# Patient Record
Sex: FEMALE | Race: WHITE | Employment: OTHER | URBAN - METROPOLITAN AREA
[De-identification: names, ages, dates, MRNs, and addresses within clinical notes are randomized per-mention and may not be internally consistent; named-entity substitution may affect disease eponyms.]

---

## 2017-05-27 ENCOUNTER — APPOINTMENT (OUTPATIENT)
Dept: CT IMAGING | Facility: CLINIC | Age: 82
End: 2017-05-27
Attending: EMERGENCY MEDICINE
Payer: MEDICARE

## 2017-05-27 ENCOUNTER — HOSPITAL ENCOUNTER (EMERGENCY)
Facility: CLINIC | Age: 82
Discharge: HOME | End: 2017-05-27
Attending: EMERGENCY MEDICINE | Admitting: EMERGENCY MEDICINE
Payer: MEDICARE

## 2017-05-27 VITALS
TEMPERATURE: 98.4 F | HEIGHT: 63 IN | WEIGHT: 163 LBS | DIASTOLIC BLOOD PRESSURE: 62 MMHG | RESPIRATION RATE: 18 BRPM | SYSTOLIC BLOOD PRESSURE: 142 MMHG | BODY MASS INDEX: 28.88 KG/M2 | OXYGEN SATURATION: 92 %

## 2017-05-27 DIAGNOSIS — R11.11 NON-INTRACTABLE VOMITING WITHOUT NAUSEA, UNSPECIFIED VOMITING TYPE: ICD-10-CM

## 2017-05-27 LAB
ALBUMIN SERPL-MCNC: 3.6 G/DL (ref 3.4–5)
ALBUMIN UR-MCNC: 30 MG/DL
ALP SERPL-CCNC: 100 U/L (ref 40–150)
ALT SERPL W P-5'-P-CCNC: 19 U/L (ref 0–50)
ANION GAP SERPL CALCULATED.3IONS-SCNC: 12 MMOL/L (ref 3–14)
APPEARANCE UR: CLEAR
AST SERPL W P-5'-P-CCNC: 16 U/L (ref 0–45)
BACTERIA #/AREA URNS HPF: ABNORMAL /HPF
BASOPHILS # BLD AUTO: 0 10E9/L (ref 0–0.2)
BASOPHILS NFR BLD AUTO: 0.2 %
BILIRUB SERPL-MCNC: 0.6 MG/DL (ref 0.2–1.3)
BILIRUB UR QL STRIP: NEGATIVE
BUN SERPL-MCNC: 12 MG/DL (ref 7–30)
CALCIUM SERPL-MCNC: 9.2 MG/DL (ref 8.5–10.1)
CHLORIDE SERPL-SCNC: 95 MMOL/L (ref 94–109)
CO2 SERPL-SCNC: 25 MMOL/L (ref 20–32)
COLOR UR AUTO: YELLOW
CREAT SERPL-MCNC: 0.81 MG/DL (ref 0.52–1.04)
DIFFERENTIAL METHOD BLD: NORMAL
EOSINOPHIL # BLD AUTO: 0 10E9/L (ref 0–0.7)
EOSINOPHIL NFR BLD AUTO: 0.2 %
ERYTHROCYTE [DISTWIDTH] IN BLOOD BY AUTOMATED COUNT: 14 % (ref 10–15)
GFR SERPL CREATININE-BSD FRML MDRD: 68 ML/MIN/1.7M2
GLUCOSE SERPL-MCNC: 199 MG/DL (ref 70–99)
GLUCOSE UR STRIP-MCNC: >1000 MG/DL
HCT VFR BLD AUTO: 40.3 % (ref 35–47)
HGB BLD-MCNC: 13.7 G/DL (ref 11.7–15.7)
HGB UR QL STRIP: NEGATIVE
HYALINE CASTS #/AREA URNS LPF: 3 /LPF (ref 0–2)
IMM GRANULOCYTES # BLD: 0 10E9/L (ref 0–0.4)
IMM GRANULOCYTES NFR BLD: 0.5 %
KETONES UR STRIP-MCNC: 10 MG/DL
LEUKOCYTE ESTERASE UR QL STRIP: NEGATIVE
LYMPHOCYTES # BLD AUTO: 0.8 10E9/L (ref 0.8–5.3)
LYMPHOCYTES NFR BLD AUTO: 13.2 %
MCH RBC QN AUTO: 28.7 PG (ref 26.5–33)
MCHC RBC AUTO-ENTMCNC: 34 G/DL (ref 31.5–36.5)
MCV RBC AUTO: 85 FL (ref 78–100)
MONOCYTES # BLD AUTO: 1.1 10E9/L (ref 0–1.3)
MONOCYTES NFR BLD AUTO: 19.3 %
MUCOUS THREADS #/AREA URNS LPF: PRESENT /LPF
NEUTROPHILS # BLD AUTO: 3.8 10E9/L (ref 1.6–8.3)
NEUTROPHILS NFR BLD AUTO: 66.6 %
NITRATE UR QL: NEGATIVE
NRBC # BLD AUTO: 0 10*3/UL
NRBC BLD AUTO-RTO: 0 /100
PH UR STRIP: 7 PH (ref 5–7)
PLATELET # BLD AUTO: 285 10E9/L (ref 150–450)
POTASSIUM SERPL-SCNC: 3 MMOL/L (ref 3.4–5.3)
PROT SERPL-MCNC: 7.2 G/DL (ref 6.8–8.8)
RBC # BLD AUTO: 4.77 10E12/L (ref 3.8–5.2)
RBC #/AREA URNS AUTO: 1 /HPF (ref 0–2)
SODIUM SERPL-SCNC: 132 MMOL/L (ref 133–144)
SP GR UR STRIP: 1 (ref 1–1.03)
SQUAMOUS #/AREA URNS AUTO: 1 /HPF (ref 0–1)
URN SPEC COLLECT METH UR: ABNORMAL
UROBILINOGEN UR STRIP-MCNC: NORMAL MG/DL (ref 0–2)
WBC # BLD AUTO: 5.7 10E9/L (ref 4–11)
WBC #/AREA URNS AUTO: 1 /HPF (ref 0–2)

## 2017-05-27 PROCEDURE — 85025 COMPLETE CBC W/AUTO DIFF WBC: CPT | Performed by: EMERGENCY MEDICINE

## 2017-05-27 PROCEDURE — 25000128 H RX IP 250 OP 636: Performed by: EMERGENCY MEDICINE

## 2017-05-27 PROCEDURE — 96361 HYDRATE IV INFUSION ADD-ON: CPT

## 2017-05-27 PROCEDURE — 87088 URINE BACTERIA CULTURE: CPT | Performed by: EMERGENCY MEDICINE

## 2017-05-27 PROCEDURE — 81001 URINALYSIS AUTO W/SCOPE: CPT | Performed by: EMERGENCY MEDICINE

## 2017-05-27 PROCEDURE — 96365 THER/PROPH/DIAG IV INF INIT: CPT

## 2017-05-27 PROCEDURE — 25000125 ZZHC RX 250: Performed by: EMERGENCY MEDICINE

## 2017-05-27 PROCEDURE — 99285 EMERGENCY DEPT VISIT HI MDM: CPT | Mod: 25

## 2017-05-27 PROCEDURE — 74176 CT ABD & PELVIS W/O CONTRAST: CPT

## 2017-05-27 PROCEDURE — 80053 COMPREHEN METABOLIC PANEL: CPT | Performed by: EMERGENCY MEDICINE

## 2017-05-27 PROCEDURE — 87086 URINE CULTURE/COLONY COUNT: CPT | Performed by: EMERGENCY MEDICINE

## 2017-05-27 RX ORDER — POTASSIUM CL/LIDO/0.9 % NACL 10MEQ/0.1L
10 INTRAVENOUS SOLUTION, PIGGYBACK (ML) INTRAVENOUS
Status: DISCONTINUED | OUTPATIENT
Start: 2017-05-27 | End: 2017-05-27 | Stop reason: HOSPADM

## 2017-05-27 RX ORDER — SODIUM CHLORIDE 9 MG/ML
1000 INJECTION, SOLUTION INTRAVENOUS CONTINUOUS
Status: DISCONTINUED | OUTPATIENT
Start: 2017-05-27 | End: 2017-05-27 | Stop reason: HOSPADM

## 2017-05-27 RX ORDER — ONDANSETRON 4 MG/1
4 TABLET, ORALLY DISINTEGRATING ORAL EVERY 8 HOURS PRN
Qty: 10 TABLET | Refills: 0 | Status: SHIPPED | OUTPATIENT
Start: 2017-05-27 | End: 2017-05-30

## 2017-05-27 RX ADMIN — SODIUM CHLORIDE 1000 ML: 9 INJECTION, SOLUTION INTRAVENOUS at 10:34

## 2017-05-27 RX ADMIN — POTASSIUM CHLORIDE 10 MEQ: 14.9 INJECTION, SOLUTION, CONCENTRATE PARENTERAL at 11:51

## 2017-05-27 RX ADMIN — SODIUM CHLORIDE 1000 ML: 9 INJECTION, SOLUTION INTRAVENOUS at 09:42

## 2017-05-27 ASSESSMENT — ENCOUNTER SYMPTOMS: VOMITING: 1

## 2017-05-27 NOTE — ED AVS SNAPSHOT
Emergency Department    [address] [facility] [address]    Phone:  [phone]    Fax:  [phone]                                       Aisha Fitzgerald   MRN: [id number]    Department:   Emergency Department   Date of Visit:  5/27/2017           After Visit Summary Signature Page     I have received my discharge instructions, and my questions have been answered. I have discussed any challenges I see with this plan with the nurse or doctor.    ..........................................................................................................................................  Patient/Patient Representative Signature      ..........................................................................................................................................  Patient Representative Print Name and Relationship to Patient    ..................................................               ................................................  Date                                            Time    ..........................................................................................................................................  Reviewed by Signature/Title    ...................................................              ..............................................  Date                                                            Time

## 2017-05-27 NOTE — ED PROVIDER NOTES
History     Chief Complaint:  Vomiting    HPI   Aisha Fitzgerald is a 81 year old female with history of ulcer and bowel obstruction who presents with vomiting. The patient had an onset of vomiting yesterday and notes vomiting about 20 times with dark brown emesis. The patient reports difficulty tolerating fluids but notes that she was able to keep down water this morning. The patient desires rehydration. The patient reports having her last bowel movement yesterday morning that was normal.    Allergies:  Hydrochlorothiazide  Contrast dye     Medications:    azithromycin (ZITHROMAX Z-JOVI) 250 MG tablet  Atorvastatin Calcium (LIPITOR PO)  calcium carb 1250 mg, 500 mg [place],/vitamin D 200 units (OSCAL WITH D) 500-200 MG-UNIT per tablet  insulin glargine (LANTUS) 100 UNIT/ML PEN  METOPROLOL SUCCINATE ER PO  METFORMIN HCL PO  fluticasone-salmeterol (ADVAIR) 250-50 MCG/DOSE diskus inhaler  tiotropium (SPIRIVA) 18 MCG inhalation capsule  OMEPRAZOLE PO  Capsicum, Cayenne, (CAYENNE PO)  Cholecalciferol (VITAMIN D3 PO)  CLONIDINE HCL PO  LOSARTAN POTASSIUM PO  CITALOPRAM HYDROBROMIDE PO  Amlodipine Besylate (NORVASC PO)  Levothyroxine Sodium (LEVOTHROID PO)  albuterol (PROVENTIL HFA: VENTOLIN HFA) 108 (90 BASE) MCG/ACT inhaler  COENZYME Q-10 PO  ASPIRIN PO     Past Medical History:    Gastroenteritis  ATN  Respiratory failure  Pneumonia-strep pneumonia  Septic shock  COPD  Bronchiolitis  Diabetes  Hypertension  Ulcer    Past Surgical History:    History reviewed. No pertinent past surgical history.    Family History:    History reviewed. No pertinent family history.    Social History:  Marital Status: Single  Presents to the ED alone  Tobacco Use: 0.50 packs daily  Alcohol Use: No  PCP: Janey Anderson     Review of Systems   Gastrointestinal: Positive for vomiting.   10 point review of systems performed and is negative except as above and in HPI.    Physical Exam   First Vitals:  BP: 147/69  Heart Rate: 103  Temp: 98.4  F  "(36.9  C)  Resp: 18  Height: 160 cm (5' 3\")  Weight: 73.9 kg (163 lb)  SpO2: 94 %    Physical Exam  General: Resting on the gurney, appears comfortable  Head:  The scalp, face, and head appear normal  Mouth/Throat: Mucus membranes are slightly dry  CV:  Regular rate    Normal S1 and S2  No pathological murmur   Resp:  Breath sounds clear and equal bilaterally    Non-labored, no retractions or accessory muscle use    No coarseness    No wheezing   GI:  Abdomen is soft, no guarding, no rebound, no rigidity    No tenderness to palpation  MS:  Normal motor assessment of all extremities.    Good capillary refill noted.  Neuro:   Speech is normal and fluent. No apparent deficit.  Psych: Awake. Alert.  Normal affect.      Appropriate interactions.    Emergency Department Course   Imaging:  Radiographic findings were communicated with the patient who voiced understanding of the findings.    CT Abdomen Pelvis w/o Contrast   Final Result   Impression:    1. Cholelithiasis without signs of cholecystitis or biliary   dilatation.   2. Sequela of prior granulomatous disease in the liver and spleen.   3. Extensive atherosclerotic calcifications in the abdominal aorta and   its branch vessels without aneurysmal dilatation.      MANOLO CURRY        All Readings Per Radiology Unless Otherwise Noted.    Laboratory:  CBC: WBC 5.7, HGB 13.7, , WNL  CMP:  (L), K 3.0 (L), Glc 199 (H), Rest WNL (Creatinine 0.81)  UA: Clear yellow urine, Glc >1,000, Ketones 10, Albumin 30, Bacteria Few, Mucous Present, Hyaline Casts 3 (H), otherwise WNL  Urine Culture: pending    Interventions:  (0942) Normal Saline, 1 liter, IV bolus  (1151) K Cl 10 mEq with 10 mg lidocaine, IV infusion    ED Course:  Nursing notes and past medical history reviewed.   I performed a physical examination of the patient as documented above.  I explained the plan with the patient who consents to this.   The patient was sent for a CT abdomen pelvis while in the " emergency department, findings above.  Blood was drawn from the patient. This was sent for laboratory testing, findings above.  Urine sample was obtained and sent for laboratory analysis, findings above.  1359: Rechecked the patient.  Findings and plan explained to the patient. Patient discharged home with instructions regarding supportive care, medications, and reasons to return. The importance of close follow-up was reviewed.    Impression & Plan    Medical Decision Making:  Aisha Fitzgerald is a 81 year old female who presents to the emergency department for the evaluation of vomiting.  Lab evaluation shows without significant abnormality except for hyperglycemia and glucose in her urine .  The patient was given fluids and zofran with improvement in symptoms.  The abdominal exam is benign and hepatobiliary disease, obstruction, ischemia, of surgical etiology is highly unlikely.      Lab evaluation shoes no evidence of profound dehydration.  Potassium replaced.    Considerable symptomatic improvement and the patient is comfortable with close out patient follow up and strict return precautions.  Prescription for zofran given.        Diagnosis:    ICD-10-CM    1. Non-intractable vomiting without nausea, unspecified vomiting type R11.11        Disposition:   Discharge to home.     Discharge Medications:   Discharge Medication List as of 5/27/2017  1:56 PM      START taking these medications    Details   ondansetron (ZOFRAN ODT) 4 MG ODT tab Take 1 tablet (4 mg) by mouth every 8 hours as needed for nausea, Disp-10 tablet, R-0, Local Print               Marcio COBURN, am serving as a scribe on 5/27/2017 at 12:20 PM to personally document services performed by Teena Hatfield MD, based on my observations and the provider's statements to me.       Teena Hatfield MD  05/28/17 2007

## 2017-05-27 NOTE — ED AVS SNAPSHOT
Emergency Department    [address] [facility] [address]    Phone:  [phone]    Fax:  [phone]                                       Aisha Fitzgerald   MRN: [id number]    Department:   Emergency Department   Date of Visit:  5/27/2017           Patient Information     Date Of Birth          1935        Your diagnoses for this visit were:     Non-intractable vomiting without nausea, unspecified vomiting type        You were seen by Teena Hatfield MD.      Follow-up Information     Follow up with Janey Anderson Schedule an appointment as soon as possible for a visit in 2 days.    Specialty:  Physician Assistant    Contact information:    [facility]  [address]  [facility] [address] [phone]          Follow up with  Emergency Department.    Specialty:  EMERGENCY MEDICINE    Why:  As needed, If symptoms worsen    Contact information:    [address] [facility] [address] [phone]        Discharge Instructions         Discharge Instructions  Vomiting    You have been seen today for vomiting. This is usually caused by a virus, but some bacteria, parasites, medicines or other medical conditions can cause similar symptoms. At this time your doctor does not find that your vomiting is a sign of anything dangerous or life-threatening. However, sometimes the signs of serious illness do not show up right away. If you have new or worse symptoms, you may need to be seen again in the emergency department or by your primary doctor. Remember that serious problems like appendicitis can start as vomiting.     Return to the Emergency Department if:    You keep throwing up and you are not able to keep liquids down.     You feel you are getting dehydrated, such as being very thirsty, not urinating at least every 8-12 hours, or feeling faint or lightheaded.     You develop a new fever, or your fever continues for more than 2 days.     You have  belly pain that seems worse than cramps, is in one spot, or is getting worse over time.     You have blood in your vomit or stools.     You feel very weak.    You are not starting to improve within 24 hours of your visit here.     What can I do to help myself?    The most important thing to do is to drink clear liquids. If you have been vomiting a lot, it is best to have only small, frequent sips of liquids. Drinking too much at once may cause more vomiting. If you are vomiting often, you must replace minerals, sodium and potassium lost with your illness. Pedialyte  and sports drinks can help you replace these minerals. You can also drink clear liquids such as water, weak tea, apple juice, and 7-Up . Avoid acid liquids (orange), caffeine (coffee) or alcohol. Do not drink milk until you no longer have diarrhea.     After liquids are staying down, you may start eating mild foods. Soda crackers, toast, plain noodles, gelatin, applesauce and bananas are good first choices. Avoid foods that have acid, are spicy, fatty or have a lot of fiber (such as meats, coarse grains, vegetables). You may start eating these foods again in about 3 days when you are better.     Sometimes treatment includes prescription medicine to prevent nausea and vomiting. If your doctor prescribes these for you, take them as directed.     Don t take ibuprofen, or other nonsteroidal anti-inflammatory medicines without checking with your healthcare provider.   If you were given a prescription for medicine here today, be sure to read all of the information (including the package insert) that comes with your prescription.  This will include important information about the medicine, its side effects, and any warnings that you need to know about.  The pharmacist who fills the prescription can provide more information and answer questions you may have about the medicine.  If you have questions or concerns that the pharmacist cannot address, please call or  return to the Emergency Department.       Opioid Medication Information    Pain medications are among the most commonly prescribed medicines, so we are including this information for all our patients. If you did not receive pain medication or get a prescription for pain medicine, you can ignore it.     You may have been given a prescription for an opioid (narcotic) pain medicine and/or have received a pain medicine while here in the Emergency Department. These medicines can make you drowsy or impaired. You must not drive, operate dangerous equipment, or engage in any other dangerous activities while taking these medications. If you drive while taking these medications, you could be arrested for DUI, or driving under the influence. Do not drink any alcohol while you are taking these medications.     Opioid pain medications can cause addiction. If you have a history of chemical dependency of any type, you are at a higher risk of becoming addicted to pain medications.  Only take these prescribed medications to treat your pain when all other options have been tried. Take it for as short a time and as few doses as possible. Store your pain pills in a secure place, as they are frequently stolen and provide a dangerous opportunity for children or visitors in your house to start abusing these powerful medications. We will not replace any lost or stolen medicine.  As soon as your pain is better, you should flush all your remaining medication.     Many prescription pain medications contain Tylenol  (acetaminophen), including Vicodin , Tylenol #3 , Norco , Lortab , and Percocet .  You should not take any extra pills of Tylenol  if you are using these prescription medications or you can get very sick.  Do not ever take more than 3000 mg of acetaminophen in any 24 hour period.    All opioids tend to cause constipation. Drink plenty of water and eat foods that have a lot of fiber, such as fruits, vegetables, prune juice, apple  juice and high fiber cereal.  Take a laxative if you don t move your bowels at least every other day. Miralax , Milk of Magnesia, Colace , or Senna  can be used to keep you regular.      Remember that you can always come back to the Emergency Department if you are not able to see your regular doctor in the amount of time listed above, if you get any new symptoms, or if there is anything that worries you.          24 Hour Appointment Hotline       To make an appointment at any [facility], call [phone] ([phone]). If you don't have a family doctor or clinic, we will help you find one. [place] clinics are conveniently located to serve the needs of you and your family.             Review of your medicines      START taking        Dose / Directions Last dose taken    ondansetron 4 MG ODT tab   Commonly known as:  ZOFRAN ODT   Dose:  4 mg   Quantity:  10 tablet        Take 1 tablet (4 mg) by mouth every 8 hours as needed for nausea   Refills:  0          Our records show that you are taking the medicines listed below. If these are incorrect, please call your family doctor or clinic.        Dose / Directions Last dose taken    albuterol 108 (90 BASE) MCG/ACT Inhaler   Commonly known as:  PROAIR HFA/PROVENTIL HFA/VENTOLIN HFA   Dose:  2-4 puff        Inhale 2-4 puffs into the lungs every 4 hours as needed   Refills:  0        ASPIRIN PO   Dose:  81 mg        Take 81 mg by mouth daily.   Refills:  0        azithromycin 250 MG tablet   Commonly known as:  ZITHROMAX Z-JOVI   Quantity:  6 tablet        Take as directed   Refills:  0        calcium carb 1250 mg (500 mg [place])/vitamin D 200 units 500-200 MG-UNIT per tablet   Commonly known as:  OSCAL with D   Dose:  0.5 tablet        Take 0.5 tablets by mouth daily   Refills:  0        CAYENNE PO   Dose:  1 tablet        Take 1 tablet by mouth daily Unknown dose   Refills:  0        CITALOPRAM HYDROBROMIDE PO   Dose:  20 mg        Take 20 mg by mouth daily.    Refills:  0        CLONIDINE HCL PO   Dose:  0.1 mg        Take 0.1 mg by mouth 2 times daily.   Refills:  0        COENZYME Q-10 PO   Dose:  100 mg        Take 100 mg by mouth daily.   Refills:  0        fluticasone-salmeterol 250-50 MCG/DOSE diskus inhaler   Commonly known as:  ADVAIR   Dose:  1 puff        Inhale 1 puff into the lungs 2 times daily   Refills:  0        insulin glargine 100 UNIT/ML injection   Commonly known as:  LANTUS   Dose:  8 Units        Inject 8 Units Subcutaneous At Bedtime   Refills:  0        LEVOTHROID PO   Dose:  112 mcg        Take 112 mcg by mouth daily   Refills:  0        LIPITOR PO   Dose:  20 mg        Take 20 mg by mouth every evening   Refills:  0        LOSARTAN POTASSIUM PO   Dose:  25 mg        Take 25 mg by mouth 2 times daily   Refills:  0        METFORMIN HCL PO   Dose:  500 mg        Take 500 mg by mouth daily (with breakfast)   Refills:  0        METOPROLOL SUCCINATE ER PO   Dose:  25 mg        Take 25 mg by mouth every evening   Refills:  0        NORVASC PO   Dose:  5 mg        Take 5 mg by mouth daily   Refills:  0        OMEPRAZOLE PO   Dose:  40 mg        Take 40 mg by mouth every morning   Refills:  0        tiotropium 18 MCG capsule   Commonly known as:  SPIRIVA   Dose:  18 mcg        Inhale 18 mcg into the lungs daily   Refills:  0        VITAMIN D3 PO   Dose:  1000 Units        Take 1,000 Units by mouth daily.   Refills:  0                Prescriptions were sent or printed at these locations (1 Prescription)                   Other Prescriptions                Printed at Department/Unit printer (1 of 1)         ondansetron (ZOFRAN ODT) 4 MG ODT tab                Procedures and tests performed during your visit     CBC with platelets differential    CT Abdomen Pelvis w/o Contrast    Comprehensive metabolic panel    UA with Microscopic    Urine Culture      Orders Needing Specimen Collection     None      Pending Results     Date and Time Order Name Status  Description    5/27/2017 0941 Urine Culture In process             Pending Culture Results     Date and Time Order Name Status Description    5/27/2017 0941 Urine Culture In process             Pending Results Instructions     If you had any lab results that were not finalized at the time of your Discharge, you can call the ED Lab Result RN at [phone]. You will be contacted by this team for any positive Lab results or changes in treatment. The nurses are available 7 days a week from 10A to 6:30P.  You can leave a message 24 hours per day and they will return your call.        Test Results From Your Hospital Stay        5/27/2017 10:00 AM      Component Results     Component Value Ref Range & Units Status    WBC 5.7 4.0 - 11.0 10e9/L Final    RBC Count 4.77 3.8 - 5.2 10e12/L Final    Hemoglobin 13.7 11.7 - 15.7 g/dL Final    Hematocrit 40.3 35.0 - 47.0 % Final    MCV 85 78 - 100 fl Final    MCH 28.7 26.5 - 33.0 pg Final    MCHC 34.0 31.5 - 36.5 g/dL Final    RDW 14.0 10.0 - 15.0 % Final    Platelet Count 285 150 - 450 10e9/L Final    Diff Method Automated Method  Final    % Neutrophils 66.6 % Final    % Lymphocytes 13.2 % Final    % Monocytes 19.3 % Final    % Eosinophils 0.2 % Final    % Basophils 0.2 % Final    % Immature Granulocytes 0.5 % Final    Nucleated RBCs 0 0 /100 Final    Absolute Neutrophil 3.8 1.6 - 8.3 10e9/L Final    Absolute Lymphocytes 0.8 0.8 - 5.3 10e9/L Final    Absolute Monocytes 1.1 0.0 - 1.3 10e9/L Final    Absolute Eosinophils 0.0 0.0 - 0.7 10e9/L Final    Absolute Basophils 0.0 0.0 - 0.2 10e9/L Final    Abs Immature Granulocytes 0.0 0 - 0.4 10e9/L Final    Absolute Nucleated RBC 0.0  Final         5/27/2017 10:20 AM      Component Results     Component Value Ref Range & Units Status    Sodium 132 (L) 133 - 144 mmol/L Final    Potassium 3.0 (L) 3.4 - 5.3 mmol/L Final    Chloride 95 94 - 109 mmol/L Final    Carbon Dioxide 25 20 - 32 mmol/L Final    Anion Gap 12 3 - 14 mmol/L Final     Glucose 199 (H) 70 - 99 mg/dL Final    Urea Nitrogen 12 7 - 30 mg/dL Final    Creatinine 0.81 0.52 - 1.04 mg/dL Final    GFR Estimate 68 >60 mL/min/1.7m2 Final    Non  GFR Calc    GFR Estimate If Black 82 >60 mL/min/1.7m2 Final    African American GFR Calc    Calcium 9.2 8.5 - 10.1 mg/dL Final    Bilirubin Total 0.6 0.2 - 1.3 mg/dL Final    Albumin 3.6 3.4 - 5.0 g/dL Final    Protein Total 7.2 6.8 - 8.8 g/dL Final    Alkaline Phosphatase 100 40 - 150 U/L Final    ALT 19 0 - 50 U/L Final    AST 16 0 - 45 U/L Final         5/27/2017 11:14 AM      Component Results     Component Value Ref Range & Units Status    Color Urine Yellow  Final    Appearance Urine Clear  Final    Glucose Urine >1000 (A) NEG mg/dL Final    Bilirubin Urine Negative NEG Final    Ketones Urine 10 (A) NEG mg/dL Final    Specific Gravity Urine 1.005 1.003 - 1.035 Final    Blood Urine Negative NEG Final    pH Urine 7.0 5.0 - 7.0 pH Final    Protein Albumin Urine 30 (A) NEG mg/dL Final    Urobilinogen mg/dL Normal 0.0 - 2.0 mg/dL Final    Nitrite Urine Negative NEG Final    Leukocyte Esterase Urine Negative NEG Final    Source Midstream Urine  Final    WBC Urine 1 0 - 2 /HPF Final    RBC Urine 1 0 - 2 /HPF Final    Bacteria Urine Few (A) NEG /HPF Final    Squamous Epithelial /HPF Urine 1 0 - 1 /HPF Final    Mucous Urine Present (A) NEG /LPF Final    Hyaline Casts 3 (H) 0 - 2 /LPF Final         5/27/2017 11:06 AM         5/27/2017 11:44 AM      Narrative     Exam: CT ABDOMEN PELVIS W/O CONTRAST  5/27/2017 11:37 AM    History: Vomiting.    Comparison: 7/1/2015    Technique: Volumetric acquisition with reconstruction in the axial,  coronal planes through the abdomen and pelvis without contrast.  Radiation dose for this scan was reduced using automated exposure  control, adjustment of the mA and/or kV according to patient size, or  iterative reconstruction technique.    Contrast: None    Findings:   Lung Bases: Scarring/atelectasis at the  right base is again seen. Lung  bases are otherwise clear. Small hiatal hernia.    Abdomen: Multiple calcified granulomas in the liver and spleen.  Unenhanced liver and spleen otherwise normal. Cholelithiasis without  signs of cholecystitis or biliary dilatation. Normal appearance of the  unenhanced adrenal glands and kidneys. Pancreas is atrophic.    No areas of bowel wall thickening or bowel dilatation. Normal  appendix. No free fluid. No abdominal or pelvic lymphadenopathy.  Atherosclerotic calcifications of the abdominal aorta and its branch  vessels without aneurysmal dilatation.    Bones: No concerning lytic or sclerotic lesions.        Impression     Impression:   1. Cholelithiasis without signs of cholecystitis or biliary  dilatation.  2. Sequela of prior granulomatous disease in the liver and spleen.  3. Extensive atherosclerotic calcifications in the abdominal aorta and  its branch vessels without aneurysmal dilatation.    MANOLO CURRY                Clinical Quality Measure: Blood Pressure Screening     Your blood pressure was checked while you were in the emergency department today. The last reading we obtained was  BP: 145/68 . Please read the guidelines below about what these numbers mean and what you should do about them.  If your systolic blood pressure (the top number) is less than 120 and your diastolic blood pressure (the bottom number) is less than 80, then your blood pressure is normal. There is nothing more that you need to do about it.  If your systolic blood pressure (the top number) is 120-139 or your diastolic blood pressure (the bottom number) is 80-89, your blood pressure may be higher than it should be. You should have your blood pressure rechecked within a year by a primary care provider.  If your systolic blood pressure (the top number) is 140 or greater or your diastolic blood pressure (the bottom number) is 90 or greater, you may have high blood pressure. High blood pressure is  "treatable, but if left untreated over time it can put you at risk for heart attack, stroke, or kidney failure. You should have your blood pressure rechecked by a primary care provider within the next 4 weeks.  If your provider in the emergency department today gave you specific instructions to follow-up with your doctor or provider even sooner than that, you should follow that instruction and not wait for up to 4 weeks for your follow-up visit.        Thank you for choosing [facility]       Thank you for choosing [facility] for your care. Our goal is always to provide you with excellent care. Hearing back from our patients is one way we can continue to improve our services. Please take a few minutes to complete the written survey that you may receive in the mail after you visit with us. Thank you!        [facility] Information     [facility] lets you send messages to your doctor, view your test results, renew your prescriptions, schedule appointments and more. To sign up, go to [email or web address] . Click on \"Log in\" on the left side of the screen, which will take you to the Welcome page. Then click on \"Sign up Now\" on the right side of the page.     You will be asked to enter the access code listed below, as well as some personal information. Please follow the directions to create your username and password.     Your access code is: [email or web address]  Expires: 2017  1:56 PM     Your access code will  in 90 days. If you need help or a new code, please call your [facility] clinic or [phone].        Care EveryWhere ID     This is your Care EveryWhere ID. This could be used by other organizations to access your [facility] medical records  [id number]        After Visit Summary       This is your record. Keep this with you and show to your community pharmacist(s) and doctor(s) at your next visit.                  "

## 2017-05-28 ENCOUNTER — TELEPHONE (OUTPATIENT)
Dept: EMERGENCY MEDICINE | Facility: CLINIC | Age: 82
End: 2017-05-28

## 2017-05-28 DIAGNOSIS — N39.0 URINARY TRACT INFECTION: ICD-10-CM

## 2017-05-28 DIAGNOSIS — N39.0 UTI (URINARY TRACT INFECTION): ICD-10-CM

## 2017-05-28 NOTE — TELEPHONE ENCOUNTER
[facility] Emergency Department Lab result notification [Adult-Female]    [facility] ED lab result protocol used  Urine Culture    Reason for call  Notify of lab results, assess symptoms,  review ED providers recommendations/discharge instructions (if necessary) and advise per ED lab result f/u protocol    Lab Result (including Rx patient on, if applicable)  Preliminary urine culture report on 5/28/17 shows the presence of bacteria(s):  >100,000 colonies/mL Beta hemolytic Streptococcus group B   [place] ED discharge antibiotic: None.  Recommendations per [place] ED Lab result protocol - Urine culture protocol.    Information table from ED Provider visit on 5/27/17   ED diagnosis Non-intractable vomiting without nausea   ED provider Teena Hatfield MD   Symptoms reported at ED visit (Chief complaint, HPI) Aisha Fitzgerald is a 81 year old female with history of ulcer and bowel obstruction who presents with vomiting. The patient had an onset of vomiting yesterday and notes vomiting about 20 times with dark brown emesis. The patient reports difficulty tolerating fluids but notes that she was able to keep down water this morning. The patient desires rehydration. The patient reports having her last bowel movement yesterday morning that was normal.      ED providers Impression and Plan (applicable information) Aisha Fitzgerald is a 81 year old female who presents to the emergency department for the evaluation of vomiting.  Lab evaluation shows without significant abnormality except for    .  The patient was given fluids and     with      improvement in symptoms.  The abdominal exam is benign and hepatobiliary disease, obstruction, ischemia, of surgical etiology is highly unlikely.             Lab evaluation shoes no evidence of profound dehydration or electrolyte abnormality.     Considerable symptomatic improvement and the patient is comfortable with close out patient follow up and strict return  "precautions.  Prescription for      given.  OR  Unfortunately, the patient's symptoms persisted despite symptomatic treatment attempts in the ED.  They are uncomfortable with discharge to home and are unable to tolerate PO.  Will admit to     observation for symptomatic control.   Significant Medical hx, if applicable Gastroenteritis, HTN, DM, COPD, PNA, resp failure   Coumadin/Warfarin [Yes /No] No   Creatinine Level (mg/dl) 0.81   Creatinine clearance (ml/min), if applicable 64.2   Pregnant (Yes/No/NA) No   Breastfeeding (Yes/No/NA) No   Allergies HCTZ, contrast dye   Weight, if applicable 163#      RN Assessment (Patient s current Symptoms), include time called.  [Insert Left message here if message left]  Aisha reports \"I feel fine\", able to sleep \"much better\", \"I slep all night\".  Denies further nausea/vomiting and denies urinary symptoms.   No new symptoms since discharge.       Please Contact your PCP clinic or return to the Emergency department if your:    Symptoms return.    Symptoms worsen or other concerning symptom's.    PCP follow-up Questions asked: NO    Elisa Cadena RN    [facility] RN  Lung Nodule and ED Lab Results F/U RN  Epic pool (ED late result f/u RN) : [id number]   # [phone]  Copy of Lab result    Order   Urine Culture [[id number]] (Order [id number])   Preliminary Result   Exam Information   Exam Date Exam Time Accession # Results    5/27/17 10:56 AM [id number]    Component Results   Component Collected Lab   Specimen Description 05/27/2017 10:56 AM [id number]   Midstream Urine   Special Requests 05/27/2017 10:56 AM 75   Specimen received in preservative   Culture Micro (Abnormal) 05/27/2017 10:56    >100,000 colonies/mL Beta hemolytic Streptococcus group B Beta Hemolytic    Streptococcus groups A and B are susceptible to ampicillin, penicillin,    vancomycin, and the cephalosporins.  Susceptibility testing is not routinely    done on these organisms isolated from " urine.      Micro Report Status 05/27/2017 10:56    Pending

## 2017-05-29 LAB
BACTERIA SPEC CULT: ABNORMAL
Lab: ABNORMAL
MICRO REPORT STATUS: ABNORMAL
SPECIMEN SOURCE: ABNORMAL

## 2017-05-29 NOTE — TELEPHONE ENCOUNTER
[facility] Emergency Department Lab result notification:    [facility] lab result protocol used  Urine Culture Protcol    Reason for call  Notify of lab results, assess symptoms,  review ED providers recommendations/discharge instructions (if necessary) and advise per ED lab result f/u protocol    Lab Result  Final urine culture on 05/29/2017 shows the presence of bacteria(s): >100,000 colonies/mL Beta hemolytic Streptococcus group B   [facility] ED discharge antibiotic: None  As per  ED lab result protocol, treat per Urine culture protocol.    RN Assessment (Patient s current Symptoms), include time called.  [Insert Left message here if message left]  At 1520 no message left got busy signal will attempt at a later time.     China Russell, RN  [facility] RN  Lung Nodule and ED Lab Result F/u RN  Epic pool (ED late result f/u RN): P [id number]   INCIDENTAL RADIOLOGY F/U NURSES: P [id number]  # [phone]      Copy of Lab result   Order   Urine Culture [[id number]] (Order [id number])   Exam Information   Exam Date Exam Time Accession # Results    5/27/17 10:56 AM [id number]    Component Results   Component Collected Lab   Specimen Description 05/27/2017 10:56 AM [id number]   Midstream Urine   Special Requests 05/27/2017 10:56 AM 75   Specimen received in preservative   Culture Micro (Abnormal) 05/27/2017 10:56    >100,000 colonies/mL Beta hemolytic Streptococcus group B Beta Hemolytic    Streptococcus groups A and B are susceptible to ampicillin, penicillin,    vancomycin, and the cephalosporins.  Susceptibility testing is not routinely    done on these organisms isolated from urine.      Micro Report Status 05/27/2017 10:56    FINAL 05/29/2017

## 2017-05-30 NOTE — TELEPHONE ENCOUNTER
Aisha notified of urine culture result.  She denies any specific uti sx's but is very fatigued.  Will treat with Augmentin 875 bid for 3 days.  She was encouraged to f/u her PCP if sx's are not resolving.    Epi Arreaga, RN  [facility] RN  Lung Nodule and ED Lab Result F/u RN  Epic pool (ED late result f/u RN): P [id number]  [facility] INCIDENTAL RADIOLOGY F/U NURSES: P [id number]  # [phone]

## 2017-11-02 ENCOUNTER — APPOINTMENT (OUTPATIENT)
Dept: GENERAL RADIOLOGY | Facility: CLINIC | Age: 82
End: 2017-11-02
Attending: EMERGENCY MEDICINE
Payer: MEDICARE

## 2017-11-02 ENCOUNTER — HOSPITAL ENCOUNTER (EMERGENCY)
Facility: CLINIC | Age: 82
Discharge: HOME | End: 2017-11-02
Attending: EMERGENCY MEDICINE | Admitting: EMERGENCY MEDICINE
Payer: MEDICARE

## 2017-11-02 VITALS
RESPIRATION RATE: 18 BRPM | HEIGHT: 63 IN | OXYGEN SATURATION: 94 % | BODY MASS INDEX: 27.11 KG/M2 | TEMPERATURE: 98 F | WEIGHT: 153 LBS | SYSTOLIC BLOOD PRESSURE: 151 MMHG | DIASTOLIC BLOOD PRESSURE: 82 MMHG

## 2017-11-02 DIAGNOSIS — R11.2 NON-INTRACTABLE VOMITING WITH NAUSEA, UNSPECIFIED VOMITING TYPE: ICD-10-CM

## 2017-11-02 DIAGNOSIS — E87.6 HYPOKALEMIA: ICD-10-CM

## 2017-11-02 LAB
ALBUMIN SERPL-MCNC: 4.2 G/DL (ref 3.4–5)
ALBUMIN UR-MCNC: 30 MG/DL
ALP SERPL-CCNC: 117 U/L (ref 40–150)
ALT SERPL W P-5'-P-CCNC: 21 U/L (ref 0–50)
ANION GAP SERPL CALCULATED.3IONS-SCNC: 13 MMOL/L (ref 3–14)
APPEARANCE UR: CLEAR
AST SERPL W P-5'-P-CCNC: 16 U/L (ref 0–45)
BASOPHILS # BLD AUTO: 0 10E9/L (ref 0–0.2)
BASOPHILS NFR BLD AUTO: 0.1 %
BILIRUB DIRECT SERPL-MCNC: 0.1 MG/DL (ref 0–0.2)
BILIRUB SERPL-MCNC: 0.7 MG/DL (ref 0.2–1.3)
BILIRUB UR QL STRIP: NEGATIVE
BUN SERPL-MCNC: 17 MG/DL (ref 7–30)
CALCIUM SERPL-MCNC: 10.4 MG/DL (ref 8.5–10.1)
CHLORIDE SERPL-SCNC: 94 MMOL/L (ref 94–109)
CO2 SERPL-SCNC: 25 MMOL/L (ref 20–32)
COLOR UR AUTO: ABNORMAL
CREAT SERPL-MCNC: 0.8 MG/DL (ref 0.52–1.04)
DIFFERENTIAL METHOD BLD: ABNORMAL
EOSINOPHIL # BLD AUTO: 0 10E9/L (ref 0–0.7)
EOSINOPHIL NFR BLD AUTO: 0 %
ERYTHROCYTE [DISTWIDTH] IN BLOOD BY AUTOMATED COUNT: 14.2 % (ref 10–15)
GFR SERPL CREATININE-BSD FRML MDRD: 69 ML/MIN/1.7M2
GLUCOSE SERPL-MCNC: 206 MG/DL (ref 70–99)
GLUCOSE UR STRIP-MCNC: 300 MG/DL
HCT VFR BLD AUTO: 41.9 % (ref 35–47)
HGB BLD-MCNC: 14.2 G/DL (ref 11.7–15.7)
HGB UR QL STRIP: ABNORMAL
HYALINE CASTS #/AREA URNS LPF: 1 /LPF (ref 0–2)
IMM GRANULOCYTES # BLD: 0.1 10E9/L (ref 0–0.4)
IMM GRANULOCYTES NFR BLD: 0.5 %
INTERPRETATION ECG - MUSE: NORMAL
KETONES UR STRIP-MCNC: 10 MG/DL
LACTATE BLD-SCNC: 2.4 MMOL/L (ref 0.7–2)
LACTATE SERPL-SCNC: 0.9 MMOL/L (ref 0.4–2)
LEUKOCYTE ESTERASE UR QL STRIP: NEGATIVE
LIPASE SERPL-CCNC: 106 U/L (ref 73–393)
LYMPHOCYTES # BLD AUTO: 0.8 10E9/L (ref 0.8–5.3)
LYMPHOCYTES NFR BLD AUTO: 5.9 %
MCH RBC QN AUTO: 26.5 PG (ref 26.5–33)
MCHC RBC AUTO-ENTMCNC: 33.9 G/DL (ref 31.5–36.5)
MCV RBC AUTO: 78 FL (ref 78–100)
MONOCYTES # BLD AUTO: 1.6 10E9/L (ref 0–1.3)
MONOCYTES NFR BLD AUTO: 12.3 %
MUCOUS THREADS #/AREA URNS LPF: PRESENT /LPF
NEUTROPHILS # BLD AUTO: 10.6 10E9/L (ref 1.6–8.3)
NEUTROPHILS NFR BLD AUTO: 81.2 %
NITRATE UR QL: NEGATIVE
NRBC # BLD AUTO: 0 10*3/UL
NRBC BLD AUTO-RTO: 0 /100
PH UR STRIP: 7 PH (ref 5–7)
PLATELET # BLD AUTO: 377 10E9/L (ref 150–450)
POTASSIUM SERPL-SCNC: 2.8 MMOL/L (ref 3.4–5.3)
POTASSIUM SERPL-SCNC: 3.3 MMOL/L (ref 3.4–5.3)
PROT SERPL-MCNC: 8.5 G/DL (ref 6.8–8.8)
RBC # BLD AUTO: 5.36 10E12/L (ref 3.8–5.2)
RBC #/AREA URNS AUTO: 2 /HPF (ref 0–2)
SODIUM SERPL-SCNC: 132 MMOL/L (ref 133–144)
SOURCE: ABNORMAL
SP GR UR STRIP: 1.01 (ref 1–1.03)
SQUAMOUS #/AREA URNS AUTO: <1 /HPF (ref 0–1)
UROBILINOGEN UR STRIP-MCNC: NORMAL MG/DL (ref 0–2)
WBC # BLD AUTO: 13 10E9/L (ref 4–11)
WBC #/AREA URNS AUTO: 2 /HPF (ref 0–2)

## 2017-11-02 PROCEDURE — 93005 ELECTROCARDIOGRAM TRACING: CPT

## 2017-11-02 PROCEDURE — 81001 URINALYSIS AUTO W/SCOPE: CPT | Performed by: EMERGENCY MEDICINE

## 2017-11-02 PROCEDURE — 83690 ASSAY OF LIPASE: CPT | Performed by: EMERGENCY MEDICINE

## 2017-11-02 PROCEDURE — 96376 TX/PRO/DX INJ SAME DRUG ADON: CPT

## 2017-11-02 PROCEDURE — 96365 THER/PROPH/DIAG IV INF INIT: CPT

## 2017-11-02 PROCEDURE — 96367 TX/PROPH/DG ADDL SEQ IV INF: CPT

## 2017-11-02 PROCEDURE — 85025 COMPLETE CBC W/AUTO DIFF WBC: CPT | Performed by: EMERGENCY MEDICINE

## 2017-11-02 PROCEDURE — A9270 NON-COVERED ITEM OR SERVICE: HCPCS | Mod: GY | Performed by: EMERGENCY MEDICINE

## 2017-11-02 PROCEDURE — 25000128 H RX IP 250 OP 636: Performed by: EMERGENCY MEDICINE

## 2017-11-02 PROCEDURE — 84132 ASSAY OF SERUM POTASSIUM: CPT | Mod: 91 | Performed by: EMERGENCY MEDICINE

## 2017-11-02 PROCEDURE — 74020 XR ABDOMEN 2 VW: CPT

## 2017-11-02 PROCEDURE — 80048 BASIC METABOLIC PNL TOTAL CA: CPT | Performed by: EMERGENCY MEDICINE

## 2017-11-02 PROCEDURE — 25000132 ZZH RX MED GY IP 250 OP 250 PS 637: Mod: GY | Performed by: EMERGENCY MEDICINE

## 2017-11-02 PROCEDURE — 36415 COLL VENOUS BLD VENIPUNCTURE: CPT | Performed by: EMERGENCY MEDICINE

## 2017-11-02 PROCEDURE — 80076 HEPATIC FUNCTION PANEL: CPT | Performed by: EMERGENCY MEDICINE

## 2017-11-02 PROCEDURE — 83605 ASSAY OF LACTIC ACID: CPT | Mod: 91 | Performed by: EMERGENCY MEDICINE

## 2017-11-02 PROCEDURE — 96375 TX/PRO/DX INJ NEW DRUG ADDON: CPT

## 2017-11-02 PROCEDURE — 99285 EMERGENCY DEPT VISIT HI MDM: CPT | Mod: 25

## 2017-11-02 PROCEDURE — 96361 HYDRATE IV INFUSION ADD-ON: CPT

## 2017-11-02 RX ORDER — POTASSIUM CL/LIDO/0.9 % NACL 10MEQ/0.1L
10 INTRAVENOUS SOLUTION, PIGGYBACK (ML) INTRAVENOUS
Status: DISCONTINUED | OUTPATIENT
Start: 2017-11-02 | End: 2017-11-02 | Stop reason: HOSPADM

## 2017-11-02 RX ORDER — POTASSIUM CHLORIDE 1500 MG/1
60 TABLET, EXTENDED RELEASE ORAL ONCE
Status: COMPLETED | OUTPATIENT
Start: 2017-11-02 | End: 2017-11-02

## 2017-11-02 RX ORDER — SUCRALFATE 1 G/1
TABLET ORAL 4 TIMES DAILY
Status: ON HOLD | COMMUNITY
End: 2019-01-26

## 2017-11-02 RX ORDER — ONDANSETRON 2 MG/ML
4 INJECTION INTRAMUSCULAR; INTRAVENOUS ONCE
Status: COMPLETED | OUTPATIENT
Start: 2017-11-02 | End: 2017-11-02

## 2017-11-02 RX ORDER — ACETAMINOPHEN 500 MG
1000 TABLET ORAL ONCE
Status: COMPLETED | OUTPATIENT
Start: 2017-11-02 | End: 2017-11-02

## 2017-11-02 RX ORDER — ONDANSETRON 4 MG/1
4 TABLET, ORALLY DISINTEGRATING ORAL EVERY 8 HOURS PRN
Qty: 10 TABLET | Refills: 0 | Status: SHIPPED | OUTPATIENT
Start: 2017-11-02 | End: 2017-11-05

## 2017-11-02 RX ADMIN — Medication 10 MEQ: at 11:42

## 2017-11-02 RX ADMIN — ACETAMINOPHEN 1000 MG: 500 TABLET, FILM COATED ORAL at 14:15

## 2017-11-02 RX ADMIN — ONDANSETRON 4 MG: 2 SOLUTION INTRAMUSCULAR; INTRAVENOUS at 13:58

## 2017-11-02 RX ADMIN — ONDANSETRON 4 MG: 2 SOLUTION INTRAMUSCULAR; INTRAVENOUS at 09:21

## 2017-11-02 RX ADMIN — SODIUM CHLORIDE 1000 ML: 9 INJECTION, SOLUTION INTRAVENOUS at 09:17

## 2017-11-02 RX ADMIN — SODIUM CHLORIDE 1000 ML: 9 INJECTION, SOLUTION INTRAVENOUS at 10:18

## 2017-11-02 RX ADMIN — Medication 10 MEQ: at 10:12

## 2017-11-02 RX ADMIN — POTASSIUM CHLORIDE 60 MEQ: 1500 TABLET, EXTENDED RELEASE ORAL at 11:40

## 2017-11-02 ASSESSMENT — ENCOUNTER SYMPTOMS
SHORTNESS OF BREATH: 0
FATIGUE: 1
ABDOMINAL PAIN: 0
VOMITING: 1
DIARRHEA: 0
SORE THROAT: 0
RHINORRHEA: 0
DIFFICULTY URINATING: 0
LIGHT-HEADEDNESS: 0
COUGH: 0
HEMATURIA: 0
FEVER: 0
HEADACHES: 1
NUMBNESS: 0
NAUSEA: 1
DIZZINESS: 0
CONSTIPATION: 0
FREQUENCY: 0
DYSURIA: 0

## 2017-11-02 NOTE — ED AVS SNAPSHOT
Emergency Department    [address] [facility] [address]    Phone:  [phone]    Fax:  [phone]                                       Aisha Fitzgerald   MRN: [id number]    Department:   Emergency Department   Date of Visit:  11/2/2017           Patient Information     Date Of Birth          1935        Your diagnoses for this visit were:     Non-intractable vomiting with nausea, unspecified vomiting type     Hypokalemia        You were seen by Leti Rankin MD.      Follow-up Information     Follow up with Janey Anderson In 3 days.    Specialty:  Physician Assistant    Contact information:    [facility]  [address]  [facility] [address] [phone]          Follow up with  Emergency Department.    Specialty:  EMERGENCY MEDICINE    Why:  If symptoms worsen    Contact information:    [address] [facility] [address] [phone]        Discharge Instructions       Use Zofran as needed for nausea and vomiting.  Tylenol as needed for headache.  Return with new or worsening symptoms particularly if you are unable to keep fluids down because you could get dehydrated again. Otherwise, see your regular doctor in the coming week.  Eat frequent, small, bland meals and drink plenty of fluids. Be sure to take in foods with potassium as yours was low today.       Discharge References/Attachments     VOMITING (6Y-ADULT) (ENGLISH)    HYPOKALEMIA (ENGLISH)      24 Hour Appointment Hotline       To make an appointment at any [place] clinic, call [phone] ([phone]). If you don't have a family doctor or clinic, we will help you find one. [place] clinics are conveniently located to serve the needs of you and your family.             Review of your medicines      START taking        Dose / Directions Last dose taken    ondansetron 4 MG ODT tab   Commonly known as:  ZOFRAN ODT   Dose:  4 mg   Quantity:  10 tablet        Take 1 tablet  (4 mg) by mouth every 8 hours as needed for nausea or vomiting   Refills:  0          Our records show that you are taking the medicines listed below. If these are incorrect, please call your family doctor or clinic.        Dose / Directions Last dose taken    albuterol 108 (90 BASE) MCG/ACT Inhaler   Commonly known as:  PROAIR HFA/PROVENTIL HFA/VENTOLIN HFA   Dose:  2-4 puff        Inhale 2-4 puffs into the lungs every 4 hours as needed   Refills:  0        ATORVASTATIN CALCIUM PO        Refills:  0        Calcium carb-Vitamin D 500 mg [place]-200 units 500-200 MG-UNIT per tablet   Commonly known as:  OSCAL with D;[place] Calcium   Dose:  0.5 tablet        Take 0.5 tablets by mouth daily   Refills:  0        CAYENNE PO   Dose:  1 tablet        Take 1 tablet by mouth daily Unknown dose   Refills:  0        CITALOPRAM HYDROBROMIDE PO   Dose:  20 mg        Take 20 mg by mouth daily.   Refills:  0        CLONIDINE HCL PO   Dose:  0.1 mg        Take 0.1 mg by mouth 2 times daily.   Refills:  0        COENZYME Q-10 PO   Dose:  100 mg        Take 100 mg by mouth daily.   Refills:  0        fluticasone-salmeterol 250-50 MCG/DOSE diskus inhaler   Commonly known as:  ADVAIR   Dose:  1 puff        Inhale 1 puff into the lungs 2 times daily   Refills:  0        GLIPIZIDE PO        Refills:  0        insulin glargine 100 UNIT/ML injection   Commonly known as:  LANTUS   Dose:  8 Units        Inject 8 Units Subcutaneous At Bedtime   Refills:  0        LEVOTHROID PO   Dose:  112 mcg        Take 112 mcg by mouth daily   Refills:  0        LOSARTAN POTASSIUM PO   Dose:  25 mg        Take 25 mg by mouth 2 times daily   Refills:  0        METOPROLOL SUCCINATE ER PO   Dose:  25 mg        Take 25 mg by mouth every evening   Refills:  0        NORVASC PO   Dose:  5 mg        Take 5 mg by mouth daily   Refills:  0        OMEPRAZOLE PO   Dose:  40 mg        Take 40 mg by mouth every morning   Refills:  0        sucralfate 1 GM tablet    Commonly known as:  CARAFATE        Take by mouth 4 times daily   Refills:  0        tiotropium 18 MCG capsule   Commonly known as:  SPIRIVA   Dose:  18 mcg        Inhale 18 mcg into the lungs daily   Refills:  0        VITAMIN D3 PO   Dose:  1000 Units        Take 1,000 Units by mouth daily.   Refills:  0                Prescriptions were sent or printed at these locations (1 Prescription)                   Other Prescriptions                Printed at Department/Unit printer (1 of 1)         ondansetron (ZOFRAN ODT) 4 MG ODT tab                Procedures and tests performed during your visit     Basic metabolic panel    CBC with platelets differential    EKG 12-lead, tracing only    Hepatic panel    Lactic acid    Lactic acid whole blood    Lipase    Peripheral IV: Standard    Potassium    UA with Microscopic    XR Abdomen 2 Views      Orders Needing Specimen Collection     None      Pending Results     No orders found from 10/31/2017 to 11/3/2017.            Pending Culture Results     No orders found from 10/31/2017 to 11/3/2017.            Pending Results Instructions     If you had any lab results that were not finalized at the time of your Discharge, you can call the ED Lab Result RN at [phone]. You will be contacted by this team for any positive Lab results or changes in treatment. The nurses are available 7 days a week from 10A to 6:30P.  You can leave a message 24 hours per day and they will return your call.        Test Results From Your Hospital Stay        11/2/2017 11:38 AM      Component Results     Component Value Ref Range & Units Status    Color Urine Light Yellow  Final    Appearance Urine Clear  Final    Glucose Urine 300 (A) NEG^Negative mg/dL Final    Bilirubin Urine Negative NEG^Negative Final    Ketones Urine 10 (A) NEG^Negative mg/dL Final    Specific Gravity Urine 1.007 1.003 - 1.035 Final    Blood Urine Trace (A) NEG^Negative Final    pH Urine 7.0 5.0 - 7.0 pH Final    Protein  Albumin Urine 30 (A) NEG^Negative mg/dL Final    Urobilinogen mg/dL Normal 0.0 - 2.0 mg/dL Final    Nitrite Urine Negative NEG^Negative Final    Leukocyte Esterase Urine Negative NEG^Negative Final    Source Midstream Urine  Final    WBC Urine 2 0 - 2 /HPF Final    RBC Urine 2 0 - 2 /HPF Final    Squamous Epithelial /HPF Urine <1 0 - 1 /HPF Final    Mucous Urine Present (A) NEG^Negative /LPF Final    Hyaline Casts 1 0 - 2 /LPF Final         11/2/2017  9:36 AM      Component Results     Component Value Ref Range & Units Status    Lactic Acid 2.4 (H) 0.7 - 2.0 mmol/L Final         11/2/2017  9:52 AM      Component Results     Component Value Ref Range & Units Status    Lipase 106 73 - 393 U/L Final         11/2/2017  9:52 AM      Component Results     Component Value Ref Range & Units Status    Sodium 132 (L) 133 - 144 mmol/L Final    Potassium 2.8 (L) 3.4 - 5.3 mmol/L Final    Chloride 94 94 - 109 mmol/L Final    Carbon Dioxide 25 20 - 32 mmol/L Final    Anion Gap 13 3 - 14 mmol/L Final    Glucose 206 (H) 70 - 99 mg/dL Final    Urea Nitrogen 17 7 - 30 mg/dL Final    Creatinine 0.80 0.52 - 1.04 mg/dL Final    GFR Estimate 69 >60 mL/min/1.7m2 Final    Non  GFR Calc    GFR Estimate If Black 84 >60 mL/min/1.7m2 Final    African American GFR Calc    Calcium 10.4 (H) 8.5 - 10.1 mg/dL Final         11/2/2017  9:54 AM      Component Results     Component Value Ref Range & Units Status    Bilirubin Direct 0.1 0.0 - 0.2 mg/dL Final    Bilirubin Total 0.7 0.2 - 1.3 mg/dL Final    Albumin 4.2 3.4 - 5.0 g/dL Final    Protein Total 8.5 6.8 - 8.8 g/dL Final    Alkaline Phosphatase 117 40 - 150 U/L Final    ALT 21 0 - 50 U/L Final    AST 16 0 - 45 U/L Final         11/2/2017  9:37 AM      Component Results     Component Value Ref Range & Units Status    WBC 13.0 (H) 4.0 - 11.0 10e9/L Final    RBC Count 5.36 (H) 3.8 - 5.2 10e12/L Final    Hemoglobin 14.2 11.7 - 15.7 g/dL Final    Hematocrit 41.9 35.0 - 47.0 % Final     MCV 78 78 - 100 fl Final    MCH 26.5 26.5 - 33.0 pg Final    MCHC 33.9 31.5 - 36.5 g/dL Final    RDW 14.2 10.0 - 15.0 % Final    Platelet Count 377 150 - 450 10e9/L Final    Diff Method Automated Method  Final    % Neutrophils 81.2 % Final    % Lymphocytes 5.9 % Final    % Monocytes 12.3 % Final    % Eosinophils 0.0 % Final    % Basophils 0.1 % Final    % Immature Granulocytes 0.5 % Final    Nucleated RBCs 0 0 /100 Final    Absolute Neutrophil 10.6 (H) 1.6 - 8.3 10e9/L Final    Absolute Lymphocytes 0.8 0.8 - 5.3 10e9/L Final    Absolute Monocytes 1.6 (H) 0.0 - 1.3 10e9/L Final    Absolute Eosinophils 0.0 0.0 - 0.7 10e9/L Final    Absolute Basophils 0.0 0.0 - 0.2 10e9/L Final    Abs Immature Granulocytes 0.1 0 - 0.4 10e9/L Final    Absolute Nucleated RBC 0.0  Final         11/2/2017 11:20 AM      Narrative     XR ABDOMEN 2 VW 11/2/2017 11:06 AM    HISTORY: Vomiting.    COMPARISON: January 20, 2013.        Impression     IMPRESSION: Moderate stool and gas in the colon. There is a mildly  distended loop of gas-filled small intestine in the midline, possibly  demonstrating bowel wall thickening. No pneumoperitoneum. No definite  radiographic evidence of obstruction.    YESENIA PIMENTEL MD         11/2/2017 12:40 PM      Component Results     Component Value Ref Range & Units Status    Lactic Acid 0.9 0.4 - 2.0 mmol/L Final         11/2/2017 12:53 PM      Component Results     Component Value Ref Range & Units Status    Potassium 3.3 (L) 3.4 - 5.3 mmol/L Final                Clinical Quality Measure: Blood Pressure Screening     Your blood pressure was checked while you were in the emergency department today. The last reading we obtained was  BP: 151/82 . Please read the guidelines below about what these numbers mean and what you should do about them.  If your systolic blood pressure (the top number) is less than 120 and your diastolic blood pressure (the bottom number) is less than 80, then your blood pressure is normal.  "There is nothing more that you need to do about it.  If your systolic blood pressure (the top number) is 120-139 or your diastolic blood pressure (the bottom number) is 80-89, your blood pressure may be higher than it should be. You should have your blood pressure rechecked within a year by a primary care provider.  If your systolic blood pressure (the top number) is 140 or greater or your diastolic blood pressure (the bottom number) is 90 or greater, you may have high blood pressure. High blood pressure is treatable, but if left untreated over time it can put you at risk for heart attack, stroke, or kidney failure. You should have your blood pressure rechecked by a primary care provider within the next 4 weeks.  If your provider in the emergency department today gave you specific instructions to follow-up with your doctor or provider even sooner than that, you should follow that instruction and not wait for up to 4 weeks for your follow-up visit.        Thank you for choosing [facility]       Thank you for choosing [facility] for your care. Our goal is always to provide you with excellent care. Hearing back from our patients is one way we can continue to improve our services. Please take a few minutes to complete the written survey that you may receive in the mail after you visit with us. Thank you!        The [facility] Information     [employer] lets you send messages to your doctor, view your test results, renew your prescriptions, schedule appointments and more. To sign up, go to [email or web address] . Click on \"Log in\" on the left side of the screen, which will take you to the Welcome page. Then click on \"Sign up Now\" on the right side of the page.     You will be asked to enter the access code listed below, as well as some personal information. Please follow the directions to create your username and password.     Your access code is: [email or web address]  Expires: 2018  2:46 PM     Your access code will  in 90 days. " If you need help or a new code, please call your [facility] clinic or [phone].        Care EveryWhere ID     This is your Care EveryWhere ID. This could be used by other organizations to access your [facility] medical records  [id number]        Equal Access to Services     MARI LUNDBERG : Aydee Curiel, wasita luqadaha, qaybta kaalmada fernanda, sudha laguerre. So [facility] [phone].    ATENCIÓN: Si habla español, tiene a no disposición servicios gratuitos de asistencia lingüística. Llame al [phone].    We comply with applicable federal civil rights laws and [place] laws. We do not discriminate on the basis of race, color, national origin, age, disability, sex, sexual orientation, or gender identity.            After Visit Summary       This is your record. Keep this with you and show to your community pharmacist(s) and doctor(s) at your next visit.

## 2017-11-02 NOTE — ED PROVIDER NOTES
History     Chief Complaint:  Nausea and Vomiting    The history is provided by the patient.      Aisha Fitzgerald is a 81 year old female with a history of IDDM and hypertension who presents with nausea and vomiting. The patient reports she developed nausea and vomiting on Tuesday night, 2 days ago. She reports she has been vomiting since and and has been unable to tolerate PO, prompting her to come to the ED for evaluation. On arrival, the patient reports she is nauseous and feels fatigued and generally weak because she hasn't been able to keep fluids down. She notes she has a headache as well that started around the time of the vomiting, but she states this is very mild. Notes it is frontal and dull in nature. She denies any abdominal pain, hematemesis, diarrhea, constipation, fever, dysuria, or other urinary symptoms. She denies any cough, sore throat, or runny nose. She denies any vision changes, dizziness, or light-headedness. She denies any sick contacts. She notes she ate a frozen pot pie on Tuesday, but otherwise hasn't eaten anything unusual. Her blood sugar was slightly elevated today, between 150-200. Of note, the patient was seen in the ED about 5 months ago with a similar presentation of vomiting without pain. Work up in the ED was negative and she was discharged to home. She states she had another episode of vomiting a few weeks ago that resolved on its own.      Allergies:  Hctz  Contrast dye     Medications:    Atorvastatin Calcium (LIPITOR PO)  calcium carb 1250 mg, 500 mg [place],/vitamin D 200 units (OSCAL WITH D) 500-200 MG-UNIT per tablet  insulin glargine (LANTUS) 100 UNIT/ML PEN  METOPROLOL SUCCINATE ER PO  METFORMIN HCL PO  fluticasone-salmeterol (ADVAIR) 250-50 MCG/DOSE diskus inhaler  tiotropium (SPIRIVA) 18 MCG inhalation capsule  OMEPRAZOLE PO  CLONIDINE HCL PO  LOSARTAN POTASSIUM PO  CITALOPRAM HYDROBROMIDE PO  AmLODIPine Besylate (NORVASC PO)  Levothyroxine Sodium (LEVOTHROID  "PO)  albuterol (PROVENTIL HFA: VENTOLIN HFA) 108 (90 BASE) MCG/ACT inhaler  ASPIRIN PO    Past Medical History:    Diabetes  Hypertension  Ulcer  Bronchiolitis  COPD    Past Surgical History:    Breast surgery  Eye surgery  Left ankle surgery    Family History:    History reviewed. No pertinent family history.     Social History:  Smoking status: Current some day smoker  Alcohol use: No  Presents to the ED with her friend   Marital Status:  Single [1]     Review of Systems   Constitutional: Positive for fatigue. Negative for fever.        Generalized weakness   HENT: Negative for congestion, rhinorrhea and sore throat.    Respiratory: Negative for cough and shortness of breath.    Gastrointestinal: Positive for nausea and vomiting. Negative for abdominal pain, constipation and diarrhea.   Genitourinary: Negative for difficulty urinating, dysuria, frequency and hematuria.   Neurological: Positive for headaches. Negative for dizziness, light-headedness and numbness.   All other systems reviewed and are negative.      Physical Exam   Patient Vitals for the past 24 hrs:   BP Temp Temp src Heart Rate Resp SpO2 Height Weight   11/02/17 1430 (!) 151/132 - - - - 94 % - -   11/02/17 1400 169/81 - - 106 16 97 % - -   11/02/17 1300 171/81 - - 108 18 97 % - -   11/02/17 1230 170/88 - - 111 18 95 % - -   11/02/17 1200 167/73 - - 110 16 90 % - -   11/02/17 1130 (!) 180/91 - - 113 18 96 % - -   11/02/17 1030 (!) 177/118 - - 120 16 94 % - -   11/02/17 0848 144/70 98  F (36.7  C) Oral 125 20 95 % 1.6 m (5' 3\") 69.4 kg (153 lb)       Physical Exam  General: Well-developed and well-nourished; well appearing  elderly woman; cooperative  Head:  Atraumatic  Eyes:  Extraocular movements intact; conjunctivae, lids, and sclerae are normal  ENT:    Normal nose; dry mucous membranes  Neck:  Supple; normal range of motion  CV:  Tachycardic rate and regular rhythm; normal heart sounds with no murmurs, rubs, or gallops " detected  Resp:  No respiratory distress; clear to auscultation bilaterally without decreased breath sounds, wheezing, rales, or rhonchi  GI:  Soft; non-distended; non-tender    MS:  Normal ROM  Skin:  Warm; non-diaphoretic; no pallor  Neuro:  Awake; A&Ox3; normal strength  Psych: Normal mood and affect; normal speech  Vitals reviewed.    Emergency Department Course   EKG  Indication: Tachycardia, rule out cardiac etiology  Time: 10:10:10  Rate 109 bpm. UT interval 176. QRS duration 110. QT/QTc 342/460.   Sinus tachycardia. Left axis deviation. Incomplete right bundle branch block. Anteroseptal infarct, age undetermined. Abnormal ECG.    No acute ST changes.  When compared to prior ECG dated 7/6/15, Incomplete right bundle branch block/QRS widening is new.  T wave inversion aVL more pronounced.     Imaging:  Radiographic findings were communicated with the patient who voiced understanding of the findings.    Abdomen XR 2 views  Moderate stool and gas in the colon. There is a mildly  distended loop of gas-filled small intestine in the midline, possibly  demonstrating bowel wall thickening. No pneumoperitoneum. No definite  radiographic evidence of obstruction.  As read by Radiology.    Laboratory:  CBC: WBC 13.0(H), o/w WNL (HGB 14.2, )   BMP (0912): (L), Potassium 2.8(L), Glucose 206(H), calcium 10.4(H), o/w WNL (Creatinine 0.80)  Hepatic panel: WNL  Lactic acid (0912): 2.4(H)  Lactic acid (1232): 0.9  Lipase: 106  UA: Glucose 300, Ketones 10, Blood trace, Protein albumin 30, Mucous present, o/w negative  Potassium (1231): 3.3(L)    Interventions:  0921: NS 1L IV Bolus  0921: Zofran 4 mg IV  1012: Potassium chloride 10 mEq IV  1018: NS 1L IV Bolus  1140: Potassium chloride 60 mEq oral  1142: Potassium chloride 10 mEq IV  1358: Zofran 4 mg IV  1415: Tylenol 1,000 mg oral     Emergency Department Course:  Past medical records, nursing notes, and vitals reviewed.  0855: I performed an exam of the patient  and obtained history, as documented above.  IV inserted and blood drawn. The patient was placed on continuous cardiac monitoring and pulse oximetry.  ECG obtained, results above.   The patient was sent for an abdomen x-ray while in the emergency department, findings above.    1144: I rechecked the patient. Explained findings to the patient. The patient reports she is feeling somewhat better. Tachycardia improving.    1316: I rechecked the patient and updated her of the findings. Patient is feeling better but still has a mild headache. Tachycardia improving.    1421: I rechecked the patient. Headache is improved and she is feeling better. Tachycardia continues to improve.    The patient passed a PO challenge prior to discharge from the ED.    I rechecked the patient.  Findings and plan explained to the patient. Patient discharged home with instructions regarding supportive care, medications, and reasons to return. The importance of close follow-up was reviewed.     Impression & Plan      CMS Diagnoses  Lactate is greater than 2 due to dehydration and vomiting, at this time there is no sign of sepsis. Lactate improved following IV fluids.     Medical Decision Making:  Aisha is an 81 year old female who presents with vomiting for 2 days. She notes no other symptoms including no diarrhea, fever, urinary symptoms. She has been somewhat constipated but thinks this is due to decreased PO intake. Her exam is unremarkable with the exception of tachycardia and dry mucous membranes. The patient was given IV fluid bolus and Zofran.     CBC reveals a mild leukocytosis to 13. LFTs and lipase are unremarkable. BMP reveals mild hyponatremia (132) that appears to be at patient's baseline. She also has hypokalemia to 2.8 which is just below her baseline (3.0). This was repleted with 20 mEq of KCl IV as well as 60 mEq of KCl by mouth. The patient was given a second liter of IV fluids during her stay after her lactic acid was found to  be elevated at 2.4. Urinalysis is not notable for infection, though does show mild ketonuria. KUB shows moderate stool and gas, as I would expect, and mildly distended loop that possibly could demonstrate bowel wall thickening. However, the patient has no abdominal pain and I do not believe this to be clinically relevant. I am reassured by the fact that she does not have evidence of obstruction. After her second liter of IV fluids (approximately 30 cc/kg), lactic acid was repeated and has normalized. I also repeated the potassium which is improved to 3.3 although she is still completing her IV potassium. The patient was reevaluated and states she is feeling better. She states she still has a headache. She was then given Tylenol and monitored.     After Tylenol, she was reevaluated and states her headache is improving and is amenable for discharge. She tolerated some crackers and lots of water while in the ER. Her tachycardia has slowly and steadily improved (125 to 106) and her mucous membranes seemed to become more moist throughout her stay. I discussed the importance of following up with her primary care provider and provided a prescription for Zofran as needed for nausea and vomiting. I also provided return precautions and answered all of her questions. She verbalized understanding. Written instructions were provided regarding frequent, small, bland meals and drinking plenty of fluids as well as taking a diet rich in potassium.     The etiology of the patient's vomiting is unclear at this time. However, the patient states she has had another episode like this for which she did not seek care recently and a third similar episode approximately 6 months ago when she was seen in the Emergency Department and had a negative work up at that time. Both episodes resolved spontaneously with supportive care. This is likely to resolve in a similar fashion. Patient to see PCP, though she may require GI referral in the future.  I believe significant intraabdominal pathology is unlikely given her exam and reassuring labs. She did seem somewhat dehydrated, but I also do not believe she warrants admission for continuous IV fluids as she is now tolerating PO.    Diagnosis:    ICD-10-CM   1. Non-intractable vomiting with nausea, unspecified vomiting type R11.2   2. Hypokalemia E87.6     Disposition: Discharged home    Discharge Medications:  New Prescriptions    ONDANSETRON (ZOFRAN ODT) 4 MG ODT TAB    Take 1 tablet (4 mg) by mouth every 8 hours as needed for nausea or vomiting     Jeri Barrera  11/2/2017    EMERGENCY DEPARTMENT    IJeri, am serving as a scribe at 8:55 AM on 11/2/2017 to document services personally performed by Leti Rankin MD based on my observations and the provider's statements to me.        Leti Rankin MD  11/02/17 1524

## 2017-11-02 NOTE — ED AVS SNAPSHOT
Emergency Department    [address] [facility] [place] [address]    Phone:  [phone]    Fax:  [phone]                                       Aisha Fitzgreald   MRN: [id number]    Department:   Emergency Department   Date of Visit:  11/2/2017           After Visit Summary Signature Page     I have received my discharge instructions, and my questions have been answered. I have discussed any challenges I see with this plan with the nurse or doctor.    ..........................................................................................................................................  Patient/Patient Representative Signature      ..........................................................................................................................................  Patient Representative Print Name and Relationship to Patient    ..................................................               ................................................  Date                                            Time    ..........................................................................................................................................  Reviewed by Signature/Title    ...................................................              ..............................................  Date                                                            Time

## 2018-08-30 ENCOUNTER — HOSPITAL ENCOUNTER (OUTPATIENT)
Dept: CARDIAC REHAB | Facility: CLINIC | Age: 83
End: 2018-08-30
Attending: INTERNAL MEDICINE
Payer: MEDICARE

## 2018-08-30 PROCEDURE — G0424 PULMONARY REHAB W EXER: HCPCS | Performed by: CLINICAL EXERCISE PHYSIOLOGIST

## 2018-08-30 PROCEDURE — 40000244 ZZH STATISTIC VISIT PULM REHAB: Performed by: CLINICAL EXERCISE PHYSIOLOGIST

## 2018-09-06 ENCOUNTER — HOSPITAL ENCOUNTER (OUTPATIENT)
Dept: CARDIAC REHAB | Facility: CLINIC | Age: 83
End: 2018-09-06
Attending: INTERNAL MEDICINE
Payer: MEDICARE

## 2018-09-06 PROCEDURE — 40000244 ZZH STATISTIC VISIT PULM REHAB: Performed by: CLINICAL EXERCISE PHYSIOLOGIST

## 2018-09-06 PROCEDURE — G0424 PULMONARY REHAB W EXER: HCPCS | Performed by: CLINICAL EXERCISE PHYSIOLOGIST

## 2018-09-11 ENCOUNTER — HOSPITAL ENCOUNTER (OUTPATIENT)
Dept: CARDIAC REHAB | Facility: CLINIC | Age: 83
End: 2018-09-11
Attending: INTERNAL MEDICINE
Payer: MEDICARE

## 2018-09-11 PROCEDURE — 40000244 ZZH STATISTIC VISIT PULM REHAB

## 2018-09-11 PROCEDURE — G0424 PULMONARY REHAB W EXER: HCPCS

## 2018-09-13 ENCOUNTER — HOSPITAL ENCOUNTER (OUTPATIENT)
Dept: CARDIAC REHAB | Facility: CLINIC | Age: 83
End: 2018-09-13
Attending: INTERNAL MEDICINE
Payer: MEDICARE

## 2018-09-13 PROCEDURE — 40000244 ZZH STATISTIC VISIT PULM REHAB: Performed by: CLINICAL EXERCISE PHYSIOLOGIST

## 2018-09-13 PROCEDURE — G0424 PULMONARY REHAB W EXER: HCPCS | Performed by: CLINICAL EXERCISE PHYSIOLOGIST

## 2018-09-20 ENCOUNTER — HOSPITAL ENCOUNTER (OUTPATIENT)
Dept: CARDIAC REHAB | Facility: CLINIC | Age: 83
End: 2018-09-20
Attending: INTERNAL MEDICINE
Payer: MEDICARE

## 2018-09-20 LAB
GLUCOSE BLDC GLUCOMTR-MCNC: 101 MG/DL (ref 70–99)
GLUCOSE BLDC GLUCOMTR-MCNC: 142 MG/DL (ref 70–99)

## 2018-09-20 PROCEDURE — G0424 PULMONARY REHAB W EXER: HCPCS

## 2018-09-20 PROCEDURE — 00000146 ZZHCL STATISTIC GLUCOSE BY METER IP

## 2018-09-20 PROCEDURE — 40000244 ZZH STATISTIC VISIT PULM REHAB

## 2018-09-21 ENCOUNTER — HOSPITAL ENCOUNTER (OUTPATIENT)
Dept: CARDIAC REHAB | Facility: CLINIC | Age: 83
End: 2018-09-21
Attending: INTERNAL MEDICINE
Payer: MEDICARE

## 2018-09-21 NOTE — PROGRESS NOTES
Aisha Fitzgerald  82 year old  COPD     [place] Face to Face: I have personally seen this patient today. This patient has been participating in pulmonary rehabilitation for 3 weeks and demonstrates a stable pulmonary respone to exercise, making appropriate progress toward goals. I encouraged her to exercise 1-2 times per week independently in addition to 2 days in rehab.  I agree with this individual treatment plan and exercise prescription. See individualized treatment plan for details of progress an plan.

## 2018-09-27 ENCOUNTER — HOSPITAL ENCOUNTER (OUTPATIENT)
Dept: CARDIAC REHAB | Facility: CLINIC | Age: 83
End: 2018-09-27
Attending: INTERNAL MEDICINE
Payer: MEDICARE

## 2018-09-27 PROCEDURE — 00000146 ZZHCL STATISTIC GLUCOSE BY METER IP

## 2018-09-27 PROCEDURE — G0424 PULMONARY REHAB W EXER: HCPCS | Performed by: CLINICAL EXERCISE PHYSIOLOGIST

## 2018-09-27 PROCEDURE — 40000244 ZZH STATISTIC VISIT PULM REHAB: Performed by: CLINICAL EXERCISE PHYSIOLOGIST

## 2018-09-28 LAB — GLUCOSE BLDC GLUCOMTR-MCNC: 196 MG/DL (ref 70–99)

## 2018-10-02 ENCOUNTER — HOSPITAL ENCOUNTER (OUTPATIENT)
Dept: CARDIAC REHAB | Facility: CLINIC | Age: 83
End: 2018-10-02
Attending: INTERNAL MEDICINE
Payer: MEDICARE

## 2018-10-02 LAB
GLUCOSE BLDC GLUCOMTR-MCNC: 165 MG/DL (ref 70–99)
GLUCOSE BLDC GLUCOMTR-MCNC: 203 MG/DL (ref 70–99)

## 2018-10-02 PROCEDURE — 40000244 ZZH STATISTIC VISIT PULM REHAB: Performed by: REHABILITATION PRACTITIONER

## 2018-10-02 PROCEDURE — 00000146 ZZHCL STATISTIC GLUCOSE BY METER IP

## 2018-10-02 PROCEDURE — G0424 PULMONARY REHAB W EXER: HCPCS | Performed by: REHABILITATION PRACTITIONER

## 2018-10-04 ENCOUNTER — HOSPITAL ENCOUNTER (OUTPATIENT)
Dept: CARDIAC REHAB | Facility: CLINIC | Age: 83
End: 2018-10-04
Attending: INTERNAL MEDICINE
Payer: MEDICARE

## 2018-10-04 PROCEDURE — G0424 PULMONARY REHAB W EXER: HCPCS | Performed by: CLINICAL EXERCISE PHYSIOLOGIST

## 2018-10-04 PROCEDURE — 40000244 ZZH STATISTIC VISIT PULM REHAB: Performed by: CLINICAL EXERCISE PHYSIOLOGIST

## 2018-10-09 ENCOUNTER — HOSPITAL ENCOUNTER (OUTPATIENT)
Dept: CARDIAC REHAB | Facility: CLINIC | Age: 83
End: 2018-10-09
Attending: INTERNAL MEDICINE
Payer: MEDICARE

## 2018-10-09 PROCEDURE — 40000244 ZZH STATISTIC VISIT PULM REHAB: Performed by: REHABILITATION PRACTITIONER

## 2018-10-09 PROCEDURE — G0424 PULMONARY REHAB W EXER: HCPCS | Performed by: REHABILITATION PRACTITIONER

## 2018-10-16 ENCOUNTER — HOSPITAL ENCOUNTER (OUTPATIENT)
Dept: CARDIAC REHAB | Facility: CLINIC | Age: 83
End: 2018-10-16
Attending: INTERNAL MEDICINE
Payer: MEDICARE

## 2018-10-16 PROCEDURE — 40000244 ZZH STATISTIC VISIT PULM REHAB: Performed by: REHABILITATION PRACTITIONER

## 2018-10-16 PROCEDURE — G0424 PULMONARY REHAB W EXER: HCPCS | Performed by: REHABILITATION PRACTITIONER

## 2018-10-18 ENCOUNTER — HOSPITAL ENCOUNTER (OUTPATIENT)
Dept: CARDIAC REHAB | Facility: CLINIC | Age: 83
End: 2018-10-18
Attending: INTERNAL MEDICINE
Payer: MEDICARE

## 2018-10-18 PROCEDURE — 40000244 ZZH STATISTIC VISIT PULM REHAB: Performed by: CLINICAL EXERCISE PHYSIOLOGIST

## 2018-10-18 PROCEDURE — G0424 PULMONARY REHAB W EXER: HCPCS | Performed by: CLINICAL EXERCISE PHYSIOLOGIST

## 2018-10-19 ENCOUNTER — HOSPITAL ENCOUNTER (OUTPATIENT)
Dept: CARDIAC REHAB | Facility: CLINIC | Age: 83
End: 2018-10-19
Attending: INTERNAL MEDICINE
Payer: MEDICARE

## 2018-10-19 PROCEDURE — G0424 PULMONARY REHAB W EXER: HCPCS

## 2018-10-19 PROCEDURE — 40000244 ZZH STATISTIC VISIT PULM REHAB

## 2018-10-19 NOTE — PROGRESS NOTES
I have personally seen this patient today.  The patient is currently participating in Pulmonary Rehabilitation, and demonstrates stable pulmonary response to exercise, making appropriate progress toward goals.  I agree with this individual treatment plan and exercise prescription  See individual treatment plan for details of progress and plan.  Patient reports that pulmonary rehab is going well. Her Hemoglobin A1C has decreased to 6.5. She hasn't noticed significant improvement with her strength and endurance, but consistently attends rehab twice a week and has starting walking one day a week outside of rehab. Patient is considering joining the [facility] program after she completes pulmonary rehab.

## 2018-10-23 ENCOUNTER — HOSPITAL ENCOUNTER (OUTPATIENT)
Dept: CARDIAC REHAB | Facility: CLINIC | Age: 83
End: 2018-10-23
Attending: INTERNAL MEDICINE
Payer: MEDICARE

## 2018-10-23 PROCEDURE — 40000244 ZZH STATISTIC VISIT PULM REHAB: Performed by: CLINICAL EXERCISE PHYSIOLOGIST

## 2018-10-23 PROCEDURE — G0424 PULMONARY REHAB W EXER: HCPCS | Performed by: CLINICAL EXERCISE PHYSIOLOGIST

## 2018-10-25 ENCOUNTER — HOSPITAL ENCOUNTER (OUTPATIENT)
Dept: CARDIAC REHAB | Facility: CLINIC | Age: 83
End: 2018-10-25
Attending: INTERNAL MEDICINE
Payer: MEDICARE

## 2018-10-25 PROCEDURE — G0424 PULMONARY REHAB W EXER: HCPCS | Performed by: CLINICAL EXERCISE PHYSIOLOGIST

## 2018-10-25 PROCEDURE — 40000244 ZZH STATISTIC VISIT PULM REHAB: Performed by: CLINICAL EXERCISE PHYSIOLOGIST

## 2018-10-30 ENCOUNTER — HOSPITAL ENCOUNTER (OUTPATIENT)
Dept: CARDIAC REHAB | Facility: CLINIC | Age: 83
End: 2018-10-30
Attending: INTERNAL MEDICINE
Payer: MEDICARE

## 2018-10-30 PROCEDURE — G0424 PULMONARY REHAB W EXER: HCPCS | Performed by: CLINICAL EXERCISE PHYSIOLOGIST

## 2018-10-30 PROCEDURE — 40000244 ZZH STATISTIC VISIT PULM REHAB: Performed by: CLINICAL EXERCISE PHYSIOLOGIST

## 2018-11-01 ENCOUNTER — HOSPITAL ENCOUNTER (OUTPATIENT)
Dept: CARDIAC REHAB | Facility: CLINIC | Age: 83
End: 2018-11-01
Attending: INTERNAL MEDICINE
Payer: MEDICARE

## 2018-11-01 PROCEDURE — G0424 PULMONARY REHAB W EXER: HCPCS | Performed by: REHABILITATION PRACTITIONER

## 2018-11-01 PROCEDURE — 40000244 ZZH STATISTIC VISIT PULM REHAB: Performed by: REHABILITATION PRACTITIONER

## 2018-11-06 ENCOUNTER — HOSPITAL ENCOUNTER (OUTPATIENT)
Dept: CARDIAC REHAB | Facility: CLINIC | Age: 83
End: 2018-11-06
Attending: INTERNAL MEDICINE
Payer: MEDICARE

## 2018-11-06 PROCEDURE — 40000244 ZZH STATISTIC VISIT PULM REHAB

## 2018-11-06 PROCEDURE — G0424 PULMONARY REHAB W EXER: HCPCS

## 2018-11-08 ENCOUNTER — HOSPITAL ENCOUNTER (OUTPATIENT)
Dept: CARDIAC REHAB | Facility: CLINIC | Age: 83
End: 2018-11-08
Attending: INTERNAL MEDICINE
Payer: MEDICARE

## 2018-11-08 PROCEDURE — G0424 PULMONARY REHAB W EXER: HCPCS | Performed by: REHABILITATION PRACTITIONER

## 2018-11-08 PROCEDURE — G0239 OTH RESP PROC, GROUP: HCPCS | Performed by: REHABILITATION PRACTITIONER

## 2018-11-08 PROCEDURE — 40000244 ZZH STATISTIC VISIT PULM REHAB: Performed by: REHABILITATION PRACTITIONER

## 2018-11-13 ENCOUNTER — HOSPITAL ENCOUNTER (OUTPATIENT)
Dept: CARDIAC REHAB | Facility: CLINIC | Age: 83
End: 2018-11-13
Attending: INTERNAL MEDICINE
Payer: MEDICARE

## 2018-11-13 PROCEDURE — G0424 PULMONARY REHAB W EXER: HCPCS | Performed by: REHABILITATION PRACTITIONER

## 2018-11-13 PROCEDURE — 40000244 ZZH STATISTIC VISIT PULM REHAB: Performed by: REHABILITATION PRACTITIONER

## 2018-11-16 ENCOUNTER — HOSPITAL ENCOUNTER (OUTPATIENT)
Dept: CARDIAC REHAB | Facility: CLINIC | Age: 83
End: 2018-11-16
Attending: INTERNAL MEDICINE
Payer: MEDICARE

## 2018-11-16 PROCEDURE — G0424 PULMONARY REHAB W EXER: HCPCS | Performed by: REHABILITATION PRACTITIONER

## 2018-11-16 PROCEDURE — 40000244 ZZH STATISTIC VISIT PULM REHAB: Performed by: REHABILITATION PRACTITIONER

## 2018-11-20 ENCOUNTER — HOSPITAL ENCOUNTER (OUTPATIENT)
Dept: CARDIAC REHAB | Facility: CLINIC | Age: 83
End: 2018-11-20
Attending: INTERNAL MEDICINE
Payer: MEDICARE

## 2018-11-20 PROCEDURE — G0424 PULMONARY REHAB W EXER: HCPCS

## 2018-11-20 PROCEDURE — 40000244 ZZH STATISTIC VISIT PULM REHAB

## 2018-11-25 ENCOUNTER — APPOINTMENT (OUTPATIENT)
Dept: GENERAL RADIOLOGY | Facility: CLINIC | Age: 83
End: 2018-11-25
Attending: EMERGENCY MEDICINE
Payer: MEDICARE

## 2018-11-25 ENCOUNTER — HOSPITAL ENCOUNTER (EMERGENCY)
Facility: CLINIC | Age: 83
Discharge: HOME | End: 2018-11-25
Attending: EMERGENCY MEDICINE | Admitting: EMERGENCY MEDICINE
Payer: MEDICARE

## 2018-11-25 VITALS
TEMPERATURE: 98.1 F | OXYGEN SATURATION: 91 % | DIASTOLIC BLOOD PRESSURE: 90 MMHG | RESPIRATION RATE: 16 BRPM | SYSTOLIC BLOOD PRESSURE: 181 MMHG | WEIGHT: 152 LBS | BODY MASS INDEX: 26.93 KG/M2

## 2018-11-25 DIAGNOSIS — I10 HYPERTENSION, UNSPECIFIED TYPE: ICD-10-CM

## 2018-11-25 DIAGNOSIS — J20.9 ACUTE BRONCHITIS, UNSPECIFIED ORGANISM: ICD-10-CM

## 2018-11-25 PROCEDURE — 99283 EMERGENCY DEPT VISIT LOW MDM: CPT

## 2018-11-25 PROCEDURE — 71046 X-RAY EXAM CHEST 2 VIEWS: CPT

## 2018-11-25 ASSESSMENT — ENCOUNTER SYMPTOMS
SHORTNESS OF BREATH: 1
NUMBNESS: 0
COUGH: 1
ABDOMINAL PAIN: 0
DIAPHORESIS: 0
FEVER: 0
CHILLS: 0

## 2018-11-25 NOTE — ED AVS SNAPSHOT
Emergency Department    [address] [facility] [address]    Phone:  [phone]    Fax:  [phone]                                       Aisha Fitzgerald   MRN: [id number]    Department:   Emergency Department   Date of Visit:  11/25/2018           Patient Information     Date Of Birth          1935        Your diagnoses for this visit were:     Acute bronchitis, unspecified organism     Hypertension, unspecified type        You were seen by Prasad Cook MD.      Follow-up Information     Please follow up.    Why:  document your blood pressure twice daily until follow up with your MD.  if you feel worse regarding respiratory symptoms return here        Discharge Instructions         Acute Bronchitis  Your healthcare provider has told you that you have acute bronchitis. Bronchitis is infection or inflammation of the bronchial tubes (airways in the lungs). Normally, air moves easily in and out of the airways. Bronchitis narrows the airways, making it harder for air to flow in and out of the lungs. This causes symptoms such as shortness of breath, coughing up yellow or green mucus, and wheezing. Bronchitis can be acute or chronic. Acute means the condition comes on quickly and goes away in a short time, usually within 3 to 10 days. Chronic means a condition lasts a long time and often comes back.    What causes acute bronchitis?  Acute bronchitis almost always starts as a viral respiratory infection, such as a cold or the flu. Certain factors make it more likely for a cold or flu to turn into bronchitis. These include being very young, being elderly, having a heart or lung problem, or having a weak immune system. Cigarette smoking also makes bronchitis more likely.  When bronchitis develops, the airways become swollen. The airways may also become infected with bacteria. This is known as a secondary infection.  Diagnosing acute bronchitis  Your healthcare provider will examine you  and ask about your symptoms and health history. You may also have a sputum culture to test the fluid in your lungs. Chest X-rays may be done to look for infection in the lungs.  Treating acute bronchitis  Bronchitis usually clears up as the cold or flu goes away. You can help feel better faster by doing the following:    Take medicine as directed. You may be told to take ibuprofen or other over-the-counter medicines. These help relieve inflammation in your bronchial tubes. Your healthcare provider may prescribe an inhaler to help open up the bronchial tubes. Most of the time, acute bronchitis is caused by a viral infection. Antibiotics are usually not prescribed for viral infections.    Drink plenty of fluids, such as water, juice, or warm soup. Fluids loosen mucus so that you can cough it up. This helps you breathe more easily. Fluids also prevent dehydration.    Make sure you get plenty of rest.    Do not smoke. Do not allow anyone else to smoke in your home.  Recovery and follow-up  Follow up with your doctor as you are told. You will likely feel better in a week or two. But a dry cough can linger beyond that time. Let your doctor know if you still have symptoms (other than a dry cough) after 2 weeks, or if you re prone to getting bronchial infections. Take steps to protect yourself from future infections. These steps include stopping smoking and avoiding tobacco smoke, washing your hands often, and getting a yearly flu shot.  When to call your healthcare provider  Call the healthcare provider if you have any of the following:    Fever of 100.4 F (38.0 C) or higher, or as advised    Symptoms that get worse, or new symptoms    Trouble breathing    Symptoms that don t start to improve within a week, or within 3 days of taking antibiotics   Date Last Reviewed: 12/1/2016 2000-2018 [employer]. [address] [place] [address], [place] [address]. All rights reserved. This information is not intended as a  substitute for professional medical care. Always follow your healthcare professional's instructions.      Uncontrolled High Blood Pressure (Established)    Your blood pressure was unusually high today. This can occur if you ve missed doses of your blood pressure medicine. Or it can happen if you are taking other medicines. These include some asthma inhalers, decongestants, diet pills, and street drugs like cocaine and amphetamine.  Other causes include:    Weight gain    More salt in your diet    Smoking    Caffeine  Your blood pressure can also rise if you are emotionally upset or in intense pain. It may go back to normal after a period of rest.  Blood pressure measurements are given as 2 numbers. Systolic blood pressure is the upper number. This is the pressure when the heart contracts. Diastolic blood pressure is the lower number. This is the pressure when the heart relaxes between beats. You will see your blood pressure readings written together. For example, a person with a systolic pressure of 118 and a diastolic pressure of 78 will have 118/78 written in the medical record. To be high blood pressure, the numbers must be higher when tested over a period of time.  Blood pressure is categorized as normal, elevated, or stage 1 or stage 2 high blood pressure:    Normal blood pressure is systolic of less than 120 and diastolic of less than 80 (120/80)    Elevated blood pressure is systolic of 120 to 129 and diastolic less than 80    Stage 1 high blood pressure is systolic is 130 to 139 or diastolic between 80 to 89    Stage 2 high blood pressure is when systolic is 140 or higher or the diastolic is 90 or higher  Uncontrolled high blood pressure can cause serious health problems. It raises your risk for heart attack, stroke, and heart failure. In general, if you have high blood pressure, keeping your blood pressure below 130/80 mmHg may help prevent these problems. Your healthcare provider may prescribe medicine to  help control blood pressure if lifestyle changes are not enough.  Home care  It s important to take steps to lower your blood pressure. If you are taking blood pressure medicine, the guidelines below may help you need less or no medicines in the future.    Start a weight-loss program if you are overweight.    Cut back on the amount of salt in your diet:  ? Avoid high-salt foods like olives, pickles, smoked meats, and salted potato chips.  ? Don t add salt to your food at the table.  ? Use only small amounts of salt when cooking.    Start an exercise program. Talk with your healthcare provider about what exercise program is best for you. It doesn t have to be difficult. Even brisk walking for 20 minutes 3 times a week is a good form of exercise.    Avoid medicines that stimulates the heart. This includes many over-the-counter cold and sinus decongestant pills and sprays, as well as diet pills. Check the warnings about high blood pressure on the label. Before purchasing any over-the-counter medicines or supplements, always ask the pharmacist about the product's potential interaction with your high blood pressure and your medicines.    Stimulants such as amphetamine or cocaine could be lethal for someone with high blood pressure. Never take these.    Limit how much caffeine you drink. Or switch to noncaffeinated beverages.    Stop smoking. If you are a long-time smoker, this can be hard. Enroll in a stop-smoking program to make it more likely that you will succeed. Talk with your provider about ways to quit.    Learn how to handle stress better. This is an important part of any program to lower blood pressure. Learn ways to relax. These include meditation, yoga, and biofeedback.    If medicines were prescribed, take them exactly as directed. Missing doses may cause your blood pressure to get out of control.    If you miss a dose or doses of your medicines, check with your healthcare provider or pharmacist about what to  do.    Consider buying an automatic blood pressure machine. Your provider may recommend a certain type. You can get one of these at most pharmacies. Measure your blood pressure twice a day, in the morning, and in the late afternoon. Keep a written record of your home blood pressure readings and take the record to your medical appointments.  Here are some additional guidelines on home blood pressure monitoring from the American Heart Association.    Don't smoke or drink coffee for 30 minutes    Go to the bathroom before the test.    Relax for 5 minutes before taking the measurement.    Sit correctly. Be sure your back is supported. Don't sit on a couch or soft chair. Uncross your feet and place them flat on the floor. Place your arm on a solid, flat surface like a table with the upper arm at heart level. Make certain the middle of the cuff is directly above the bend of the elbow. Check the monitor's instruction manual for an illustration.    Take multiple readings. When you measure, take 2 or 3 readings one minute apart and record all of the results.    Take your blood pressure at the same time every day, or as your healthcare provider recommends.    Record the date, time, and blood pressure reading.    Take the record with you to your next appointment. If your blood pressure monitor has a built-in memory, simply take the monitor with you to your next appointment.    Call your provider if you have several high readings. Don't be frightened by a single high reading, but if you get several high readings, check in with your healthcare provider.    Note: When blood pressure reaches a systolic (top number) of 180 or higher or a diastolic (bottom number) of 110 or higher, emergency medical treatment is required. Call your healthcare provider immediately.  Follow-up care  Regular visits to your own healthcare provider for blood pressure and medicine checks are an important part of your care. Make a follow-up appointment as  directed. Bring the record of your home blood pressure readings to the appointment.  When to seek medical advice  Call your healthcare provider right away if any of these occur:    Blood pressure reaches a systolic (top number) of 180 or higher or diastolic (bottom number) of 110 or higher, emergency medical treatment is required.    Chest, arm, shoulder, neck, or upper back pain    Shortness of breath    Severe headache    Throbbing or rushing sound in the ears    Nosebleed    Extreme drowsiness, confusion, or fainting    Dizziness or dizziness with spinning sensation (vertigo)    Weakness in an arm or leg or on one side of the face    Trouble speaking or seeing   Date Last Reviewed: 1/1/2017 2000-2018 [employer]. [address] [place] [address], [place] [address]. All rights reserved. This information is not intended as a substitute for professional medical care. Always follow your healthcare professional's instructions.            Your next 10 appointments already scheduled     Nov 27, 2018  9:30 AM CST   Pulmonary Treatment with  Pulmonary Rehab 2   [facility] ([facility])    [address], [address]  [facility] [address]   [phone]            Nov 29, 2018  9:30 AM CST   Pulmonary Treatment with  Pulmonary Rehab 2   [facility] ([facility])    [address], [address]  [facility] [address]   [phone]            Dec 04, 2018  9:30 AM CST   Pulmonary Treatment with  Pulmonary Rehab 2   [facility] ([facility])    [address], [address]  [facility] [address]   [phone]              24 Hour Appointment Hotline       To make an appointment at any [facility], call [phone] ([phone]). If you don't have a family doctor or clinic, we will help you find one. [facility] clinics are conveniently located to serve the needs of you and your family.              Review of your medicines      Our records show that you are taking the medicines listed below. If these are incorrect, please call your family doctor or clinic.        Dose / Directions Last dose taken    albuterol 108 (90 Base) MCG/ACT inhaler   Commonly known as:  PROAIR HFA/PROVENTIL HFA/VENTOLIN HFA   Dose:  2-4 puff        Inhale 2-4 puffs into the lungs every 4 hours as needed   Refills:  0        ATORVASTATIN CALCIUM PO        Refills:  0        calcium carbonate 500 mg-vitamin D 200 units 500-200 MG-UNIT per tablet   Commonly known as:  OSCAL with D;OYSTER SHELL CALCIUM   Dose:  0.5 tablet        Take 0.5 tablets by mouth daily   Refills:  0        CAYENNE PO   Dose:  1 tablet        Take 1 tablet by mouth daily Unknown dose   Refills:  0        CITALOPRAM HYDROBROMIDE PO   Dose:  20 mg        Take 20 mg by mouth daily.   Refills:  0        CLONIDINE HCL PO   Dose:  0.1 mg        Take 0.1 mg by mouth 2 times daily.   Refills:  0        COENZYME Q-10 PO   Dose:  100 mg        Take 100 mg by mouth daily.   Refills:  0        fluticasone-salmeterol 250-50 MCG/DOSE inhaler   Commonly known as:  ADVAIR   Dose:  1 puff        Inhale 1 puff into the lungs 2 times daily   Refills:  0        GLIPIZIDE PO        Refills:  0        insulin glargine 100 UNIT/ML pen   Commonly known as:  LANTUS PEN   Dose:  8 Units        Inject 8 Units Subcutaneous At Bedtime   Refills:  0        LEVOTHROID PO   Dose:  112 mcg        Take 112 mcg by mouth daily   Refills:  0        LOSARTAN POTASSIUM PO   Dose:  25 mg        Take 25 mg by mouth 2 times daily   Refills:  0        METOPROLOL SUCCINATE ER PO   Dose:  25 mg        Take 25 mg by mouth every evening   Refills:  0        NORVASC PO   Dose:  5 mg        Take 5 mg by mouth daily   Refills:  0        OMEPRAZOLE PO   Dose:  40 mg        Take 40 mg by mouth every morning   Refills:  0        sucralfate 1 GM tablet   Commonly known as:  CARAFATE        Take by mouth 4 times  daily   Refills:  0        tiotropium 18 MCG inhaled capsule   Commonly known as:  SPIRIVA   Dose:  18 mcg        Inhale 18 mcg into the lungs daily   Refills:  0        VITAMIN D3 PO   Dose:  1000 Units        Take 1,000 Units by mouth daily.   Refills:  0                Procedures and tests performed during your visit     Cardiac Continuous Monitoring    Chest XR,  PA & LAT      Orders Needing Specimen Collection     None      Pending Results     Date and Time Order Name Status Description    11/25/2018 1045 Chest XR,  PA & LAT Preliminary             Pending Culture Results     No orders found from 11/23/2018 to 11/26/2018.            Pending Results Instructions     If you had any lab results that were not finalized at the time of your Discharge, you can call the ED Lab Result RN at [phone]. You will be contacted by this team for any positive Lab results or changes in treatment. The nurses are available 7 days a week from 10A to 6:30P.  You can leave a message 24 hours per day and they will return your call.        Test Results From Your Hospital Stay        11/25/2018 11:06 AM      Narrative     CHEST TWO VIEWS    11/25/2018 11:01 AM     HISTORY: Productive cough.     COMPARISON: 6/5/2016.        Impression     IMPRESSION: New ill-defined linear opacity at the right medial low  chest. This could be atelectasis versus developing airspace disease.  Stable right lung base scarring versus trace effusion. Borderline  cardiomegaly appears larger since 2016.                Clinical Quality Measure: Blood Pressure Screening     Your blood pressure was checked while you were in the emergency department today. The last reading we obtained was  BP: 181/90 . Please read the guidelines below about what these numbers mean and what you should do about them.  If your systolic blood pressure (the top number) is less than 120 and your diastolic blood pressure (the bottom number) is less than 80, then your blood pressure is  normal. There is nothing more that you need to do about it.  If your systolic blood pressure (the top number) is 120-139 or your diastolic blood pressure (the bottom number) is 80-89, your blood pressure may be higher than it should be. You should have your blood pressure rechecked within a year by a primary care provider.  If your systolic blood pressure (the top number) is 140 or greater or your diastolic blood pressure (the bottom number) is 90 or greater, you may have high blood pressure. High blood pressure is treatable, but if left untreated over time it can put you at risk for heart attack, stroke, or kidney failure. You should have your blood pressure rechecked by a primary care provider within the next 4 weeks.  If your provider in the emergency department today gave you specific instructions to follow-up with your doctor or provider even sooner than that, you should follow that instruction and not wait for up to 4 weeks for your follow-up visit.        Thank you for choosing [facility]       Thank you for choosing [facility] for your care. Our goal is always to provide you with excellent care. Hearing back from our patients is one way we can continue to improve our services. Please take a few minutes to complete the written survey that you may receive in the mail after you visit with us. Thank you!        Care EveryWhere ID     This is your Care EveryWhere ID. This could be used by other organizations to access your [facility] medical records  [id number]        Equal Access to Services     MARI LUNDBERG : Aydee Curiel, waaxda luqadaha, qaybta kaalmameri michael, sudha laguerre. So [facility] [phone].    ATENCIÓN: Si habla español, tiene a no disposición servicios gratuitos de asistencia lingüística. Llame al [phone].    We comply with applicable federal civil rights laws and [place] laws. We do not discriminate on the basis of race, color, national origin, age,  disability, sex, sexual orientation, or gender identity.            After Visit Summary       This is your record. Keep this with you and show to your community pharmacist(s) and doctor(s) at your next visit.

## 2018-11-25 NOTE — ED PROVIDER NOTES
History     Chief Complaint:  Cough    HPI   Aisha Fitzgerald is a 82 year old female who presents with a cough. The patient states that ever since Thanksgiving, she has had a productive cough, leading to mild shortness of breath. Due to the persistency of the cough, the patient visited the urgent care this morning to be further evaluated. Upon examination at the urgent care, they noticed the patient's blood pressure was elevated, prompting them to advise her to visigoth the emergency department for proper care. The patient states that she usually is around 130, and at the urgent care she was over 200. The patient denies any numbness or tingling, nausea or vomiting, chest pain, body aches, chills or sweats, change in medication, or any other associated symptoms.     Allergies:  Hydrochlorothiazide  Contrast dye    Medications:    Losartan Potassium  Metoprolol Succinate  Albuterol  Norvasc  Atorvastatin  Oscal with D  Cayenne  Vitamin D3  Citalopram hydrobromide  Clonidine  Coenzyme  Advair  Glipizide  Lantus  Levothroid  Omeprazole  Carafate  Spiriva    Past Medical History:    Bronchiolitis  Diabetes  Hypertension  Ulcer    Past Surgical History:    Breast surgery  Eye surgery  Left ankle surgery    Family History:    The patient denies any relevant family medical history.    Social History:  Marital Status: Single  Smoking Status: Some days  Alcohol Use No    Review of Systems   Constitutional: Negative for chills, diaphoresis and fever.   Respiratory: Positive for cough and shortness of breath.    Cardiovascular: Negative for chest pain.   Gastrointestinal: Negative for abdominal pain.   Neurological: Negative for numbness.   All other systems reviewed and are negative.    Physical Exam     Patient Vitals for the past 24 hrs:   BP Temp Temp src Heart Rate Resp SpO2 Weight   11/25/18 1130 181/90 - - - - 91 % -   11/25/18 1115 192/88 - - - - 93 % -   11/25/18 1030 199/75 98.1  F (36.7  C) Oral 87 16 93 % 68.9 kg  (152 lb)     Physical Exam  SKIN:  Warm, dry.  HEMATOLOGIC/IMMUNOLOGIC/LYMPHATIC:  No pallor.  No edema.  HENT:  Moist oral mucosa.  No stridor.  No JVD.  EYES:  Conjunctivae normal.    CARDIOVASCULAR:  Regular rate and rhythm.  No murmur.  RESPIRATORY:  No respiratory distress, breath sounds equal and normal.  GASTROINTESTINAL:  Soft, nontender abdomen.  MUSCULOSKELETAL:  Normal body habitus.  NEUROLOGIC:  Alert, conversant.  Oriented.  No aphasia or dysarthria.   No motor or sensory deficit.  PSYCHIATRIC:  Normal mood.    Emergency Department Course     Imaging:  Radiographic findings were communicated with the patient who voiced understanding of the findings.    Chest XR, PA & LAT:  New ill-defined linear opacity at the right medial low  chest. This could be atelectasis versus developing airspace disease.  Stable right lung base scarring versus trace effusion. Borderline  cardiomegaly appears larger since 2016.  Per Radiologist.     Emergency Department Course:  The patient arrived in the emergency department via EMS.  Past medical records, nursing notes, and vitals reviewed.  1037: I performed an exam of the patient and obtained history, as documented above.    The patient was taken for chest XR, see imaging results above.     I rechecked the patient. Findings and plan explained to the Patient. Patient discharged home with instructions regarding supportive care, medications, and reasons to return. The importance of close follow-up was reviewed.     Impression & Plan      Medical Decision Making:  Aisha Fitzgerald is a 82 year old female who presents from the urgent care to the ED for evaluation of an elevated blood pressure although she is asymptomatic relative to that. Here her blood pressure dropped systolically from 213 to 181 and she continued to feel well with respect to hypertension. Considered pneumonia, see imaging. Likely suffered bronchitis. She feels well enough to be discharged to home. I advised to  keep a blood pressure diary measuring twice a day until primary follow up for re check, at that point they may change her blood pressure management if need be if still hypertensive. With respect to her respiratory illness I advised her to return if any concerns or feeling worse.     Diagnosis:    ICD-10-CM   1. Acute bronchitis, unspecified organism J20.9   2. Hypertension, unspecified type I10       Disposition:  discharged to home    Discharge Medications:  New Prescriptions    No medications on file         Renato Marc  11/25/2018    EMERGENCY DEPARTMENT    IRenato am serving as a scribe at 10:37 AM on 11/25/2018 to document services personally performed by Prasad Cook MD based on my observations and the provider's statements to me.        Prasad Cook MD  11/25/18 9127

## 2018-11-25 NOTE — DISCHARGE INSTRUCTIONS
Acute Bronchitis  Your healthcare provider has told you that you have acute bronchitis. Bronchitis is infection or inflammation of the bronchial tubes (airways in the lungs). Normally, air moves easily in and out of the airways. Bronchitis narrows the airways, making it harder for air to flow in and out of the lungs. This causes symptoms such as shortness of breath, coughing up yellow or green mucus, and wheezing. Bronchitis can be acute or chronic. Acute means the condition comes on quickly and goes away in a short time, usually within 3 to 10 days. Chronic means a condition lasts a long time and often comes back.    What causes acute bronchitis?  Acute bronchitis almost always starts as a viral respiratory infection, such as a cold or the flu. Certain factors make it more likely for a cold or flu to turn into bronchitis. These include being very young, being elderly, having a heart or lung problem, or having a weak immune system. Cigarette smoking also makes bronchitis more likely.  When bronchitis develops, the airways become swollen. The airways may also become infected with bacteria. This is known as a secondary infection.  Diagnosing acute bronchitis  Your healthcare provider will examine you and ask about your symptoms and health history. You may also have a sputum culture to test the fluid in your lungs. Chest X-rays may be done to look for infection in the lungs.  Treating acute bronchitis  Bronchitis usually clears up as the cold or flu goes away. You can help feel better faster by doing the following:    Take medicine as directed. You may be told to take ibuprofen or other over-the-counter medicines. These help relieve inflammation in your bronchial tubes. Your healthcare provider may prescribe an inhaler to help open up the bronchial tubes. Most of the time, acute bronchitis is caused by a viral infection. Antibiotics are usually not prescribed for viral infections.    Drink plenty of fluids, such as  water, juice, or warm soup. Fluids loosen mucus so that you can cough it up. This helps you breathe more easily. Fluids also prevent dehydration.    Make sure you get plenty of rest.    Do not smoke. Do not allow anyone else to smoke in your home.  Recovery and follow-up  Follow up with your doctor as you are told. You will likely feel better in a week or two. But a dry cough can linger beyond that time. Let your doctor know if you still have symptoms (other than a dry cough) after 2 weeks, or if you re prone to getting bronchial infections. Take steps to protect yourself from future infections. These steps include stopping smoking and avoiding tobacco smoke, washing your hands often, and getting a yearly flu shot.  When to call your healthcare provider  Call the healthcare provider if you have any of the following:    Fever of 100.4 F (38.0 C) or higher, or as advised    Symptoms that get worse, or new symptoms    Trouble breathing    Symptoms that don t start to improve within a week, or within 3 days of taking antibiotics   Date Last Reviewed: 12/1/2016 2000-2018 [employer]. [address] [place] [address]. All rights reserved. This information is not intended as a substitute for professional medical care. Always follow your healthcare professional's instructions.      Uncontrolled High Blood Pressure (Established)    Your blood pressure was unusually high today. This can occur if you ve missed doses of your blood pressure medicine. Or it can happen if you are taking other medicines. These include some asthma inhalers, decongestants, diet pills, and street drugs like cocaine and amphetamine.  Other causes include:    Weight gain    More salt in your diet    Smoking    Caffeine  Your blood pressure can also rise if you are emotionally upset or in intense pain. It may go back to normal after a period of rest.  Blood pressure measurements are given as 2 numbers. Systolic blood pressure is  the upper number. This is the pressure when the heart contracts. Diastolic blood pressure is the lower number. This is the pressure when the heart relaxes between beats. You will see your blood pressure readings written together. For example, a person with a systolic pressure of 118 and a diastolic pressure of 78 will have 118/78 written in the medical record. To be high blood pressure, the numbers must be higher when tested over a period of time.  Blood pressure is categorized as normal, elevated, or stage 1 or stage 2 high blood pressure:    Normal blood pressure is systolic of less than 120 and diastolic of less than 80 (120/80)    Elevated blood pressure is systolic of 120 to 129 and diastolic less than 80    Stage 1 high blood pressure is systolic is 130 to 139 or diastolic between 80 to 89    Stage 2 high blood pressure is when systolic is 140 or higher or the diastolic is 90 or higher  Uncontrolled high blood pressure can cause serious health problems. It raises your risk for heart attack, stroke, and heart failure. In general, if you have high blood pressure, keeping your blood pressure below 130/80 mmHg may help prevent these problems. Your healthcare provider may prescribe medicine to help control blood pressure if lifestyle changes are not enough.  Home care  It s important to take steps to lower your blood pressure. If you are taking blood pressure medicine, the guidelines below may help you need less or no medicines in the future.    Start a weight-loss program if you are overweight.    Cut back on the amount of salt in your diet:  ? Avoid high-salt foods like olives, pickles, smoked meats, and salted potato chips.  ? Don t add salt to your food at the table.  ? Use only small amounts of salt when cooking.    Start an exercise program. Talk with your healthcare provider about what exercise program is best for you. It doesn t have to be difficult. Even brisk walking for 20 minutes 3 times a week is a  good form of exercise.    Avoid medicines that stimulates the heart. This includes many over-the-counter cold and sinus decongestant pills and sprays, as well as diet pills. Check the warnings about high blood pressure on the label. Before purchasing any over-the-counter medicines or supplements, always ask the pharmacist about the product's potential interaction with your high blood pressure and your medicines.    Stimulants such as amphetamine or cocaine could be lethal for someone with high blood pressure. Never take these.    Limit how much caffeine you drink. Or switch to noncaffeinated beverages.    Stop smoking. If you are a long-time smoker, this can be hard. Enroll in a stop-smoking program to make it more likely that you will succeed. Talk with your provider about ways to quit.    Learn how to handle stress better. This is an important part of any program to lower blood pressure. Learn ways to relax. These include meditation, yoga, and biofeedback.    If medicines were prescribed, take them exactly as directed. Missing doses may cause your blood pressure to get out of control.    If you miss a dose or doses of your medicines, check with your healthcare provider or pharmacist about what to do.    Consider buying an automatic blood pressure machine. Your provider may recommend a certain type. You can get one of these at most pharmacies. Measure your blood pressure twice a day, in the morning, and in the late afternoon. Keep a written record of your home blood pressure readings and take the record to your medical appointments.  Here are some additional guidelines on home blood pressure monitoring from the American [employer] Association.    Don't smoke or drink coffee for 30 minutes    Go to the bathroom before the test.    Relax for 5 minutes before taking the measurement.    Sit correctly. Be sure your back is supported. Don't sit on a couch or soft chair. Uncross your feet and place them flat on the floor.  Place your arm on a solid, flat surface like a table with the upper arm at heart level. Make certain the middle of the cuff is directly above the bend of the elbow. Check the monitor's instruction manual for an illustration.    Take multiple readings. When you measure, take 2 or 3 readings one minute apart and record all of the results.    Take your blood pressure at the same time every day, or as your healthcare provider recommends.    Record the date, time, and blood pressure reading.    Take the record with you to your next appointment. If your blood pressure monitor has a built-in memory, simply take the monitor with you to your next appointment.    Call your provider if you have several high readings. Don't be frightened by a single high reading, but if you get several high readings, check in with your healthcare provider.    Note: When blood pressure reaches a systolic (top number) of 180 or higher or a diastolic (bottom number) of 110 or higher, emergency medical treatment is required. Call your healthcare provider immediately.  Follow-up care  Regular visits to your own healthcare provider for blood pressure and medicine checks are an important part of your care. Make a follow-up appointment as directed. Bring the record of your home blood pressure readings to the appointment.  When to seek medical advice  Call your healthcare provider right away if any of these occur:    Blood pressure reaches a systolic (top number) of 180 or higher or diastolic (bottom number) of 110 or higher, emergency medical treatment is required.    Chest, arm, shoulder, neck, or upper back pain    Shortness of breath    Severe headache    Throbbing or rushing sound in the ears    Nosebleed    Extreme drowsiness, confusion, or fainting    Dizziness or dizziness with spinning sensation (vertigo)    Weakness in an arm or leg or on one side of the face    Trouble speaking or seeing   Date Last Reviewed: 1/1/2017 2000-2018 The  [employer]. [address] [place] [address], [place] [address]. All rights reserved. This information is not intended as a substitute for professional medical care. Always follow your healthcare professional's instructions.

## 2018-11-25 NOTE — ED AVS SNAPSHOT
Emergency Department    [address] [facility] [address]    Phone:  [phone]    Fax:  [phone]                                       iAsha Fitzgerald   MRN: [id number]    Department:   Emergency Department   Date of Visit:  11/25/2018           After Visit Summary Signature Page     I have received my discharge instructions, and my questions have been answered. I have discussed any challenges I see with this plan with the nurse or doctor.    ..........................................................................................................................................  Patient/Patient Representative Signature      ..........................................................................................................................................  Patient Representative Print Name and Relationship to Patient    ..................................................               ................................................  Date                                   Time    ..........................................................................................................................................  Reviewed by Signature/Title    ...................................................              ..............................................  Date                                               Time          22EPIC Rev 08/18

## 2018-11-25 NOTE — ED NOTES
Bed: ED09  Expected date:   Expected time:   Means of arrival:   Comments:  Bob [id number] congestion and cough for 5 days HTN 82 female

## 2018-12-04 ENCOUNTER — HOSPITAL ENCOUNTER (OUTPATIENT)
Dept: CARDIAC REHAB | Facility: CLINIC | Age: 83
End: 2018-12-04
Attending: INTERNAL MEDICINE
Payer: MEDICARE

## 2018-12-04 PROCEDURE — 40000244 ZZH STATISTIC VISIT PULM REHAB: Performed by: REHABILITATION PRACTITIONER

## 2018-12-04 PROCEDURE — G0424 PULMONARY REHAB W EXER: HCPCS | Performed by: REHABILITATION PRACTITIONER

## 2018-12-11 ENCOUNTER — HOSPITAL ENCOUNTER (OUTPATIENT)
Dept: CARDIAC REHAB | Facility: CLINIC | Age: 83
End: 2018-12-11
Attending: INTERNAL MEDICINE
Payer: MEDICARE

## 2018-12-11 PROCEDURE — 40000244 ZZH STATISTIC VISIT PULM REHAB

## 2018-12-11 PROCEDURE — G0424 PULMONARY REHAB W EXER: HCPCS

## 2018-12-14 ENCOUNTER — HOSPITAL ENCOUNTER (OUTPATIENT)
Dept: CARDIAC REHAB | Facility: CLINIC | Age: 83
End: 2018-12-14
Attending: INTERNAL MEDICINE
Payer: MEDICARE

## 2018-12-14 PROCEDURE — G0424 PULMONARY REHAB W EXER: HCPCS

## 2018-12-14 PROCEDURE — 40000244 ZZH STATISTIC VISIT PULM REHAB

## 2018-12-20 ENCOUNTER — HOSPITAL ENCOUNTER (OUTPATIENT)
Dept: CARDIAC REHAB | Facility: CLINIC | Age: 83
End: 2018-12-20
Attending: INTERNAL MEDICINE
Payer: MEDICARE

## 2018-12-20 PROCEDURE — G0424 PULMONARY REHAB W EXER: HCPCS | Performed by: CLINICAL EXERCISE PHYSIOLOGIST

## 2018-12-20 PROCEDURE — 40000244 ZZH STATISTIC VISIT PULM REHAB: Performed by: CLINICAL EXERCISE PHYSIOLOGIST

## 2019-01-03 ENCOUNTER — HOSPITAL ENCOUNTER (OUTPATIENT)
Dept: CARDIAC REHAB | Facility: CLINIC | Age: 84
End: 2019-01-03
Attending: INTERNAL MEDICINE
Payer: MEDICARE

## 2019-01-03 PROCEDURE — G0424 PULMONARY REHAB W EXER: HCPCS | Performed by: CLINICAL EXERCISE PHYSIOLOGIST

## 2019-01-03 PROCEDURE — 40000244 ZZH STATISTIC VISIT PULM REHAB: Performed by: CLINICAL EXERCISE PHYSIOLOGIST

## 2019-01-17 ENCOUNTER — HOSPITAL ENCOUNTER (OUTPATIENT)
Dept: CARDIAC REHAB | Facility: CLINIC | Age: 84
End: 2019-01-17
Attending: INTERNAL MEDICINE
Payer: MEDICARE

## 2019-01-17 PROCEDURE — G0424 PULMONARY REHAB W EXER: HCPCS

## 2019-01-17 PROCEDURE — 40000244 ZZH STATISTIC VISIT PULM REHAB

## 2019-01-26 ENCOUNTER — APPOINTMENT (OUTPATIENT)
Dept: GENERAL RADIOLOGY | Facility: CLINIC | Age: 84
DRG: 190 | End: 2019-01-26
Payer: MEDICARE

## 2019-01-26 ENCOUNTER — HOSPITAL ENCOUNTER (INPATIENT)
Facility: CLINIC | Age: 84
LOS: 7 days | Discharge: NURSING, REHAB OR LONG-TERM CARE | DRG: 190 | End: 2019-02-03
Attending: EMERGENCY MEDICINE | Admitting: HOSPITALIST
Payer: MEDICARE

## 2019-01-26 DIAGNOSIS — J06.9 VIRAL URI WITH COUGH: ICD-10-CM

## 2019-01-26 DIAGNOSIS — E87.1 HYPONATREMIA: ICD-10-CM

## 2019-01-26 DIAGNOSIS — E11.8 TYPE 2 DIABETES MELLITUS WITH COMPLICATION, WITH LONG-TERM CURRENT USE OF INSULIN (H): ICD-10-CM

## 2019-01-26 DIAGNOSIS — I10 HYPERTENSION, UNSPECIFIED TYPE: ICD-10-CM

## 2019-01-26 DIAGNOSIS — F17.200 TOBACCO USE DISORDER: ICD-10-CM

## 2019-01-26 DIAGNOSIS — G47.00 INSOMNIA, UNSPECIFIED TYPE: Primary | ICD-10-CM

## 2019-01-26 DIAGNOSIS — J44.1 COPD EXACERBATION (H): ICD-10-CM

## 2019-01-26 DIAGNOSIS — Z79.4 TYPE 2 DIABETES MELLITUS WITH COMPLICATION, WITH LONG-TERM CURRENT USE OF INSULIN (H): ICD-10-CM

## 2019-01-26 DIAGNOSIS — R11.2 NAUSEA AND VOMITING, INTRACTABILITY OF VOMITING NOT SPECIFIED, UNSPECIFIED VOMITING TYPE: ICD-10-CM

## 2019-01-26 PROBLEM — R06.02 SOB (SHORTNESS OF BREATH): Status: ACTIVE | Noted: 2019-01-26

## 2019-01-26 LAB
ANION GAP SERPL CALCULATED.3IONS-SCNC: 12 MMOL/L (ref 3–14)
BASOPHILS # BLD AUTO: 0 10E9/L (ref 0–0.2)
BASOPHILS NFR BLD AUTO: 0.1 %
BUN SERPL-MCNC: 14 MG/DL (ref 7–30)
CALCIUM SERPL-MCNC: 9.3 MG/DL (ref 8.5–10.1)
CHLORIDE SERPL-SCNC: 91 MMOL/L (ref 94–109)
CO2 SERPL-SCNC: 25 MMOL/L (ref 20–32)
CREAT SERPL-MCNC: 0.61 MG/DL (ref 0.52–1.04)
DIFFERENTIAL METHOD BLD: ABNORMAL
EOSINOPHIL # BLD AUTO: 0 10E9/L (ref 0–0.7)
EOSINOPHIL NFR BLD AUTO: 0 %
ERYTHROCYTE [DISTWIDTH] IN BLOOD BY AUTOMATED COUNT: 14.2 % (ref 10–15)
GFR SERPL CREATININE-BSD FRML MDRD: 84 ML/MIN/{1.73_M2}
GLUCOSE BLDC GLUCOMTR-MCNC: 206 MG/DL (ref 70–99)
GLUCOSE BLDC GLUCOMTR-MCNC: 225 MG/DL (ref 70–99)
GLUCOSE SERPL-MCNC: 205 MG/DL (ref 70–99)
HBA1C MFR BLD: 7.6 % (ref 0–5.6)
HCT VFR BLD AUTO: 38.9 % (ref 35–47)
HGB BLD-MCNC: 13.5 G/DL (ref 11.7–15.7)
IMM GRANULOCYTES # BLD: 0.1 10E9/L (ref 0–0.4)
IMM GRANULOCYTES NFR BLD: 0.5 %
INTERPRETATION ECG - MUSE: NORMAL
LACTATE BLD-SCNC: 1.3 MMOL/L (ref 0.7–2)
LYMPHOCYTES # BLD AUTO: 0.6 10E9/L (ref 0.8–5.3)
LYMPHOCYTES NFR BLD AUTO: 3.9 %
MCH RBC QN AUTO: 29.1 PG (ref 26.5–33)
MCHC RBC AUTO-ENTMCNC: 34.7 G/DL (ref 31.5–36.5)
MCV RBC AUTO: 84 FL (ref 78–100)
MONOCYTES # BLD AUTO: 1 10E9/L (ref 0–1.3)
MONOCYTES NFR BLD AUTO: 6.8 %
NEUTROPHILS # BLD AUTO: 12.8 10E9/L (ref 1.6–8.3)
NEUTROPHILS NFR BLD AUTO: 88.7 %
NRBC # BLD AUTO: 0 10*3/UL
NRBC BLD AUTO-RTO: 0 /100
PLATELET # BLD AUTO: 228 10E9/L (ref 150–450)
POTASSIUM SERPL-SCNC: 3.1 MMOL/L (ref 3.4–5.3)
POTASSIUM SERPL-SCNC: 3.9 MMOL/L (ref 3.4–5.3)
PROCALCITONIN SERPL-MCNC: 0.23 NG/ML
RBC # BLD AUTO: 4.64 10E12/L (ref 3.8–5.2)
SODIUM SERPL-SCNC: 128 MMOL/L (ref 133–144)
TROPONIN I SERPL-MCNC: 0.02 UG/L (ref 0–0.04)
TROPONIN I SERPL-MCNC: 0.02 UG/L (ref 0–0.04)
WBC # BLD AUTO: 14.5 10E9/L (ref 4–11)

## 2019-01-26 PROCEDURE — 25000132 ZZH RX MED GY IP 250 OP 250 PS 637: Mod: GY | Performed by: HOSPITALIST

## 2019-01-26 PROCEDURE — 94640 AIRWAY INHALATION TREATMENT: CPT

## 2019-01-26 PROCEDURE — 93005 ELECTROCARDIOGRAM TRACING: CPT

## 2019-01-26 PROCEDURE — 84145 PROCALCITONIN (PCT): CPT | Performed by: HOSPITALIST

## 2019-01-26 PROCEDURE — 96374 THER/PROPH/DIAG INJ IV PUSH: CPT

## 2019-01-26 PROCEDURE — 71046 X-RAY EXAM CHEST 2 VIEWS: CPT

## 2019-01-26 PROCEDURE — 99219 ZZC INITIAL OBSERVATION CARE,LEVL II: CPT | Performed by: HOSPITALIST

## 2019-01-26 PROCEDURE — 96372 THER/PROPH/DIAG INJ SC/IM: CPT

## 2019-01-26 PROCEDURE — 83036 HEMOGLOBIN GLYCOSYLATED A1C: CPT | Performed by: EMERGENCY MEDICINE

## 2019-01-26 PROCEDURE — 36415 COLL VENOUS BLD VENIPUNCTURE: CPT | Performed by: HOSPITALIST

## 2019-01-26 PROCEDURE — 94640 AIRWAY INHALATION TREATMENT: CPT | Mod: 76

## 2019-01-26 PROCEDURE — 00000146 ZZHCL STATISTIC GLUCOSE BY METER IP

## 2019-01-26 PROCEDURE — 25000125 ZZHC RX 250: Performed by: EMERGENCY MEDICINE

## 2019-01-26 PROCEDURE — 96375 TX/PRO/DX INJ NEW DRUG ADDON: CPT

## 2019-01-26 PROCEDURE — 84132 ASSAY OF SERUM POTASSIUM: CPT | Performed by: HOSPITALIST

## 2019-01-26 PROCEDURE — 96361 HYDRATE IV INFUSION ADD-ON: CPT

## 2019-01-26 PROCEDURE — 25000125 ZZHC RX 250: Performed by: HOSPITALIST

## 2019-01-26 PROCEDURE — 83605 ASSAY OF LACTIC ACID: CPT | Performed by: HOSPITALIST

## 2019-01-26 PROCEDURE — 25000131 ZZH RX MED GY IP 250 OP 636 PS 637: Mod: GY | Performed by: HOSPITALIST

## 2019-01-26 PROCEDURE — 84484 ASSAY OF TROPONIN QUANT: CPT | Performed by: EMERGENCY MEDICINE

## 2019-01-26 PROCEDURE — 25000128 H RX IP 250 OP 636: Performed by: EMERGENCY MEDICINE

## 2019-01-26 PROCEDURE — A9270 NON-COVERED ITEM OR SERVICE: HCPCS | Mod: GY | Performed by: HOSPITALIST

## 2019-01-26 PROCEDURE — A9270 NON-COVERED ITEM OR SERVICE: HCPCS | Performed by: EMERGENCY MEDICINE

## 2019-01-26 PROCEDURE — 40000275 ZZH STATISTIC RCP TIME EA 10 MIN

## 2019-01-26 PROCEDURE — 99285 EMERGENCY DEPT VISIT HI MDM: CPT | Mod: 25

## 2019-01-26 PROCEDURE — 85025 COMPLETE CBC W/AUTO DIFF WBC: CPT | Performed by: EMERGENCY MEDICINE

## 2019-01-26 PROCEDURE — G0378 HOSPITAL OBSERVATION PER HR: HCPCS

## 2019-01-26 PROCEDURE — 80048 BASIC METABOLIC PNL TOTAL CA: CPT | Performed by: EMERGENCY MEDICINE

## 2019-01-26 PROCEDURE — 83036 HEMOGLOBIN GLYCOSYLATED A1C: CPT | Performed by: HOSPITALIST

## 2019-01-26 PROCEDURE — 25000128 H RX IP 250 OP 636: Performed by: HOSPITALIST

## 2019-01-26 RX ORDER — SODIUM CHLORIDE 9 MG/ML
1000 INJECTION, SOLUTION INTRAVENOUS CONTINUOUS
Status: DISCONTINUED | OUTPATIENT
Start: 2019-01-26 | End: 2019-01-27

## 2019-01-26 RX ORDER — LANOLIN ALCOHOL/MO/W.PET/CERES
3 CREAM (GRAM) TOPICAL
Status: DISCONTINUED | OUTPATIENT
Start: 2019-01-26 | End: 2019-02-03 | Stop reason: HOSPADM

## 2019-01-26 RX ORDER — ATORVASTATIN CALCIUM 40 MG/1
40 TABLET, FILM COATED ORAL DAILY
COMMUNITY
Start: 2018-09-29

## 2019-01-26 RX ORDER — LOSARTAN POTASSIUM 25 MG/1
25 TABLET ORAL
Status: DISCONTINUED | OUTPATIENT
Start: 2019-01-26 | End: 2019-01-29

## 2019-01-26 RX ORDER — DEXTROSE MONOHYDRATE 25 G/50ML
25-50 INJECTION, SOLUTION INTRAVENOUS
Status: DISCONTINUED | OUTPATIENT
Start: 2019-01-26 | End: 2019-02-03 | Stop reason: HOSPADM

## 2019-01-26 RX ORDER — SODIUM CHLORIDE 9 MG/ML
INJECTION, SOLUTION INTRAVENOUS CONTINUOUS
Status: DISCONTINUED | OUTPATIENT
Start: 2019-01-26 | End: 2019-01-27

## 2019-01-26 RX ORDER — NICOTINE POLACRILEX 4 MG
15-30 LOZENGE BUCCAL
Status: DISCONTINUED | OUTPATIENT
Start: 2019-01-26 | End: 2019-02-03 | Stop reason: HOSPADM

## 2019-01-26 RX ORDER — POTASSIUM CHLORIDE 1.5 G/1.58G
20-40 POWDER, FOR SOLUTION ORAL
Status: DISCONTINUED | OUTPATIENT
Start: 2019-01-26 | End: 2019-02-03 | Stop reason: HOSPADM

## 2019-01-26 RX ORDER — IPRATROPIUM BROMIDE AND ALBUTEROL SULFATE 2.5; .5 MG/3ML; MG/3ML
3 SOLUTION RESPIRATORY (INHALATION) EVERY 4 HOURS
Status: DISCONTINUED | OUTPATIENT
Start: 2019-01-26 | End: 2019-01-30

## 2019-01-26 RX ORDER — LEVOTHYROXINE SODIUM 100 UG/1
100 TABLET ORAL DAILY
COMMUNITY

## 2019-01-26 RX ORDER — ONDANSETRON 2 MG/ML
4 INJECTION INTRAMUSCULAR; INTRAVENOUS ONCE
Status: COMPLETED | OUTPATIENT
Start: 2019-01-26 | End: 2019-01-26

## 2019-01-26 RX ORDER — METHYLPREDNISOLONE SODIUM SUCCINATE 125 MG/2ML
125 INJECTION, POWDER, LYOPHILIZED, FOR SOLUTION INTRAMUSCULAR; INTRAVENOUS ONCE
Status: COMPLETED | OUTPATIENT
Start: 2019-01-26 | End: 2019-01-26

## 2019-01-26 RX ORDER — POTASSIUM CHLORIDE 29.8 MG/ML
20 INJECTION INTRAVENOUS
Status: DISCONTINUED | OUTPATIENT
Start: 2019-01-26 | End: 2019-02-03 | Stop reason: HOSPADM

## 2019-01-26 RX ORDER — ROFLUMILAST 500 UG/1
500 TABLET ORAL DAILY
COMMUNITY
Start: 2018-10-02

## 2019-01-26 RX ORDER — HYDRALAZINE HYDROCHLORIDE 20 MG/ML
10 INJECTION INTRAMUSCULAR; INTRAVENOUS EVERY 4 HOURS PRN
Status: DISCONTINUED | OUTPATIENT
Start: 2019-01-26 | End: 2019-01-28

## 2019-01-26 RX ORDER — CITALOPRAM HYDROBROMIDE 20 MG/1
20 TABLET ORAL DAILY
Status: DISCONTINUED | OUTPATIENT
Start: 2019-01-27 | End: 2019-02-03 | Stop reason: HOSPADM

## 2019-01-26 RX ORDER — ACETAMINOPHEN 325 MG/1
650 TABLET ORAL EVERY 4 HOURS PRN
Status: DISCONTINUED | OUTPATIENT
Start: 2019-01-26 | End: 2019-02-03 | Stop reason: HOSPADM

## 2019-01-26 RX ORDER — ONDANSETRON 4 MG/1
4 TABLET, ORALLY DISINTEGRATING ORAL EVERY 6 HOURS PRN
Status: DISCONTINUED | OUTPATIENT
Start: 2019-01-26 | End: 2019-02-03 | Stop reason: HOSPADM

## 2019-01-26 RX ORDER — POTASSIUM CHLORIDE 7.45 MG/ML
10 INJECTION INTRAVENOUS
Status: DISCONTINUED | OUTPATIENT
Start: 2019-01-26 | End: 2019-02-03 | Stop reason: HOSPADM

## 2019-01-26 RX ORDER — LANOLIN ALCOHOL/MO/W.PET/CERES
100 CREAM (GRAM) TOPICAL DAILY
COMMUNITY
Start: 2018-11-27

## 2019-01-26 RX ORDER — AMOXICILLIN 250 MG
1 CAPSULE ORAL 2 TIMES DAILY
Status: DISCONTINUED | OUTPATIENT
Start: 2019-01-26 | End: 2019-02-03 | Stop reason: HOSPADM

## 2019-01-26 RX ORDER — POTASSIUM CHLORIDE 1500 MG/1
20-40 TABLET, EXTENDED RELEASE ORAL
Status: DISCONTINUED | OUTPATIENT
Start: 2019-01-26 | End: 2019-02-03 | Stop reason: HOSPADM

## 2019-01-26 RX ORDER — GLIPIZIDE 5 MG/1
0.5 TABLET ORAL 2 TIMES DAILY
COMMUNITY
Start: 2018-09-29

## 2019-01-26 RX ORDER — IPRATROPIUM BROMIDE AND ALBUTEROL SULFATE 2.5; .5 MG/3ML; MG/3ML
3 SOLUTION RESPIRATORY (INHALATION) ONCE
Status: COMPLETED | OUTPATIENT
Start: 2019-01-26 | End: 2019-01-26

## 2019-01-26 RX ORDER — LEVOTHYROXINE SODIUM 100 UG/1
100 TABLET ORAL DAILY
Status: DISCONTINUED | OUTPATIENT
Start: 2019-01-27 | End: 2019-02-03 | Stop reason: HOSPADM

## 2019-01-26 RX ORDER — METOPROLOL SUCCINATE 50 MG/1
1 TABLET, EXTENDED RELEASE ORAL DAILY
Status: ON HOLD | COMMUNITY
Start: 2019-01-11 | End: 2019-02-03

## 2019-01-26 RX ORDER — AMOXICILLIN 250 MG
2 CAPSULE ORAL 2 TIMES DAILY
Status: DISCONTINUED | OUTPATIENT
Start: 2019-01-26 | End: 2019-02-03 | Stop reason: HOSPADM

## 2019-01-26 RX ORDER — OXYMETAZOLINE HYDROCHLORIDE 0.05 G/100ML
2 SPRAY NASAL ONCE
Status: COMPLETED | OUTPATIENT
Start: 2019-01-26 | End: 2019-01-26

## 2019-01-26 RX ORDER — CLONIDINE HYDROCHLORIDE 0.1 MG/1
0.1 TABLET ORAL AT BEDTIME
Status: DISCONTINUED | OUTPATIENT
Start: 2019-01-26 | End: 2019-02-03 | Stop reason: HOSPADM

## 2019-01-26 RX ORDER — HYDRALAZINE HYDROCHLORIDE 20 MG/ML
10 INJECTION INTRAMUSCULAR; INTRAVENOUS ONCE
Status: COMPLETED | OUTPATIENT
Start: 2019-01-26 | End: 2019-01-26

## 2019-01-26 RX ORDER — METOPROLOL SUCCINATE 50 MG/1
50 TABLET, EXTENDED RELEASE ORAL
Status: DISCONTINUED | OUTPATIENT
Start: 2019-01-26 | End: 2019-01-31

## 2019-01-26 RX ORDER — ALBUTEROL SULFATE 0.83 MG/ML
2.5 SOLUTION RESPIRATORY (INHALATION) EVERY 4 HOURS PRN
Status: DISCONTINUED | OUTPATIENT
Start: 2019-01-26 | End: 2019-02-03 | Stop reason: HOSPADM

## 2019-01-26 RX ORDER — LOSARTAN POTASSIUM 50 MG/1
50 TABLET ORAL
Status: DISCONTINUED | OUTPATIENT
Start: 2019-01-27 | End: 2019-01-29

## 2019-01-26 RX ORDER — POTASSIUM CL/LIDO/0.9 % NACL 10MEQ/0.1L
10 INTRAVENOUS SOLUTION, PIGGYBACK (ML) INTRAVENOUS
Status: DISCONTINUED | OUTPATIENT
Start: 2019-01-26 | End: 2019-02-03 | Stop reason: HOSPADM

## 2019-01-26 RX ORDER — ACETAMINOPHEN 650 MG/1
650 SUPPOSITORY RECTAL EVERY 4 HOURS PRN
Status: DISCONTINUED | OUTPATIENT
Start: 2019-01-26 | End: 2019-02-03 | Stop reason: HOSPADM

## 2019-01-26 RX ORDER — LOSARTAN POTASSIUM 50 MG/1
TABLET ORAL
Status: ON HOLD | COMMUNITY
Start: 2018-12-17 | End: 2019-02-03

## 2019-01-26 RX ORDER — ONDANSETRON 2 MG/ML
4 INJECTION INTRAMUSCULAR; INTRAVENOUS EVERY 6 HOURS PRN
Status: DISCONTINUED | OUTPATIENT
Start: 2019-01-26 | End: 2019-02-03 | Stop reason: HOSPADM

## 2019-01-26 RX ORDER — METHYLPREDNISOLONE SODIUM SUCCINATE 125 MG/2ML
60 INJECTION, POWDER, LYOPHILIZED, FOR SOLUTION INTRAMUSCULAR; INTRAVENOUS EVERY 12 HOURS
Status: DISCONTINUED | OUTPATIENT
Start: 2019-01-26 | End: 2019-01-29

## 2019-01-26 RX ORDER — PYRIDOXINE HCL (VITAMIN B6) 25 MG
0.5 TABLET ORAL DAILY
COMMUNITY
Start: 2018-10-17

## 2019-01-26 RX ORDER — NALOXONE HYDROCHLORIDE 0.4 MG/ML
.1-.4 INJECTION, SOLUTION INTRAMUSCULAR; INTRAVENOUS; SUBCUTANEOUS
Status: DISCONTINUED | OUTPATIENT
Start: 2019-01-26 | End: 2019-02-03 | Stop reason: HOSPADM

## 2019-01-26 RX ADMIN — SODIUM CHLORIDE 1000 ML: 9 INJECTION, SOLUTION INTRAVENOUS at 12:52

## 2019-01-26 RX ADMIN — METOPROLOL SUCCINATE 50 MG: 50 TABLET, EXTENDED RELEASE ORAL at 19:06

## 2019-01-26 RX ADMIN — METHYLPREDNISOLONE SODIUM SUCCINATE 62.5 MG: 125 INJECTION, POWDER, FOR SOLUTION INTRAMUSCULAR; INTRAVENOUS at 21:07

## 2019-01-26 RX ADMIN — SODIUM CHLORIDE 1000 ML: 9 INJECTION, SOLUTION INTRAVENOUS at 08:17

## 2019-01-26 RX ADMIN — CLONIDINE HYDROCHLORIDE 0.1 MG: 0.1 TABLET ORAL at 22:10

## 2019-01-26 RX ADMIN — GLIPIZIDE 2.5 MG: 5 TABLET ORAL at 18:56

## 2019-01-26 RX ADMIN — SODIUM CHLORIDE: 9 INJECTION, SOLUTION INTRAVENOUS at 17:04

## 2019-01-26 RX ADMIN — POTASSIUM CHLORIDE 20 MEQ: 1500 TABLET, EXTENDED RELEASE ORAL at 19:06

## 2019-01-26 RX ADMIN — LOSARTAN POTASSIUM 25 MG: 25 TABLET ORAL at 21:13

## 2019-01-26 RX ADMIN — MELATONIN 3 MG: 3 TAB ORAL at 22:12

## 2019-01-26 RX ADMIN — SODIUM CHLORIDE 1000 ML: 9 INJECTION, SOLUTION INTRAVENOUS at 23:52

## 2019-01-26 RX ADMIN — IPRATROPIUM BROMIDE AND ALBUTEROL SULFATE 3 ML: .5; 2.5 SOLUTION RESPIRATORY (INHALATION) at 11:18

## 2019-01-26 RX ADMIN — SODIUM CHLORIDE: 9 INJECTION, SOLUTION INTRAVENOUS at 17:20

## 2019-01-26 RX ADMIN — METHYLPREDNISOLONE SODIUM SUCCINATE 125 MG: 125 INJECTION, POWDER, FOR SOLUTION INTRAMUSCULAR; INTRAVENOUS at 12:54

## 2019-01-26 RX ADMIN — IPRATROPIUM BROMIDE AND ALBUTEROL SULFATE 3 ML: .5; 2.5 SOLUTION RESPIRATORY (INHALATION) at 10:13

## 2019-01-26 RX ADMIN — OXYMETAZOLINE HYDROCHLORIDE 2 SPRAY: 5 SPRAY NASAL at 08:21

## 2019-01-26 RX ADMIN — POTASSIUM CHLORIDE 40 MEQ: 1500 TABLET, EXTENDED RELEASE ORAL at 16:49

## 2019-01-26 RX ADMIN — ONDANSETRON 4 MG: 2 INJECTION INTRAMUSCULAR; INTRAVENOUS at 09:23

## 2019-01-26 RX ADMIN — HYDRALAZINE HYDROCHLORIDE 10 MG: 20 INJECTION INTRAMUSCULAR; INTRAVENOUS at 12:55

## 2019-01-26 RX ADMIN — HYDRALAZINE HYDROCHLORIDE 10 MG: 20 INJECTION INTRAMUSCULAR; INTRAVENOUS at 17:04

## 2019-01-26 RX ADMIN — IPRATROPIUM BROMIDE AND ALBUTEROL SULFATE 3 ML: .5; 3 SOLUTION RESPIRATORY (INHALATION) at 16:55

## 2019-01-26 RX ADMIN — IPRATROPIUM BROMIDE AND ALBUTEROL SULFATE 3 ML: .5; 3 SOLUTION RESPIRATORY (INHALATION) at 19:33

## 2019-01-26 RX ADMIN — INSULIN GLARGINE 15 UNITS: 100 INJECTION, SOLUTION SUBCUTANEOUS at 22:10

## 2019-01-26 RX ADMIN — INSULIN ASPART 1 UNITS: 100 INJECTION, SOLUTION INTRAVENOUS; SUBCUTANEOUS at 22:15

## 2019-01-26 ASSESSMENT — ENCOUNTER SYMPTOMS
FEVER: 0
SINUS PAIN: 0
SHORTNESS OF BREATH: 1
CHILLS: 0
COUGH: 1
ARTHRALGIAS: 0
SLEEP DISTURBANCE: 1
FATIGUE: 1
MYALGIAS: 0
ABDOMINAL PAIN: 0
VOMITING: 1

## 2019-01-26 ASSESSMENT — MIFFLIN-ST. JEOR
SCORE: 1090.93
SCORE: 1104.99

## 2019-01-26 NOTE — PROVIDER NOTIFICATION
MD Notification Dr. Alas    Notified Person: MD    Notified Person Name:    Notification Date/Time:1/26/19    Notification Interactiontele phone call    Purpose of Notification: elevated. Blood pressure,  Low potassium,  No insulin orders.    Orders Received: awaiting call bacl.    Comments:

## 2019-01-26 NOTE — ED PROVIDER NOTES
History     Chief Complaint:  Shortness of Breath     HPI   Aisha Fitzgerald is a 83 year old female with a history of COPD, type II diabetes, and hypertension who presents to the emergency department today for evaluation of shortness of breath. Patient reports that she has had sinus congestion for the last couple of days with associated trouble sleeping even with trazodone, mild nonproductive cough, and vomiting yesterday after eating. She is unsure what is causing her sleep disturbance. She has been using her nebs and inhalers, is on 1L of oxygen at night, and says it is hard to inhale but attributes this to her sinus congestion. She denies fever, chills, body aches, chest pain or pressure, abdominal pain, or facial pain when bending forward.     Allergies:  Hydrochlorothiazide   Contrast dye   Levofloxacin   Thiazide-type diuretics     Medications:    Albuterol   Norvasc  Atorvastatin   Citalopram   Clonidine   Glipizide   Levothyroxine   Losartan   Toprol   Spiriva     Past Medical History:    Melanoma    COPD (chronic obstructive pulmonary disease)    Type II diabetes    Hypertension   Hypothyroidism   Depression     Past Surgical History:    Bimalleolar reduction, left ankle  Cataract removal, bilateral   Strabismus surgery, left   Biopsy of breast, bilateral     Family History:    Father: Type II diabetes   Mother: Ovarian cancer  Brother: Cataracts, type II diabetes, hypertension, melanoma, gout   No family history of eye disorders.     Social History:  Smoking Status: Current Some Day Smoker   Packs/day: 0.50    Years: 50.00   Types: Cigarettes   Smokeless Tobacco: Never Used  Alcohol Use: Negative  Drug Use: Negative    Marital Status: Single      Review of Systems   Constitutional: Positive for fatigue. Negative for chills and fever.   HENT: Positive for congestion. Negative for sinus pain.    Respiratory: Positive for cough (mild, nonproductive) and shortness of breath (hard to inhale).   "  Cardiovascular: Negative for chest pain.   Gastrointestinal: Positive for vomiting. Negative for abdominal pain.   Musculoskeletal: Negative for arthralgias and myalgias.   Psychiatric/Behavioral: Positive for sleep disturbance.   All other systems reviewed and are negative.    Physical Exam     Patient Vitals for the past 24 hrs:   BP Temp Temp src Pulse Heart Rate Resp SpO2 Height Weight   01/26/19 1320 172/65 -- -- 107 -- -- 96 % -- --   01/26/19 1220 (!) 202/70 -- -- 103 -- -- 96 % -- --   01/26/19 1200 (!) 208/76 -- -- 105 -- -- 100 % -- --   01/26/19 1140 127/86 -- -- 113 -- -- -- -- --   01/26/19 1016 -- -- -- -- -- -- 96 % -- --   01/26/19 1000 100/50 -- -- 85 -- -- 94 % -- --   01/26/19 0940 148/64 -- -- 91 -- -- 94 % -- --   01/26/19 0928 -- -- -- -- -- -- 94 % -- --   01/26/19 0925 -- -- -- -- -- -- (!) 88 % -- --   01/26/19 0924 (!) 146/97 -- -- 106 -- -- (!) 89 % -- --   01/26/19 0756 145/65 98.5  F (36.9  C) Oral -- 107 24 95 % 1.6 m (5' 3\") 67.6 kg (149 lb)     Physical Exam  GENERAL: well developed, pleasant  HEAD: atraumatic  EYES: pupils reactive, extraocular muscles intact, conjunctivae normal  ENT:  mucus membranes moist, no facial pain over maxillary sinus   NECK:  trachea midline, normal range of motion, no cervical lymphadenopathy   RESPIRATORY: no tachypnea, mildly diminished lung sounds   CVS: normal S1/S2, no murmurs, intact distal pulses  ABDOMEN: soft, nontender, nondistention  MUSCULOSKELETAL: no deformities  SKIN: warm and dry, no acute rashes or ulceration  NEURO: GCS 15, cranial nerves intact, alert and oriented x3  PSYCH: Appears mildly anxious     Emergency Department Course     ECG:  ECG taken at 0843, ECG read at 0855  Normal sinus rhythm  Left axis deviation   Left ventricular hypertrophy with repolarization abnormality   Anteroseptal infarct, age undetermined   Abnormal ECG  Rate 99 bpm. CO interval 186 ms. QRS duration 102 ms. QT/QTc 362/464 ms. P-R-T axes 93 -37 " 78.    Imaging:  Radiology findings were communicated with the patient who voiced understanding of the findings.    XR Chest 2 Views  Again seen is mild atelectasis of the right middle lobe. This is best demonstrated on the lateral view and is likely unchanged. There is hyperexpansion of the lungs suggesting obstructive pulmonary disease. There is slight blunting of the costophrenic angles which may also be due to atelectasis or chronic scarring. No other abnormality is noted. I see no definite change since the previous examination.   LILI SERRA MD   Reading per radiology     Laboratory:  Laboratory findings were communicated with the patient who voiced understanding of the findings.    CBC: WBC 14.5(H), HGB 13.5,   BMP: (L), Potassium 3.1(L), Chloride 91(L), Glucose 205(H) o/w WNL (Creatinine 0.61)  Troponin (Collected 0815): 0.021   Troponin (Collected 1133): 0.021      Interventions:  0812 NS 1,000 mL IV  0821 Afrin 2 spray Nasal   0921 Zofran 4 mg IV  1013 Duoneb 3 mL Neb  1118 Duoneb 3 mL Neb  1252 NS 1,000 mL IV   1254 solu-medrol 125 mg IV   1255 Apresoline 10 mg IV    Emergency Department Course:    0800 Nursing notes and vitals reviewed.    0803 I performed an exam of the patient as documented above.     0815 IV was inserted and blood was drawn for laboratory testing, results above.    0824 The patient was sent for an XR while in the emergency department, results above.     0843 EKG taken as noted above.     1000 Rechecked and updated.      1133 Troponin collected as noted above.     1240 Rechecked and updated.      1253 I spoke with Dr. Alas of the hospitalist service from [facility]  regarding patient's presentation, findings, and plan of care.    1330 I personally reviewed the lab and imaging results with the patient and answered all related questions prior to admit.    Impression & Plan      Medical Decision Making:  Aisha Fitzgerald is a 83 year old female who presents  to the emergency department today for evaluation of sinus congestion, nausea and vomiting and shortness of breath.  Patient looks to have a viral URI with sinus and difficulty taking air in through her nose.  Patient was given DuoNeb x2 and noted that her pulse ox dropped into the upper 80s.  Patient is on oxygen just at night.  Patient has some mild pursed lips.  Chest x-ray is negative.  Patient given nausea meds but notes that she still has trouble keeping p.o. intake down.  Patient did take her blood pressure meds this morning but is noted to be hypertensive.  She has had recent medication changes within the last 2 weeks with changing her clonidine to once a day and increasing her losartan.  Sodium was also slightly low.  Patient given IV fluids and IV hydralazine given the persistent hypertension.  Given the shortness of breath and history of diabetes troponin was ordered was abnormal but repeated and still unchanged doubt this to be acute coronary syndrome.  Given her ongoing nausea inability to keep p.o.'s down shortness of breath and viral illness patient be admitted for observation.  Spoke with the hospitalist regarding admission.    Diagnosis:    ICD-10-CM    1. Viral URI with cough J06.9     B97.89    2. COPD exacerbation (H) J44.1    3. Nausea and vomiting, intractability of vomiting not specified, unspecified vomiting type R11.2    4. Hyponatremia E87.1    5. Hypertension, unspecified type I10      Disposition:   The patient is admitted into the care of Dr. Alas.    Scribe Disclosure:  Jeanie COBURN, am serving as a scribe at 8:03 AM on 1/26/2019 to document services personally performed by Jose Oliva MD based on my observations and the provider's statements to me.       EMERGENCY DEPARTMENT       Jose Oliva MD  01/26/19 [id number]

## 2019-01-26 NOTE — PLAN OF CARE
vital signs normal or at patient baseline - not met  -tolerating oral intake to maintain hydration - not met  -dyspnea improved and O2 sats greater than 88% on room air or prior home oxygen levels - not met  -returns to baseline functional status - not met  -safe disposition plan has been identified  - not met

## 2019-01-26 NOTE — ED NOTES
"[facility]  ED Nurse Handoff Report    ED Chief complaint: Shortness of Breath (starting yesterday. vomited yesterday)      ED Diagnosis:   Final diagnoses:   Viral URI with cough   COPD exacerbation (H)   Nausea and vomiting, intractability of vomiting not specified, unspecified vomiting type   Hyponatremia   Hypertension, unspecified type       Code Status: Full Code    Allergies:   Allergies   Allergen Reactions     Hctz [Hydrochlorothiazide] Unknown     Contrast Dye Rash       Activity level - Baseline/Home:  Independent    Activity Level - Current:   Stand with Assist     Needed?: No    Isolation: No  Infection: Not Applicable  Bariatric?: No    Vital Signs:   Vitals:    01/26/19 1016 01/26/19 1140 01/26/19 1200 01/26/19 1220   BP:  127/86 (!) 208/76 (!) 202/70   Pulse:  113 105 103   Resp:       Temp:       TempSrc:       SpO2: 96%  100% 96%   Weight:       Height:           Cardiac Rhythm: ,        Pain level: 0-10 Pain Scale: 0    Is this patient confused?: No   Does this patient have a guardian?  No         If yes, is there guardianship documents in the Epic \"Code/ACP\" activity?  N/A         Guardian Notified?  N/A  [place] - Suicide Severity Rating Scale Completed?  Yes  If yes, what color did the patient score?  White    Patient Report: Initial Complaint: Patient reports that she has had sinus congestion for the last couple of days with associated trouble sleeping even with trazodone, mild nonproductive cough, and vomiting yesterday after eating.  Focused Assessment: Pt continues to c/o nausea-zofran given.  Pt on 3L nc for o2 sats 88-90.  Pt is on home o2 but only at night-1L. Pt lives alone in a house and is independent. Pt denies fevers.   Tests Performed:   Results for orders placed or performed during the hospital encounter of 01/26/19   XR Chest 2 Views    Narrative    CHEST TWO VIEWS  January 26, 2019 8:38 AM    HISTORY: Shortness of breath.    COMPARISON: 11/25/2018.   "    Impression    IMPRESSION: Again seen is mild atelectasis of the right middle lobe.  This is best demonstrated on the lateral view and is likely unchanged.  There is hyperexpansion of the lungs suggesting obstructive pulmonary  disease. There is slight blunting of the costophrenic angles which may  also be due to atelectasis or chronic scarring. No other abnormality  is noted. I see no definite change since the previous examination.     LILI SERRA MD   CBC with platelets differential   Result Value Ref Range    WBC 14.5 (H) 4.0 - 11.0 10e9/L    RBC Count 4.64 3.8 - 5.2 10e12/L    Hemoglobin 13.5 11.7 - 15.7 g/dL    Hematocrit 38.9 35.0 - 47.0 %    MCV 84 78 - 100 fl    MCH 29.1 26.5 - 33.0 pg    MCHC 34.7 31.5 - 36.5 g/dL    RDW 14.2 10.0 - 15.0 %    Platelet Count 228 150 - 450 10e9/L    Diff Method Automated Method     % Neutrophils 88.7 %    % Lymphocytes 3.9 %    % Monocytes 6.8 %    % Eosinophils 0.0 %    % Basophils 0.1 %    % Immature Granulocytes 0.5 %    Nucleated RBCs 0 0 /100    Absolute Neutrophil 12.8 (H) 1.6 - 8.3 10e9/L    Absolute Lymphocytes 0.6 (L) 0.8 - 5.3 10e9/L    Absolute Monocytes 1.0 0.0 - 1.3 10e9/L    Absolute Eosinophils 0.0 0.0 - 0.7 10e9/L    Absolute Basophils 0.0 0.0 - 0.2 10e9/L    Abs Immature Granulocytes 0.1 0 - 0.4 10e9/L    Absolute Nucleated RBC 0.0    Basic metabolic panel   Result Value Ref Range    Sodium 128 (L) 133 - 144 mmol/L    Potassium 3.1 (L) 3.4 - 5.3 mmol/L    Chloride 91 (L) 94 - 109 mmol/L    Carbon Dioxide 25 20 - 32 mmol/L    Anion Gap 12 3 - 14 mmol/L    Glucose 205 (H) 70 - 99 mg/dL    Urea Nitrogen 14 7 - 30 mg/dL    Creatinine 0.61 0.52 - 1.04 mg/dL    GFR Estimate 84 >60 mL/min/[1.73_m2]    GFR Estimate If Black >90 >60 mL/min/[1.73_m2]    Calcium 9.3 8.5 - 10.1 mg/dL   Troponin I   Result Value Ref Range    Troponin I ES 0.021 0.000 - 0.045 ug/L   Troponin I   Result Value Ref Range    Troponin I ES 0.021 0.000 - 0.045 ug/L   EKG 12-lead,  tracing only   Result Value Ref Range    Interpretation ECG Click View Image link to view waveform and result      Abnormal Results:   Treatments provided: IVF, nebs, hydralazine, and solu-medrol given.     Family Comments:     OBS brochure/video discussed/provided to patient/family: yes              Name of person given brochure if not patient: Pt              Relationship to patient:     ED Medications:   Medications   0.9% sodium chloride BOLUS (0 mLs Intravenous Stopped 1/26/19 1100)     Followed by   sodium chloride 0.9% infusion (not administered)   hydrALAZINE (APRESOLINE) injection 10 mg (not administered)   methylPREDNISolone sodium succinate (solu-MEDROL) injection 125 mg (not administered)   oxymetazoline (AFRIN) 0.05 % spray 2 spray (2 sprays Nasal Given 1/26/19 0821)   ondansetron (ZOFRAN) injection 4 mg (4 mg Intravenous Given 1/26/19 0923)   ipratropium - albuterol 0.5 mg/2.5 mg/3 mL (DUONEB) neb solution 3 mL (3 mLs Nebulization Given 1/26/19 1013)   ipratropium - albuterol 0.5 mg/2.5 mg/3 mL (DUONEB) neb solution 3 mL (3 mLs Nebulization Given 1/26/19 1118)       Drips infusing?:  No    For the majority of the shift this patient was Green.   Interventions performed were   .    Severe Sepsis OR Septic Shock Diagnosis Present: No    To be done/followed up on inpatient unit:      ED NURSE PHONE NUMBER: [phone]

## 2019-01-26 NOTE — PHARMACY-ADMISSION MEDICATION HISTORY
Admission medication history interview status for the 1/26/2019  admission is complete. See EPIC admission navigator for prior to admission medications     Medication history source reliability:Good    Actions taken by pharmacist (provider contacted, etc):Spoke with patient, reviewed care everywhere and surescripts.    Additional medication history information not noted on PTA med list :None    Medication reconciliation/reorder completed by provider prior to medication history? No    Time spent in this activity: 30 min    Prior to Admission medications    Medication Sig Last Dose Taking? Auth Provider   albuterol (PROVENTIL HFA: VENTOLIN HFA) 108 (90 BASE) MCG/ACT inhaler Inhale 2-4 puffs into the lungs every 4 hours as needed   at PRN Yes Unknown, Entered By History   atorvastatin (LIPITOR) 40 MG tablet Take 40 mg by mouth daily 1/25/2019 at PM Yes Unknown, Entered By History   calcium carb 1250 mg, 500 mg [place],/vitamin D 200 units (OSCAL WITH D) 500-200 MG-UNIT per tablet Take 0.5 tablets by mouth daily 1/25/2019 at PM Yes Unknown, Entered By History   Cholecalciferol (VITAMIN D3 PO) Take 1,000 Units by mouth daily. 1/25/2019 at PM Yes Reported, Patient   CITALOPRAM HYDROBROMIDE PO Take 20 mg by mouth daily. 1/26/2019 at AM Yes Reported, Patient   CLONIDINE HCL PO Take 0.1 mg by mouth At Bedtime  1/25/2019 at HS Yes Reported, Patient   COENZYME Q-10 PO Take 100 mg by mouth daily. 1/25/2019 at PM Yes Unknown, Entered By History   DALIRESP 500 MCG TABS tablet Take 500 mcg by mouth daily  1/25/2019 at PM Yes Unknown, Entered By History   fluticasone-salmeterol (ADVAIR) 250-50 MCG/DOSE diskus inhaler Inhale 1 puff into the lungs 2 times daily 1/26/2019 at AM Yes Unknown, Entered By History   glipiZIDE (GLUCOTROL) 5 MG tablet Take 0.5 tablets by mouth 2 times daily 1/25/2019 at PM Yes Unknown, Entered By History   insulin glargine (LANTUS) 100 UNIT/ML PEN Inject 18 Units Subcutaneous At Bedtime  1/25/2019 at PM Yes  Unknown, Entered By History   levothyroxine (SYNTHROID/LEVOTHROID) 100 MCG tablet Take 100 mcg by mouth daily 1/26/2019 at AM Yes Unknown, Entered By History   losartan (COZAAR) 50 MG tablet Take 50mg in the morning and 25mg in the evening. 1/26/2019 at AM Yes Unknown, Entered By History   metoprolol succinate ER (TOPROL-XL) 50 MG 24 hr tablet Take 1 tablet by mouth daily 1/25/2019 at PM Yes Unknown, Entered By History   OMEPRAZOLE PO Take 40 mg by mouth every morning 1/26/2019 at AM Yes Unknown, Entered By History   tiotropium (SPIRIVA) 18 MCG inhalation capsule Inhale 18 mcg into the lungs daily 1/26/2019 at AM Yes Unknown, Entered By History   vitamin B1 (THIAMINE) 100 MG tablet Take 100 mg by mouth daily 1/25/2019 at Unknown time Yes Unknown, Entered By History   vitamin B6 (PYRIDOXINE) 25 MG tablet Take 0.5 tablets by mouth daily 1/25/2019 at Unknown time Yes Unknown, Entered By History     Radha Carlson, PharmD IV Student

## 2019-01-26 NOTE — PLAN OF CARE
Alert and oriented,  vss with elevated blood pressure temp and pulse, pursed lip breathing, c/o of sinus congestion, mask oxygen changed, lungs diminished with rhonchi. Oriented to obs.  Pneumatics applied,I have attempted to contact this patient by phone with the following results:

## 2019-01-27 PROBLEM — J44.1 COPD EXACERBATION (H): Status: ACTIVE | Noted: 2019-01-27

## 2019-01-27 LAB
ANION GAP SERPL CALCULATED.3IONS-SCNC: 7 MMOL/L (ref 3–14)
BUN SERPL-MCNC: 17 MG/DL (ref 7–30)
CALCIUM SERPL-MCNC: 9 MG/DL (ref 8.5–10.1)
CHLORIDE SERPL-SCNC: 101 MMOL/L (ref 94–109)
CO2 SERPL-SCNC: 25 MMOL/L (ref 20–32)
CREAT SERPL-MCNC: 0.54 MG/DL (ref 0.52–1.04)
ERYTHROCYTE [DISTWIDTH] IN BLOOD BY AUTOMATED COUNT: 14.5 % (ref 10–15)
GFR SERPL CREATININE-BSD FRML MDRD: 88 ML/MIN/{1.73_M2}
GLUCOSE BLDC GLUCOMTR-MCNC: 174 MG/DL (ref 70–99)
GLUCOSE BLDC GLUCOMTR-MCNC: 188 MG/DL (ref 70–99)
GLUCOSE BLDC GLUCOMTR-MCNC: 193 MG/DL (ref 70–99)
GLUCOSE BLDC GLUCOMTR-MCNC: 205 MG/DL (ref 70–99)
GLUCOSE BLDC GLUCOMTR-MCNC: 252 MG/DL (ref 70–99)
GLUCOSE SERPL-MCNC: 185 MG/DL (ref 70–99)
HCT VFR BLD AUTO: 37 % (ref 35–47)
HGB BLD-MCNC: 12.3 G/DL (ref 11.7–15.7)
LACTATE BLD-SCNC: 0.9 MMOL/L (ref 0.7–2)
MCH RBC QN AUTO: 28.3 PG (ref 26.5–33)
MCHC RBC AUTO-ENTMCNC: 33.2 G/DL (ref 31.5–36.5)
MCV RBC AUTO: 85 FL (ref 78–100)
PLATELET # BLD AUTO: 228 10E9/L (ref 150–450)
POTASSIUM SERPL-SCNC: 3.9 MMOL/L (ref 3.4–5.3)
RBC # BLD AUTO: 4.34 10E12/L (ref 3.8–5.2)
SODIUM SERPL-SCNC: 133 MMOL/L (ref 133–144)
WBC # BLD AUTO: 16.1 10E9/L (ref 4–11)

## 2019-01-27 PROCEDURE — 96366 THER/PROPH/DIAG IV INF ADDON: CPT

## 2019-01-27 PROCEDURE — 94640 AIRWAY INHALATION TREATMENT: CPT | Mod: 76

## 2019-01-27 PROCEDURE — 40000275 ZZH STATISTIC RCP TIME EA 10 MIN

## 2019-01-27 PROCEDURE — 40000141 ZZH STATISTIC PERIPHERAL IV START W/O US GUIDANCE

## 2019-01-27 PROCEDURE — 25000128 H RX IP 250 OP 636: Performed by: EMERGENCY MEDICINE

## 2019-01-27 PROCEDURE — 96376 TX/PRO/DX INJ SAME DRUG ADON: CPT

## 2019-01-27 PROCEDURE — 83605 ASSAY OF LACTIC ACID: CPT | Performed by: INTERNAL MEDICINE

## 2019-01-27 PROCEDURE — 25000132 ZZH RX MED GY IP 250 OP 250 PS 637: Mod: GY | Performed by: PHYSICIAN ASSISTANT

## 2019-01-27 PROCEDURE — A9270 NON-COVERED ITEM OR SERVICE: HCPCS | Mod: GY | Performed by: HOSPITALIST

## 2019-01-27 PROCEDURE — 12000000 ZZH R&B MED SURG/OB

## 2019-01-27 PROCEDURE — 94799 UNLISTED PULMONARY SVC/PX: CPT

## 2019-01-27 PROCEDURE — 25000132 ZZH RX MED GY IP 250 OP 250 PS 637: Mod: GY | Performed by: HOSPITALIST

## 2019-01-27 PROCEDURE — 36415 COLL VENOUS BLD VENIPUNCTURE: CPT | Performed by: HOSPITALIST

## 2019-01-27 PROCEDURE — 94640 AIRWAY INHALATION TREATMENT: CPT

## 2019-01-27 PROCEDURE — G0378 HOSPITAL OBSERVATION PER HR: HCPCS

## 2019-01-27 PROCEDURE — 80048 BASIC METABOLIC PNL TOTAL CA: CPT | Performed by: HOSPITALIST

## 2019-01-27 PROCEDURE — 25000131 ZZH RX MED GY IP 250 OP 636 PS 637: Mod: GY | Performed by: PHYSICIAN ASSISTANT

## 2019-01-27 PROCEDURE — 99233 SBSQ HOSP IP/OBS HIGH 50: CPT | Performed by: PHYSICIAN ASSISTANT

## 2019-01-27 PROCEDURE — 96361 HYDRATE IV INFUSION ADD-ON: CPT

## 2019-01-27 PROCEDURE — 25000125 ZZHC RX 250: Performed by: HOSPITALIST

## 2019-01-27 PROCEDURE — 25000128 H RX IP 250 OP 636: Performed by: HOSPITALIST

## 2019-01-27 PROCEDURE — 25000128 H RX IP 250 OP 636: Performed by: INTERNAL MEDICINE

## 2019-01-27 PROCEDURE — 96365 THER/PROPH/DIAG IV INF INIT: CPT

## 2019-01-27 PROCEDURE — A9270 NON-COVERED ITEM OR SERVICE: HCPCS | Mod: GY | Performed by: PHYSICIAN ASSISTANT

## 2019-01-27 PROCEDURE — 85027 COMPLETE CBC AUTOMATED: CPT | Performed by: HOSPITALIST

## 2019-01-27 PROCEDURE — 36415 COLL VENOUS BLD VENIPUNCTURE: CPT | Performed by: INTERNAL MEDICINE

## 2019-01-27 PROCEDURE — 00000146 ZZHCL STATISTIC GLUCOSE BY METER IP

## 2019-01-27 PROCEDURE — 25000128 H RX IP 250 OP 636: Performed by: PHYSICIAN ASSISTANT

## 2019-01-27 RX ORDER — LORAZEPAM 2 MG/ML
.5-1 INJECTION INTRAMUSCULAR EVERY 4 HOURS PRN
Status: DISCONTINUED | OUTPATIENT
Start: 2019-01-27 | End: 2019-02-03 | Stop reason: HOSPADM

## 2019-01-27 RX ORDER — ALBUTEROL SULFATE 90 UG/1
2-4 AEROSOL, METERED RESPIRATORY (INHALATION) EVERY 4 HOURS PRN
Status: DISCONTINUED | OUTPATIENT
Start: 2019-01-27 | End: 2019-02-03 | Stop reason: HOSPADM

## 2019-01-27 RX ADMIN — GLIPIZIDE 2.5 MG: 5 TABLET ORAL at 07:52

## 2019-01-27 RX ADMIN — IPRATROPIUM BROMIDE AND ALBUTEROL SULFATE 3 ML: .5; 3 SOLUTION RESPIRATORY (INHALATION) at 20:31

## 2019-01-27 RX ADMIN — SODIUM CHLORIDE 1000 ML: 9 INJECTION, SOLUTION INTRAVENOUS at 06:29

## 2019-01-27 RX ADMIN — METOPROLOL SUCCINATE 50 MG: 50 TABLET, EXTENDED RELEASE ORAL at 19:08

## 2019-01-27 RX ADMIN — LEVOTHYROXINE SODIUM 100 MCG: 100 TABLET ORAL at 07:51

## 2019-01-27 RX ADMIN — INSULIN GLARGINE 18 UNITS: 100 INJECTION, SOLUTION SUBCUTANEOUS at 22:06

## 2019-01-27 RX ADMIN — IPRATROPIUM BROMIDE AND ALBUTEROL SULFATE 3 ML: .5; 3 SOLUTION RESPIRATORY (INHALATION) at 00:08

## 2019-01-27 RX ADMIN — ALBUTEROL SULFATE 2.5 MG: 2.5 SOLUTION RESPIRATORY (INHALATION) at 17:44

## 2019-01-27 RX ADMIN — INSULIN ASPART 2 UNITS: 100 INJECTION, SOLUTION INTRAVENOUS; SUBCUTANEOUS at 22:06

## 2019-01-27 RX ADMIN — IPRATROPIUM BROMIDE AND ALBUTEROL SULFATE 3 ML: .5; 3 SOLUTION RESPIRATORY (INHALATION) at 11:33

## 2019-01-27 RX ADMIN — METHYLPREDNISOLONE SODIUM SUCCINATE 62.5 MG: 125 INJECTION, POWDER, FOR SOLUTION INTRAMUSCULAR; INTRAVENOUS at 07:50

## 2019-01-27 RX ADMIN — OMEPRAZOLE 40 MG: 20 CAPSULE, DELAYED RELEASE ORAL at 07:51

## 2019-01-27 RX ADMIN — LOSARTAN POTASSIUM 50 MG: 50 TABLET, FILM COATED ORAL at 07:51

## 2019-01-27 RX ADMIN — CITALOPRAM HYDROBROMIDE 20 MG: 20 TABLET ORAL at 07:52

## 2019-01-27 RX ADMIN — IPRATROPIUM BROMIDE AND ALBUTEROL SULFATE 3 ML: .5; 3 SOLUTION RESPIRATORY (INHALATION) at 07:32

## 2019-01-27 RX ADMIN — GLIPIZIDE 2.5 MG: 5 TABLET ORAL at 18:37

## 2019-01-27 RX ADMIN — HYDRALAZINE HYDROCHLORIDE 10 MG: 20 INJECTION INTRAMUSCULAR; INTRAVENOUS at 15:47

## 2019-01-27 RX ADMIN — CLONIDINE HYDROCHLORIDE 0.1 MG: 0.1 TABLET ORAL at 20:58

## 2019-01-27 RX ADMIN — SODIUM CHLORIDE 500 MG: 9 INJECTION, SOLUTION INTRAVENOUS at 08:04

## 2019-01-27 RX ADMIN — METHYLPREDNISOLONE SODIUM SUCCINATE 62.5 MG: 125 INJECTION, POWDER, FOR SOLUTION INTRAMUSCULAR; INTRAVENOUS at 19:08

## 2019-01-27 RX ADMIN — LOSARTAN POTASSIUM 25 MG: 25 TABLET ORAL at 19:08

## 2019-01-27 RX ADMIN — IPRATROPIUM BROMIDE AND ALBUTEROL SULFATE 3 ML: .5; 3 SOLUTION RESPIRATORY (INHALATION) at 14:31

## 2019-01-27 RX ADMIN — LORAZEPAM 0.5 MG: 2 INJECTION INTRAMUSCULAR; INTRAVENOUS at 19:15

## 2019-01-27 RX ADMIN — SENNOSIDES AND DOCUSATE SODIUM 1 TABLET: 8.6; 5 TABLET ORAL at 19:08

## 2019-01-27 ASSESSMENT — ACTIVITIES OF DAILY LIVING (ADL)
RETIRED_COMMUNICATION: 0-->UNDERSTANDS/COMMUNICATES WITHOUT DIFFICULTY
AMBULATION: 1-->ASSISTIVE EQUIPMENT
TRANSFERRING: 0-->INDEPENDENT
FALL_HISTORY_WITHIN_LAST_SIX_MONTHS: NO
ADLS_ACUITY_SCORE: 13
SWALLOWING: 0-->SWALLOWS FOODS/LIQUIDS WITHOUT DIFFICULTY
COGNITION: 0 - NO COGNITION ISSUES REPORTED
DRESS: 0-->INDEPENDENT
ADLS_ACUITY_SCORE: 14
BATHING: 0-->INDEPENDENT
TOILETING: 0-->INDEPENDENT
RETIRED_EATING: 0-->INDEPENDENT

## 2019-01-27 NOTE — PROGRESS NOTES
Pt is on 2 L NC with SpO2 95-98%. Breath sounds were coarse with wheezing. Patient is also using the IS and Acapella throughout the day with good effort. All nebs were given as ordered by MD and RT will continue to monitor and follow.     Brianne Del Rio, RRT  1/27/2019 3:45 PM

## 2019-01-27 NOTE — PLAN OF CARE
-diagnostic tests and consults completed and resulted - in progress  -vital signs normal or at patient baseline - not met  -tolerating oral intake to maintain hydration  - met  -dyspnea improved and O2 sats greater than 88% on room air or prior home oxygen levels  - not met  -returns to baseline functional status - not met  -safe disposition plan has been identified  - not met  Nurse to notify provider when observation goals have been met and patient is ready for discharge    Pt. A&Ox4.  VSS on 3L oxygen via NC.  Denies pain.  CMS intact.  Up w/SBA.  Voiding adequately.  Potassium recheck 3.9.  BG at 0200 = 188.  Mod carb cardiac diet.  Lung sounds diminished w/coarse RUL.  Purse lip breathing at times.  Nursing to continue to monitor.

## 2019-01-27 NOTE — PLAN OF CARE
A&Ox4. BP elevated in am, now WNL, VSS on 2L O2, pt baseline 1L O2. Mod carb/cardiac diet,  @ lunch. SBA. IV removed, catheter bent and came out of pt arm. WBC 16.1. Frequent productive cough, LS diminished. Duo neb scheduled q4h, per pt nasal congestion not improved, azithromycin x5 days ordered and started. Denies pain, n/t, nausea, dizziness, c/o SOB @ times and on exertion. Inpatient orders placed, report given to oncoming RN, all questions answered, pt inhalers sent w/ pt to [facility] .

## 2019-01-27 NOTE — H&P
[facility]    History and Physical - Hospitalist Service       Date of Admission:  1/26/2019    Assessment & Plan   Aisha Fitzgerald is a 83 year old female admitted on 1/26/2019. She presents with SOB, nasal congestion, and recent vomiting. She was found to have hypochloremic hyponatremia and mild hypokalemia in addition to an apparent COPD exacerbation likely secondary to viral URI. She was admitted to observation.    COPD exacerbation  Acute on chronic hypoxic respiratory failure  Likely viral URI  She reports feeling more short of breath in general - cough has actually improved with minimal if any sputum. Will continue treatment for mild COPD exacerbation at this point.    - PTA 1L nc O2 at night at home, requiring 2L to maintain saturations - titrate to above 88%  - Leukocytosis ~14.5k, afebrile, procalcitonin pending, no sputum available for sampling  - Tni flat at 0.021 and unchanged at repeat; EKG unremarkable  - CXR without infiltrate, mild atelectasis of R middle lobe again noted  - Received 125 solumedrol in ED, I will continue 60 bid at 8pm - with likely prednisone continue subsequently  - Continue PTA advair, hold daliresp, provide q4hr duonebs with albuterol neb available in between  - Will hold off from antibiotics pending procalcitonin results - if elevated, will initiate abx (ceftriaxone/azithro or doxy or levaquin are options) - also if she deteriorates could empirically initiate abx    Nausea/vomiting  - Reports emesis (nonbloody, no coffee ground appearance) yesterday with minimal oral intake of food or water for the last few days  - will continue NS IV maintenance today/tonight  - monitor BMP tomorrow    Hypochloremic hyponatremia  Hypokalemia  - likely secondary to gastric losses and poor po intake - sodium 128, chloride 91, potassium 3.1  - monitor and replete per protocol  - IVF as above  - BMP in morning    DM2  - continue PTA glipizide, reduce PTA glargine to 15u nightly  (18 at home)  - QID [facility] POC sugar checks    HTN, acutely elevated on admission at times  - continue PTA losartan, clonidine, metoprolol succ  - PRN hydralazine available for >180 systolic     Diet: Combination Diet [id number] Calories: Moderate Consistent CHO (4-6 CHO units/meal); Low Saturated Fat Na <2400mg Diet    DVT Prophylaxis: Pneumatic Compression Devices  Platt Catheter: not present  Code Status: Full Code - discussed with patient    Disposition Plan   Expected discharge: Possibly tomorrow pending improvement.    Entered: Froilan Alas MD 01/26/2019, 6:16 PM     The patient's care was discussed with the Bedside Nurse and Patient.    Froilan Alas MD  [facility]    ______________________________________________________________________    Chief Complaint   SOB, nasal congestion, and vomiting    History is obtained from the patient    History of Present Illness   Aisha Fitzgerald is a 83 year old female who has history of COPD diabetes and hypertension who presented to the ED today for worsening shortness of breath.  Associated with this is progressive nasal congestion last few days.  She denies any fevers or chills states that her chronic cough is actually better than usual.  Of note she does report minimal appetite and oral intake the last few days and was nauseous and vomiting yesterday.  She denies any sick contacts or recent travel.  She also denies any unusual food ingestion.  She continues to smoke several cigarettes per day which is been doing for over 50 years.  She has had 1 or 2 times last year where she is required ED evaluation for her COPD.  She denies any chest pain or tightness.  She denies any abdominal pain cramping diarrhea dysuria or increased urinary frequency.  She also denies any lower extremity swelling.  She has been taking her home medications as directed.    Review of Systems    The 10 point Review of Systems is negative other than noted in the HPI or here.      Past Medical History    I have reviewed this patient's medical history and updated it with pertinent information if needed.   Past Medical History:   Diagnosis Date     Bronchiolitis      Cancer (H)     skin cancer     COPD (chronic obstructive pulmonary disease) (H)      Diabetes (H)      Hypertension      Ulcer        Past Surgical History   I have reviewed this patient's surgical history and updated it with pertinent information if needed.  Past Surgical History:   Procedure Laterality Date     BREAST SURGERY       EYE SURGERY       ORTHOPEDIC SURGERY      left ankle        Social History   I have reviewed this patient's social history and updated it with pertinent information if needed.  Social History     Tobacco Use     Smoking status: Current Some Day Smoker     Packs/day: 0.50     Years: 50.00     Pack years: 25.00     Types: Cigarettes     Last attempt to quit: 2017     Years since quittin.7     Smokeless tobacco: Never Used   Substance Use Topics     Alcohol use: No     Drug use: No   - Ongoing 5 cig/day prior to this admission.    Family History   I have reviewed this patient's family history and updated it with pertinent information if needed.   Skin cancer reported in family.  Brother had a stroke at 55, was a smoker prior to that.     Prior to Admission Medications   Prior to Admission Medications   Prescriptions Last Dose Informant Patient Reported? Taking?   CITALOPRAM HYDROBROMIDE PO 2019 at AM Pharmacy Yes Yes   Sig: Take 20 mg by mouth daily.   CLONIDINE HCL PO 2019 at HS Pharmacy Yes Yes   Sig: Take 0.1 mg by mouth At Bedtime    COENZYME Q-10 PO 2019 at PM Self Yes Yes   Sig: Take 100 mg by mouth daily.   Cholecalciferol (VITAMIN D3 PO) 2019 at PM Self Yes Yes   Sig: Take 1,000 Units by mouth daily.   DALIRESP 500 MCG TABS tablet 2019 at PM  Yes Yes   Sig: Take 500 mcg by mouth daily    OMEPRAZOLE PO 2019 at AM Pharmacy Yes Yes   Sig: Take 40 mg by mouth  every morning   albuterol (PROVENTIL HFA: VENTOLIN HFA) 108 (90 BASE) MCG/ACT inhaler  at [facility] Yes Yes   Sig: Inhale 2-4 puffs into the lungs every 4 hours as needed    atorvastatin (LIPITOR) 40 MG tablet 1/25/2019 at PM  Yes Yes   Sig: Take 40 mg by mouth daily   calcium carb 1250 mg, 500 mg [place],/vitamin D 200 units (OSCAL WITH D) 500-200 MG-UNIT per tablet 1/25/2019 at PM Self Yes Yes   Sig: Take 0.5 tablets by mouth daily   fluticasone-salmeterol (ADVAIR) 250-50 MCG/DOSE diskus inhaler 1/26/2019 at AM Pharmacy Yes Yes   Sig: Inhale 1 puff into the lungs 2 times daily   glipiZIDE (GLUCOTROL) 5 MG tablet 1/25/2019 at PM  Yes Yes   Sig: Take 0.5 tablets by mouth 2 times daily   insulin glargine (LANTUS) 100 UNIT/ML PEN 1/25/2019 at PM Pharmacy Yes Yes   Sig: Inject 18 Units Subcutaneous At Bedtime    levothyroxine (SYNTHROID/LEVOTHROID) 100 MCG tablet 1/26/2019 at AM  Yes Yes   Sig: Take 100 mcg by mouth daily   losartan (COZAAR) 50 MG tablet 1/26/2019 at AM  Yes Yes   Sig: Take 50mg in the morning and 25mg in the evening.   metoprolol succinate ER (TOPROL-XL) 50 MG 24 hr tablet 1/25/2019 at PM  Yes Yes   Sig: Take 1 tablet by mouth daily   tiotropium (SPIRIVA) 18 MCG inhalation capsule 1/26/2019 at AM Pharmacy Yes Yes   Sig: Inhale 18 mcg into the lungs daily   vitamin B1 (THIAMINE) 100 MG tablet 1/25/2019 at Unknown time  Yes Yes   Sig: Take 100 mg by mouth daily   vitamin B6 (PYRIDOXINE) 25 MG tablet 1/25/2019 at Unknown time  Yes Yes   Sig: Take 0.5 tablets by mouth daily      Facility-Administered Medications: None     Allergies   Allergies   Allergen Reactions     Hctz [Hydrochlorothiazide] Unknown     Contrast Dye Rash       Physical Exam   Vital Signs: Temp: 98.2  F (36.8  C) Temp src: Oral BP: 166/53 Pulse: 108 Heart Rate: 107 Resp: 22 SpO2: 97 % O2 Device: Nasal cannula Oxygen Delivery: 3 LPM  Weight: 145 lbs 14.4 oz    General Appearance: NAD, fatigued, elderly  HEENT: normocephalic, EOMI,  MMM  Respiratory: coarse in general, no focal crackles, scattered mild wheeze  Cardiovascular: s1s2 heard, reg rate, no periph edema  GI: s, nt, nd, +bs  Lymph/Hematologic: no cervical adenopathy noted  Musculoskeletal: no tenderness noted in calves, pulses intact  Neurologic: aaox3, no focal deficits noted, EOMI, CN intact  Psychiatric: normal mood and affect    Data   Data reviewed today: I reviewed all medications, new labs and imaging results over the last 24 hours. I personally reviewed no images or EKG's today.    Recent Labs   Lab 01/26/19  1133 01/26/19  0815   WBC  --  14.5*   HGB  --  13.5   MCV  --  84   PLT  --  228   NA  --  128*   POTASSIUM  --  3.1*   CHLORIDE  --  91*   CO2  --  25   BUN  --  14   CR  --  0.61   ANIONGAP  --  12   YASMEEN  --  9.3   GLC  --  205*   TROPI 0.021 0.021     Most Recent 3 CBC's:  Recent Labs   Lab Test 01/26/19  0815 11/02/17  0912 05/27/17  0936   WBC 14.5* 13.0* 5.7   HGB 13.5 14.2 13.7   MCV 84 78 85    377 285     Most Recent 3 BMP's:  Recent Labs   Lab Test 01/26/19  0815 11/02/17  1232 11/02/17  0912 05/27/17  0936   *  --  132* 132*   POTASSIUM 3.1* 3.3* 2.8* 3.0*   CHLORIDE 91*  --  94 95   CO2 25  --  25 25   BUN 14  --  17 12   CR 0.61  --  0.80 0.81   ANIONGAP 12  --  13 12   YASMEEN 9.3  --  10.4* 9.2   *  --  206* 199*     Most Recent 3 Troponin's:  Recent Labs   Lab Test 01/26/19  1133 01/26/19  0815 07/06/15  1245   TROPI 0.021 0.021 <0.015  The 99th percentile for upper reference range is 0.045 ug/L.  Troponin values in   the range of 0.045 - 0.120 ug/L may be associated with risks of adverse   clinical events.       Recent Results (from the past 24 hour(s))   XR Chest 2 Views    Narrative    CHEST TWO VIEWS  January 26, 2019 8:38 AM    HISTORY: Shortness of breath.    COMPARISON: 11/25/2018.      Impression    IMPRESSION: Again seen is mild atelectasis of the right middle lobe.  This is best demonstrated on the lateral view and is likely  unchanged.  There is hyperexpansion of the lungs suggesting obstructive pulmonary  disease. There is slight blunting of the costophrenic angles which may  also be due to atelectasis or chronic scarring. No other abnormality  is noted. I see no definite change since the previous examination.     LILI SERRA MD

## 2019-01-27 NOTE — PLAN OF CARE
-diagnostic tests and consults completed and resulted - in progress  -vital signs normal or at patient baseline - not met  -tolerating oral intake to maintain hydration  - met  -dyspnea improved and O2 sats greater than 88% on room air or prior home oxygen levels  - not met  -returns to baseline functional status - not met  -safe disposition plan has been identified  - not met  Nurse to notify provider when observation goals have been met and patient is ready for discharge      Patient A&0x4. Up with SBA to bathroom. Lungs diminished through out. Pursed lip breathing noted. On 3 lts 02 via NC. No cough noted. Denies pain.

## 2019-01-27 NOTE — PROGRESS NOTES
[facility]    Medicine Progress Note - Hospitalist Service       Date of Admission:  1/26/2019  Assessment & Plan   Aisha Fitzgerald is a 83 year old female with insulin dependent T2DM, HTN, COPD utilizing supplemental oxygen via NC at 1 LPM at night who was admitted on 1/26/2019 with worsening SOB, diagnosed with acute hypoxic respiratory failure in the setting of COPD exacerbation. Utilizing supplemental oxygen via NC at 2 LPM, intermittently hypertensive throughout the night, pt currently stable.      Acute hypoxic respiratory failure in the setting of COPD exacerbation  Recent viral URI: Reports feeling more short of breath in general; had URI a few months ago, chronic cough has actually improved, intermittent sputum. Recent worsening nasal congestion and headache with associated SOB PTA utilizes 1L NC O2 at night at home, nothing during the day. Currently requiring 2L to maintain saturations. Leukocytosis ~14.5>16.1, procal 0.23, afebrile, no sputum available for sampling. Troponin flat at 0.021 and unchanged at repeat; EKG unremarkable. CXR without infiltrate, mild atelectasis of R middle lobe again noted  - Received 125 solumedrol in ED, continue 62.5 mg IV BID  - Azithromycin X5 days started 1/27   - Duonebs q4 hrs, Albuterol nebs in between  - RCAT consulted, encourage pulmonary toilet, IS and acapella ordered   - Continue PTA Advair, hold daliresp   - Ween oxygen as tolerated   - Transition to inpatient status   - CBC and BMP in the AM      Nausea/vomiting, improved: Reports emesis (nonbloody, no coffee ground appearance) the day prior to admission with minimal oral intake of food or water for the last few days  - IVF stopped 1/27 due to adequate PO intake   - Follow BMP      Hypochloremic hyponatremia, resolved  Hypokalemia, resolved: Likely secondary to gastric losses and poor po intake. Sodium 128>133, chloride 91>101, potassium 3.1>3.9.   - monitor and replete per protocol  - BMP in  morning     T2DM, insulin dependent: A1C 7.6 on 1/26/19  - Continue PTA glipizide 2.5 mg po BID  - Increase PTA glargine to back to home dose of 18 U at bedtime  - Medium sliding scale insulin   - BS per protocol   - Monitor for hypoglycemia     Recent Labs   Lab 01/27/19  1203 01/27/19  0814 01/27/19  0621 01/27/19  0219 01/26/19  2059 01/26/19  1448 01/26/19  0815   GLC  --   --  185*  --   --   --  205*   * 174*  --  188* 225* 206*  --       HTN, with intermittent elevation   - Continue PTA losartan 50 mg po every day and 25 mg po q evening, clonidine 0.1 mg po qhs, metoprolol succ 50 mg po qevening   - PRN hydralazine available for >180 systolic    Diet: Combination Diet [id number] Calories: Moderate Consistent CHO (4-6 CHO units/meal); Low Saturated Fat Na <2400mg Diet    DVT Prophylaxis: Ambulate every shift  Platt Catheter: not present  Code Status: Full Code    Disposition Plan   Expected discharge: 2 - 3 days, recommended to prior living arrangement once oxygen weened and transitioned to oral steroids and antibiotics .  Entered: Stacey Herrera PA-C 01/27/2019, 2:08 PM     The patient's care was discussed with the Attending Physician, Dr. Sanchez.    Stacey Herrera PA-C  Hospitalist Service  [facility]  ____________________________________________________________________    Interval History   Ongoing SOB utilizing supplemental oxygen at 2 LPM (above baseline). Sinus congestion reported. No N/V. Afebrile.     Data reviewed today: See below    Physical Exam   Vital Signs: Temp: 98.7  F (37.1  C) Temp src: Oral BP: 147/58 Pulse: 89 Heart Rate: 96 Resp: 20 SpO2: 98 % O2 Device: Nasal cannula Oxygen Delivery: 2 LPM  Weight: 145 lbs 14.4 oz    CONSTITUTIONAL: Pt laying in bed, dressed in hospital garb. Appears comfortable. Cooperative with interview.   HEENT: Normocephalic, atraumatic. Pupils equal, round, and reactive to light. Negative for conjunctival  redness or scleral icterus.  Oral mucosa pink and moist; negative for ulcerations, erythema, or exudates.  Dentition in good repair.   CARDIOVASCULAR: RRR, no murmurs, rubs, or extra heart sounds appreciated. Pulses +2/4 and regular in upper and lower extremities, bilaterally.   RESPIRATORY: No increased work of breathing.  Supplemental oxygenation via NC at 2 LPM. Course rhonchi throughout all fields.   GASTROINTESTINAL:  Abdomen soft, non-distended. BS auscultated in all four quadrants. Negative for tenderness to palpation.  No masses or organomegaly noted.  MUSCULOSKELETAL: No gross deformities noted. Normal muscle tone.   HEMATOLOGIC/LYMPHATIC/IMMUNOLOGIC: No anterior or posterior cervical LAD, bilaterally. Negative for lower extremity edema, bilaterally.  NEUROLOGIC: Alert and oriented to person, place, and time.  strength intact.   SKIN: Warm, dry, intact. No jaundice noted. Negative for suspicious lesions, rashes, bruising, open sores or abrasions.     Data   Recent Labs   Lab 01/27/19  0621 01/26/19  2325 01/26/19  1133 01/26/19  0815   WBC 16.1*  --   --  14.5*   HGB 12.3  --   --  13.5   MCV 85  --   --  84     --   --  228     --   --  128*   POTASSIUM 3.9 3.9  --  3.1*   CHLORIDE 101  --   --  91*   CO2 25  --   --  25   BUN 17  --   --  14   CR 0.54  --   --  0.61   ANIONGAP 7  --   --  12   YASMEEN 9.0  --   --  9.3   *  --   --  205*   TROPI  --   --  0.021 0.021     No results found for this or any previous visit (from the past 24 hour(s)).  Medications     sodium chloride 1,000 mL (01/27/19 0629)       citalopram  20 mg Oral Daily     cloNIDine  0.1 mg Oral At Bedtime     fluticasone-salmeterol  1 puff Inhalation BID     glipiZIDE  2.5 mg Oral BID     insulin aspart  1-7 Units Subcutaneous TID AC     insulin aspart  1-5 Units Subcutaneous At Bedtime     insulin glargine  18 Units Subcutaneous At Bedtime     ipratropium - albuterol 0.5 mg/2.5 mg/3 mL  3 mL Nebulization Q4H      levothyroxine  100 mcg Oral Daily     losartan  25 mg Oral Daily at 8 pm     losartan  50 mg Oral Daily with breakfast     methylPREDNISolone  62.5 mg Intravenous Q12H     metoprolol succinate ER  50 mg Oral Daily at 8 pm     omeprazole  40 mg Oral QAM     senna-docusate  1 tablet Oral BID    Or     senna-docusate  2 tablet Oral BID

## 2019-01-27 NOTE — PLAN OF CARE
-diagnostic tests and consults completed and resulted - in progress  -vital signs normal or at patient baseline - not met  -tolerating oral intake to maintain hydration  - met  -dyspnea improved and O2 sats greater than 88% on room air or prior home oxygen levels  - not met  -returns to baseline functional status - not met  -safe disposition plan has been identified  - not met  Nurse to notify provider when observation goals have been met and patient is ready for discharge

## 2019-01-27 NOTE — PLAN OF CARE
-diagnostic tests and consults completed and resulted - in progress  -vital signs normal or at patient baseline -  Partially met  -tolerating oral intake to maintain hydration  - met  -dyspnea improved and O2 sats greater than 88% on room air or prior home oxygen levels  - not met  -returns to baseline functional status - not met  -safe disposition plan has been identified  - not met  Nurse to notify provider when observation goals have been met and patient is ready for discharge

## 2019-01-27 NOTE — PLAN OF CARE
-diagnostic tests and consults completed and resulted - in progress  -vital signs normal or at patient baseline - not met, BP 200s, now 140s  -tolerating oral intake to maintain hydration  - met  -dyspnea improved and O2 sats greater than 88% on room air or prior home oxygen levels  - not met  -returns to baseline functional status - not met  -safe disposition plan has been identified  - not met  Nurse to notify provider when observation goals have been met and patient is ready for discharge

## 2019-01-27 NOTE — PROGRESS NOTES
Pt transferring to station 88 at this time. Report given to [facility] Nurse by Morning Nurse.

## 2019-01-28 LAB
ANION GAP SERPL CALCULATED.3IONS-SCNC: 5 MMOL/L (ref 3–14)
BASOPHILS # BLD AUTO: 0 10E9/L (ref 0–0.2)
BASOPHILS NFR BLD AUTO: 0 %
BUN SERPL-MCNC: 25 MG/DL (ref 7–30)
CALCIUM SERPL-MCNC: 9.2 MG/DL (ref 8.5–10.1)
CHLORIDE SERPL-SCNC: 99 MMOL/L (ref 94–109)
CO2 SERPL-SCNC: 26 MMOL/L (ref 20–32)
CREAT SERPL-MCNC: 0.62 MG/DL (ref 0.52–1.04)
DIFFERENTIAL METHOD BLD: ABNORMAL
EOSINOPHIL # BLD AUTO: 0 10E9/L (ref 0–0.7)
EOSINOPHIL NFR BLD AUTO: 0 %
ERYTHROCYTE [DISTWIDTH] IN BLOOD BY AUTOMATED COUNT: 14.4 % (ref 10–15)
GFR SERPL CREATININE-BSD FRML MDRD: 84 ML/MIN/{1.73_M2}
GLUCOSE BLDC GLUCOMTR-MCNC: 154 MG/DL (ref 70–99)
GLUCOSE BLDC GLUCOMTR-MCNC: 175 MG/DL (ref 70–99)
GLUCOSE BLDC GLUCOMTR-MCNC: 198 MG/DL (ref 70–99)
GLUCOSE BLDC GLUCOMTR-MCNC: 235 MG/DL (ref 70–99)
GLUCOSE BLDC GLUCOMTR-MCNC: 245 MG/DL (ref 70–99)
GLUCOSE SERPL-MCNC: 177 MG/DL (ref 70–99)
HCT VFR BLD AUTO: 36 % (ref 35–47)
HGB BLD-MCNC: 11.8 G/DL (ref 11.7–15.7)
IMM GRANULOCYTES # BLD: 0.1 10E9/L (ref 0–0.4)
IMM GRANULOCYTES NFR BLD: 0.9 %
LACTATE BLD-SCNC: 0.9 MMOL/L (ref 0.7–2)
LYMPHOCYTES # BLD AUTO: 1 10E9/L (ref 0.8–5.3)
LYMPHOCYTES NFR BLD AUTO: 8.4 %
MCH RBC QN AUTO: 28.3 PG (ref 26.5–33)
MCHC RBC AUTO-ENTMCNC: 32.8 G/DL (ref 31.5–36.5)
MCV RBC AUTO: 86 FL (ref 78–100)
MONOCYTES # BLD AUTO: 0.7 10E9/L (ref 0–1.3)
MONOCYTES NFR BLD AUTO: 6.1 %
NEUTROPHILS # BLD AUTO: 9.7 10E9/L (ref 1.6–8.3)
NEUTROPHILS NFR BLD AUTO: 84.6 %
NRBC # BLD AUTO: 0 10*3/UL
NRBC BLD AUTO-RTO: 0 /100
PLATELET # BLD AUTO: 228 10E9/L (ref 150–450)
POTASSIUM SERPL-SCNC: 4.2 MMOL/L (ref 3.4–5.3)
RBC # BLD AUTO: 4.17 10E12/L (ref 3.8–5.2)
SODIUM SERPL-SCNC: 130 MMOL/L (ref 133–144)
WBC # BLD AUTO: 11.9 10E9/L (ref 4–11)

## 2019-01-28 PROCEDURE — 94640 AIRWAY INHALATION TREATMENT: CPT | Mod: 76

## 2019-01-28 PROCEDURE — 40000275 ZZH STATISTIC RCP TIME EA 10 MIN

## 2019-01-28 PROCEDURE — 25000132 ZZH RX MED GY IP 250 OP 250 PS 637: Mod: GY | Performed by: PHYSICIAN ASSISTANT

## 2019-01-28 PROCEDURE — A9270 NON-COVERED ITEM OR SERVICE: HCPCS | Mod: GY | Performed by: HOSPITALIST

## 2019-01-28 PROCEDURE — 00000146 ZZHCL STATISTIC GLUCOSE BY METER IP

## 2019-01-28 PROCEDURE — A9270 NON-COVERED ITEM OR SERVICE: HCPCS | Mod: GY | Performed by: INTERNAL MEDICINE

## 2019-01-28 PROCEDURE — 85025 COMPLETE CBC W/AUTO DIFF WBC: CPT | Performed by: PHYSICIAN ASSISTANT

## 2019-01-28 PROCEDURE — 36415 COLL VENOUS BLD VENIPUNCTURE: CPT | Performed by: PHYSICIAN ASSISTANT

## 2019-01-28 PROCEDURE — 80048 BASIC METABOLIC PNL TOTAL CA: CPT | Performed by: PHYSICIAN ASSISTANT

## 2019-01-28 PROCEDURE — 25000132 ZZH RX MED GY IP 250 OP 250 PS 637: Mod: GY | Performed by: INTERNAL MEDICINE

## 2019-01-28 PROCEDURE — 25000128 H RX IP 250 OP 636: Performed by: INTERNAL MEDICINE

## 2019-01-28 PROCEDURE — 12000000 ZZH R&B MED SURG/OB

## 2019-01-28 PROCEDURE — A9270 NON-COVERED ITEM OR SERVICE: HCPCS | Mod: GY | Performed by: PHYSICIAN ASSISTANT

## 2019-01-28 PROCEDURE — 99233 SBSQ HOSP IP/OBS HIGH 50: CPT | Performed by: INTERNAL MEDICINE

## 2019-01-28 PROCEDURE — 25000128 H RX IP 250 OP 636: Performed by: HOSPITALIST

## 2019-01-28 PROCEDURE — 36415 COLL VENOUS BLD VENIPUNCTURE: CPT | Performed by: INTERNAL MEDICINE

## 2019-01-28 PROCEDURE — 25000125 ZZHC RX 250: Performed by: HOSPITALIST

## 2019-01-28 PROCEDURE — 99207 ZZC CDG-MDM COMPONENT: MEETS MODERATE - UP CODED: CPT | Performed by: INTERNAL MEDICINE

## 2019-01-28 PROCEDURE — 83605 ASSAY OF LACTIC ACID: CPT | Performed by: INTERNAL MEDICINE

## 2019-01-28 PROCEDURE — 25000132 ZZH RX MED GY IP 250 OP 250 PS 637: Mod: GY | Performed by: HOSPITALIST

## 2019-01-28 PROCEDURE — 25000131 ZZH RX MED GY IP 250 OP 636 PS 637: Mod: GY | Performed by: PHYSICIAN ASSISTANT

## 2019-01-28 RX ORDER — HYDRALAZINE HYDROCHLORIDE 20 MG/ML
10-20 INJECTION INTRAMUSCULAR; INTRAVENOUS EVERY 4 HOURS PRN
Status: DISCONTINUED | OUTPATIENT
Start: 2019-01-28 | End: 2019-01-30

## 2019-01-28 RX ORDER — GUAIFENESIN 600 MG/1
600 TABLET, EXTENDED RELEASE ORAL 2 TIMES DAILY
Status: DISCONTINUED | OUTPATIENT
Start: 2019-01-28 | End: 2019-01-30

## 2019-01-28 RX ORDER — CLONIDINE HYDROCHLORIDE 0.1 MG/1
0.1 TABLET ORAL ONCE
Status: COMPLETED | OUTPATIENT
Start: 2019-01-28 | End: 2019-01-28

## 2019-01-28 RX ORDER — SODIUM CHLORIDE 9 MG/ML
INJECTION, SOLUTION INTRAVENOUS CONTINUOUS
Status: DISCONTINUED | OUTPATIENT
Start: 2019-01-28 | End: 2019-01-29

## 2019-01-28 RX ORDER — AZITHROMYCIN 250 MG/1
250 TABLET, FILM COATED ORAL DAILY
Status: DISCONTINUED | OUTPATIENT
Start: 2019-01-28 | End: 2019-01-30

## 2019-01-28 RX ADMIN — METHYLPREDNISOLONE SODIUM SUCCINATE 62.5 MG: 125 INJECTION, POWDER, FOR SOLUTION INTRAMUSCULAR; INTRAVENOUS at 19:56

## 2019-01-28 RX ADMIN — METOPROLOL SUCCINATE 50 MG: 50 TABLET, EXTENDED RELEASE ORAL at 19:57

## 2019-01-28 RX ADMIN — IPRATROPIUM BROMIDE AND ALBUTEROL SULFATE 3 ML: .5; 3 SOLUTION RESPIRATORY (INHALATION) at 11:42

## 2019-01-28 RX ADMIN — METHYLPREDNISOLONE SODIUM SUCCINATE 62.5 MG: 125 INJECTION, POWDER, FOR SOLUTION INTRAMUSCULAR; INTRAVENOUS at 08:53

## 2019-01-28 RX ADMIN — CLONIDINE HYDROCHLORIDE 0.1 MG: 0.1 TABLET ORAL at 21:48

## 2019-01-28 RX ADMIN — IPRATROPIUM BROMIDE AND ALBUTEROL SULFATE 3 ML: .5; 3 SOLUTION RESPIRATORY (INHALATION) at 07:59

## 2019-01-28 RX ADMIN — SENNOSIDES AND DOCUSATE SODIUM 1 TABLET: 8.6; 5 TABLET ORAL at 08:53

## 2019-01-28 RX ADMIN — GUAIFENESIN 600 MG: 600 TABLET, EXTENDED RELEASE ORAL at 21:48

## 2019-01-28 RX ADMIN — HYDRALAZINE HYDROCHLORIDE 10 MG: 20 INJECTION INTRAMUSCULAR; INTRAVENOUS at 19:13

## 2019-01-28 RX ADMIN — SODIUM CHLORIDE: 9 INJECTION, SOLUTION INTRAVENOUS at 17:34

## 2019-01-28 RX ADMIN — IPRATROPIUM BROMIDE AND ALBUTEROL SULFATE 3 ML: .5; 3 SOLUTION RESPIRATORY (INHALATION) at 01:10

## 2019-01-28 RX ADMIN — AZITHROMYCIN MONOHYDRATE 250 MG: 250 TABLET ORAL at 12:48

## 2019-01-28 RX ADMIN — GLIPIZIDE 2.5 MG: 5 TABLET ORAL at 08:53

## 2019-01-28 RX ADMIN — LOSARTAN POTASSIUM 25 MG: 25 TABLET ORAL at 19:57

## 2019-01-28 RX ADMIN — NICOTINE POLACRILEX 2 MG: 2 GUM, CHEWING ORAL at 17:34

## 2019-01-28 RX ADMIN — SENNOSIDES AND DOCUSATE SODIUM 2 TABLET: 8.6; 5 TABLET ORAL at 19:57

## 2019-01-28 RX ADMIN — INSULIN GLARGINE 18 UNITS: 100 INJECTION, SOLUTION SUBCUTANEOUS at 21:49

## 2019-01-28 RX ADMIN — IPRATROPIUM BROMIDE AND ALBUTEROL SULFATE 3 ML: .5; 3 SOLUTION RESPIRATORY (INHALATION) at 18:48

## 2019-01-28 RX ADMIN — IPRATROPIUM BROMIDE AND ALBUTEROL SULFATE 3 ML: .5; 3 SOLUTION RESPIRATORY (INHALATION) at 14:51

## 2019-01-28 RX ADMIN — IPRATROPIUM BROMIDE AND ALBUTEROL SULFATE 3 ML: .5; 3 SOLUTION RESPIRATORY (INHALATION) at 04:36

## 2019-01-28 RX ADMIN — CITALOPRAM HYDROBROMIDE 20 MG: 20 TABLET ORAL at 08:52

## 2019-01-28 RX ADMIN — HYDRALAZINE HYDROCHLORIDE 10 MG: 20 INJECTION INTRAMUSCULAR; INTRAVENOUS at 21:53

## 2019-01-28 RX ADMIN — ALBUTEROL SULFATE 4 PUFF: 90 INHALANT RESPIRATORY (INHALATION) at 19:12

## 2019-01-28 RX ADMIN — GLIPIZIDE 2.5 MG: 5 TABLET ORAL at 17:34

## 2019-01-28 RX ADMIN — LEVOTHYROXINE SODIUM 100 MCG: 100 TABLET ORAL at 08:53

## 2019-01-28 RX ADMIN — LOSARTAN POTASSIUM 50 MG: 50 TABLET, FILM COATED ORAL at 08:53

## 2019-01-28 RX ADMIN — OMEPRAZOLE 40 MG: 20 CAPSULE, DELAYED RELEASE ORAL at 08:52

## 2019-01-28 RX ADMIN — LORAZEPAM 0.5 MG: 2 INJECTION INTRAMUSCULAR; INTRAVENOUS at 19:32

## 2019-01-28 ASSESSMENT — ACTIVITIES OF DAILY LIVING (ADL)
ADLS_ACUITY_SCORE: 16
ADLS_ACUITY_SCORE: 16
ADLS_ACUITY_SCORE: 18
ADLS_ACUITY_SCORE: 16

## 2019-01-28 NOTE — PROGRESS NOTES
"SPIRITUAL HEALTH SERVICES Progress Note  [facility]    Visited pt Aisha per pt's request. Pt reports that she feels better than yesterday.  Pt shared her life story with .  had a lengthy conversation with pt.  Pt said that she traveled many places with her friends. She worked in [employer] so she had many benefits from that.    Pt has three brothers and lost two brothers. Pt expressed her distress that her friends are dying; it is very difficult for her.   Pt is [employer] and states that her marco is very important. Pt said, \"I need God's strength and help so that I can breathe better.\" Pt requested prayer.      provided empathetic listening and emotional support, and prayer.     Pt requested another visit tomorrow and I will follow the pt tomorrow.       Norma Sanchez  Chaplain Resident    "

## 2019-01-28 NOTE — PROVIDER NOTIFICATION
MD Notification    Person notified:on call hosp    Person Name:Dr. Fulton    Date/Time:1/27/19  0673    Interaction:phone    Purpose of Notification:Patient very anxious, please advise.    Orders Received:PRN IV ativan dose ordered for patient    Comments:

## 2019-01-28 NOTE — PLAN OF CARE
Pt A&Ox4, anxious at times, lethargic. VSS on 1L oxygen, SpO2=90%. LS coarse/congested with exp wheezes. Denies pain. Up w/SBA. PIV SL.  Mod carb/cardiac diet, poor appetite. 1-2 units sliding scale insulin given with meals.

## 2019-01-28 NOTE — PLAN OF CARE
Patient alert and oriented, elevated BPs,scheduled meds effective, prn hydralazine available, HR tachy at times. On 2 L O2, baseline 1 L at HS, not able to wean. Complains of anxiety, PRN ativan ordered and given, effective. Fan and cool wash cloth also helped. On mod carb/cardiac diet, fair appetite. BGs corrected with sliding scale insulin. Pt up SBA. Discharge pending.

## 2019-01-28 NOTE — PROGRESS NOTES
[facility]    Medicine Progress Note - Hospitalist Service       Date of Admission:  1/26/2019  Assessment & Plan   Aisha Fitzgerald is a 83 year old female with insulin dependent T2DM, HTN, COPD utilizing supplemental oxygen via NC at 1 LPM at night who was admitted on 1/26/2019 with worsening SOB, diagnosed with acute hypoxic respiratory failure in the setting of COPD exacerbation. Utilizing supplemental oxygen via NC at 2 LPM, intermittently hypertensive throughout the night, pt currently stable.      Acute on chronic hypoxic respiratory failure   COPD exacerbation  Recent viral URI   - had URI a few months ago, chronic cough has actually improved, intermittent sputum. Recent worsening nasal congestion and headache with associated SOB   - PTA utilizes 1L NC O2 at night at home, nothing during the day. Currently requiring 2L to maintain saturations.  - still smokes 5 cigarettes/day  - on admission- leukocytosis ~14.5>16.1, procal 0.23, afebrile, troponin 0.21;  EKG unremarkable.   - CXR without infiltrate, mild atelectasis of R middle lobe again noted  - started on scheduled duonebs, IS, Solumedrol, Zithromax; Alb neb prn  - RCAT consulted, encourage pulmonary toilet, IS and acapella ordered   - Continue PTA Advair, hold PTA daliresp and Spiriva  - today- still on On 1-2 liters, states she does not feel well  - WBCs down from 14.5--16.1--11.9  - plan to continue iv steroids for today, maybe switch to po steroids tomorrow  - start Mucinex BID  - Sputum G stain/culture if able  - Ween oxygen as tolerated   - SW consult  - strongly encouraged to quit smoking     Nausea/vomiting, improved: Reported emesis (nonbloody, no coffee ground appearance) the day prior to admission with minimal oral intake of food or water for the last few days  - initially started on IVF but stopped on 1/27; still reports poor oral intake, resume mild iv hydration today  - Follow BMP      Hypochloremic hyponatremia,  "improved  Hypokalemia, resolved:  - has some degree on chronic hyponatremia; Na low Na was low  131--133 in 2015  - gastric losses due to vomiting and poor po intake likely contributing  - Na on admission 128 - improved to 133--130 today 1/28, chloride 91>101, potassium 3.1>3.9.   - reports poor oral intake; will resume mild iv hydration NS at 75cc/h  - monitor and replete per protocol     T2DM, insulin dependent: A1C 7.6 on 1/26/19  - Continue PTA glipizide 2.5 mg po BID and PTA glargine 18 U at bedtime  - Medium sliding scale insulin   - BS on higher side- but expected given iv steroids use  - Monitor for hypoglycemia     Recent Labs   Lab 01/28/19  1241 01/28/19  0913 01/28/19  0755 01/28/19  0213 01/27/19  2144 01/27/19  1812 01/27/19  1203  01/27/19  0621  01/26/19  0815   GLC  --   --  177*  --   --   --   --   --  185*  --  205*   * 154*  --  235* 252* 205* 193*   < >  --    < >  --     < > = values in this interval not displayed.      HTN  - Continue PTA Losartan 50 mg po qam and 25 mg po qm, clonidine 0.1 mg po qhs, Toprol XL 50 mg po qevening   - PRN hydralazine available for >180 systolic    Hypothyroidism  - continue PTA Synthroid    Tobacco use disorder  - reports smoking 5 cigarettes/day  - strongly encouraged to quit smoking  - Nicotine gum prn     Diet: Combination Diet 6495-4925 Calories: Moderate Consistent CHO (4-6 CHO units/meal); Low Saturated Fat Na <2400mg Diet    DVT Prophylaxis: Ambulate every shift  Platt Catheter: not present  Code Status: Full Code    Disposition Plan   Expected discharge: 1-2 days, recommended to prior living arrangement once oxygen weened and transitioned to oral steroids and antibiotics .  Entered: Margarita Mcwilliams MD 01/28/2019, 3:18 PM         Margarita Mcwilliams MD  Hospitalist Service  [facility]  ____________________________________________________________________    Interval History    Reports \"not feeling well\"; did not sleep well " "last night; reports some SOB and intermittent nonproductive cough; denies chest pain; reports poor appetite; discussed with bedside RN    Data reviewed today: See below    Physical Exam   Vital Signs: Temp: 98.5  F (36.9  C) Temp src: Oral BP: 149/66   Heart Rate: 80 Resp: 20 SpO2: 95 % O2 Device: Nasal cannula Oxygen Delivery: 2 LPM  Weight: 145 lbs 14.4 oz    CONSTITUTIONAL: Awake, alert, mildly anxious  HEENT: Normocephalic, atraumatic. PERRL  CARDIOVASCULAR: S1S2, RRR, no murmurs, rubs  RESPIRATORY: diminished air entry bilateral with intermitted coarse breath sounds; \"rattling sounds\" in her chest; nonproductive cough noted  GASTROINTESTINAL:  Abdomen soft, non-distended, nontender, BS present.  MUSCULOSKELETAL: No gross deformities noted. Normal muscle tone.   Extremities- no lower extremity edema, bilaterally.  NEUROLOGIC:AAOX3, no FNDs  SKIN: Warm, dry, intact. No jaundice noted. Negative for suspicious lesions, rashes, bruising, open sores or abrasions.     Data   Recent Labs   Lab 01/28/19  0755 01/27/19  0621 01/26/19  2325 01/26/19  1133 01/26/19  0815   WBC 11.9* 16.1*  --   --  14.5*   HGB 11.8 12.3  --   --  13.5   MCV 86 85  --   --  84    228  --   --  228   * 133  --   --  128*   POTASSIUM 4.2 3.9 3.9  --  3.1*   CHLORIDE 99 101  --   --  91*   CO2 26 25  --   --  25   BUN 25 17  --   --  14   CR 0.62 0.54  --   --  0.61   ANIONGAP 5 7  --   --  12   YASMEEN 9.2 9.0  --   --  9.3   * 185*  --   --  205*   TROPI  --   --   --  0.021 0.021     No results found for this or any previous visit (from the past 24 hour(s)).  Medications       azithromycin  250 mg Oral Daily     citalopram  20 mg Oral Daily     cloNIDine  0.1 mg Oral At Bedtime     fluticasone-salmeterol  1 puff Inhalation BID     glipiZIDE  2.5 mg Oral BID     insulin aspart  1-7 Units Subcutaneous TID AC     insulin aspart  1-5 Units Subcutaneous At Bedtime     insulin glargine  18 Units Subcutaneous At Bedtime     " ipratropium - albuterol 0.5 mg/2.5 mg/3 mL  3 mL Nebulization Q4H     levothyroxine  100 mcg Oral Daily     losartan  25 mg Oral Daily at 8 pm     losartan  50 mg Oral Daily with breakfast     methylPREDNISolone  62.5 mg Intravenous Q12H     metoprolol succinate ER  50 mg Oral Daily at 8 pm     omeprazole  40 mg Oral QAM     senna-docusate  1 tablet Oral BID    Or     senna-docusate  2 tablet Oral BID

## 2019-01-28 NOTE — PLAN OF CARE
Pt A&Ox4.  VSS on 2L oxygen via NC, can be tachy and HTN at times.  Denies pain.  CMS intact.  Up w/SBA.  Voiding adequately.  PIV SL.  BG = 235 at 0200.  Mod carb cardiac diet.  Nursing to continue to monitor.

## 2019-01-29 LAB
ANION GAP SERPL CALCULATED.3IONS-SCNC: 5 MMOL/L (ref 3–14)
BUN SERPL-MCNC: 25 MG/DL (ref 7–30)
CALCIUM SERPL-MCNC: 9 MG/DL (ref 8.5–10.1)
CHLORIDE SERPL-SCNC: 98 MMOL/L (ref 94–109)
CO2 SERPL-SCNC: 27 MMOL/L (ref 20–32)
CREAT SERPL-MCNC: 0.62 MG/DL (ref 0.52–1.04)
GFR SERPL CREATININE-BSD FRML MDRD: 84 ML/MIN/{1.73_M2}
GLUCOSE BLDC GLUCOMTR-MCNC: 125 MG/DL (ref 70–99)
GLUCOSE BLDC GLUCOMTR-MCNC: 159 MG/DL (ref 70–99)
GLUCOSE BLDC GLUCOMTR-MCNC: 163 MG/DL (ref 70–99)
GLUCOSE BLDC GLUCOMTR-MCNC: 179 MG/DL (ref 70–99)
GLUCOSE BLDC GLUCOMTR-MCNC: 230 MG/DL (ref 70–99)
GLUCOSE SERPL-MCNC: 135 MG/DL (ref 70–99)
POTASSIUM SERPL-SCNC: 4.7 MMOL/L (ref 3.4–5.3)
SODIUM SERPL-SCNC: 130 MMOL/L (ref 133–144)

## 2019-01-29 PROCEDURE — 25000132 ZZH RX MED GY IP 250 OP 250 PS 637: Mod: GY | Performed by: HOSPITALIST

## 2019-01-29 PROCEDURE — 00000146 ZZHCL STATISTIC GLUCOSE BY METER IP

## 2019-01-29 PROCEDURE — A9270 NON-COVERED ITEM OR SERVICE: HCPCS | Mod: GY | Performed by: HOSPITALIST

## 2019-01-29 PROCEDURE — 99232 SBSQ HOSP IP/OBS MODERATE 35: CPT | Performed by: INTERNAL MEDICINE

## 2019-01-29 PROCEDURE — 36415 COLL VENOUS BLD VENIPUNCTURE: CPT | Performed by: INTERNAL MEDICINE

## 2019-01-29 PROCEDURE — A9270 NON-COVERED ITEM OR SERVICE: HCPCS | Mod: GY | Performed by: PHYSICIAN ASSISTANT

## 2019-01-29 PROCEDURE — 25000125 ZZHC RX 250: Performed by: HOSPITALIST

## 2019-01-29 PROCEDURE — 94640 AIRWAY INHALATION TREATMENT: CPT | Mod: 76

## 2019-01-29 PROCEDURE — 40000275 ZZH STATISTIC RCP TIME EA 10 MIN

## 2019-01-29 PROCEDURE — 25000132 ZZH RX MED GY IP 250 OP 250 PS 637: Mod: GY | Performed by: INTERNAL MEDICINE

## 2019-01-29 PROCEDURE — 25000131 ZZH RX MED GY IP 250 OP 636 PS 637: Mod: GY | Performed by: PHYSICIAN ASSISTANT

## 2019-01-29 PROCEDURE — 12000000 ZZH R&B MED SURG/OB

## 2019-01-29 PROCEDURE — 25000132 ZZH RX MED GY IP 250 OP 250 PS 637: Mod: GY | Performed by: PHYSICIAN ASSISTANT

## 2019-01-29 PROCEDURE — 94799 UNLISTED PULMONARY SVC/PX: CPT

## 2019-01-29 PROCEDURE — A9270 NON-COVERED ITEM OR SERVICE: HCPCS | Mod: GY | Performed by: INTERNAL MEDICINE

## 2019-01-29 PROCEDURE — 80048 BASIC METABOLIC PNL TOTAL CA: CPT | Performed by: INTERNAL MEDICINE

## 2019-01-29 PROCEDURE — 25000128 H RX IP 250 OP 636: Performed by: HOSPITALIST

## 2019-01-29 RX ORDER — LOSARTAN POTASSIUM 50 MG/1
50 TABLET ORAL
Status: DISCONTINUED | OUTPATIENT
Start: 2019-01-29 | End: 2019-01-29

## 2019-01-29 RX ORDER — LOSARTAN POTASSIUM 50 MG/1
50 TABLET ORAL 2 TIMES DAILY
Status: DISCONTINUED | OUTPATIENT
Start: 2019-01-29 | End: 2019-02-03 | Stop reason: HOSPADM

## 2019-01-29 RX ORDER — PREDNISONE 20 MG/1
40 TABLET ORAL DAILY
Status: DISCONTINUED | OUTPATIENT
Start: 2019-01-30 | End: 2019-02-02

## 2019-01-29 RX ADMIN — OMEPRAZOLE 40 MG: 20 CAPSULE, DELAYED RELEASE ORAL at 08:23

## 2019-01-29 RX ADMIN — IPRATROPIUM BROMIDE AND ALBUTEROL SULFATE 3 ML: .5; 3 SOLUTION RESPIRATORY (INHALATION) at 19:21

## 2019-01-29 RX ADMIN — AZITHROMYCIN MONOHYDRATE 250 MG: 250 TABLET ORAL at 08:23

## 2019-01-29 RX ADMIN — CITALOPRAM HYDROBROMIDE 20 MG: 20 TABLET ORAL at 08:23

## 2019-01-29 RX ADMIN — LEVOTHYROXINE SODIUM 100 MCG: 100 TABLET ORAL at 08:24

## 2019-01-29 RX ADMIN — IPRATROPIUM BROMIDE AND ALBUTEROL SULFATE 3 ML: .5; 3 SOLUTION RESPIRATORY (INHALATION) at 07:07

## 2019-01-29 RX ADMIN — IPRATROPIUM BROMIDE AND ALBUTEROL SULFATE 3 ML: .5; 3 SOLUTION RESPIRATORY (INHALATION) at 15:14

## 2019-01-29 RX ADMIN — SENNOSIDES AND DOCUSATE SODIUM 2 TABLET: 8.6; 5 TABLET ORAL at 08:23

## 2019-01-29 RX ADMIN — LOSARTAN POTASSIUM 50 MG: 50 TABLET, FILM COATED ORAL at 08:23

## 2019-01-29 RX ADMIN — INSULIN GLARGINE 18 UNITS: 100 INJECTION, SOLUTION SUBCUTANEOUS at 21:46

## 2019-01-29 RX ADMIN — SENNOSIDES AND DOCUSATE SODIUM 2 TABLET: 8.6; 5 TABLET ORAL at 20:27

## 2019-01-29 RX ADMIN — METOPROLOL SUCCINATE 50 MG: 50 TABLET, EXTENDED RELEASE ORAL at 20:27

## 2019-01-29 RX ADMIN — METHYLPREDNISOLONE SODIUM SUCCINATE 62.5 MG: 125 INJECTION, POWDER, FOR SOLUTION INTRAMUSCULAR; INTRAVENOUS at 08:22

## 2019-01-29 RX ADMIN — GLIPIZIDE 2.5 MG: 5 TABLET ORAL at 08:24

## 2019-01-29 RX ADMIN — GUAIFENESIN 600 MG: 600 TABLET, EXTENDED RELEASE ORAL at 08:22

## 2019-01-29 RX ADMIN — GLIPIZIDE 2.5 MG: 5 TABLET ORAL at 17:47

## 2019-01-29 RX ADMIN — IPRATROPIUM BROMIDE AND ALBUTEROL SULFATE 3 ML: .5; 3 SOLUTION RESPIRATORY (INHALATION) at 11:07

## 2019-01-29 RX ADMIN — CLONIDINE HYDROCHLORIDE 0.1 MG: 0.1 TABLET ORAL at 21:46

## 2019-01-29 RX ADMIN — GUAIFENESIN 600 MG: 600 TABLET, EXTENDED RELEASE ORAL at 20:27

## 2019-01-29 RX ADMIN — LOSARTAN POTASSIUM 50 MG: 50 TABLET, FILM COATED ORAL at 20:27

## 2019-01-29 ASSESSMENT — ACTIVITIES OF DAILY LIVING (ADL)
ADLS_ACUITY_SCORE: 18
ADLS_ACUITY_SCORE: 16
ADLS_ACUITY_SCORE: 16
ADLS_ACUITY_SCORE: 17

## 2019-01-29 NOTE — PLAN OF CARE
AO. VSS, weaned to 1 L NC overnight. LS coarse with exp wheezes, scheduled nebs. Denies SOB at this time. Mod carb diet. IV infusing at 75 mL/hr. SBA, calls appropriately.

## 2019-01-29 NOTE — PLAN OF CARE
Pt A&Ox4, anxious at times, PRN ativan given x1, effective. Sating low 90s on 1L NC. Continues to be HTN, PRN hydralazine given x2, effective. LS coarse/congested with exp audible wheezes, scheduled and PRN nebs and PRN albuterol inhaler. Reports SOB at rest at times. Denies pain. Mod carb/cardiac diet, fair appetite this evening, getting frustrated with diet orders. PIV infusing NS @ 75mL/hr. BGs checks with sliding scale insulin. Up SBA, voiding adequately in bathroom. Encouraged activity and to be up in chairs for meals. Nursing to continue to monitor.

## 2019-01-29 NOTE — PROVIDER NOTIFICATION
MD Notification    Notified Person: MD    Notified Person Name: Dr. Aly     Notification Date/Time: 1/28/19 2100    Notification Interaction: Phone     Purpose of Notification: Pt continues to be hypertensive after receiving PRN hydralazine.     Orders Received: Provider ordered additional PRN hydralazine to be given and an additional .1 mg of clonidine.    Comments:

## 2019-01-29 NOTE — PROGRESS NOTES
SP[facility]ITUAL HE[facility] SERVICES Progress Note  [facility]    Follow-up visit with pt, per request.  Pt shared some of her recent health challenges, although she noted that she is feeling much better today.  Pt is hopeful that she may be able to discharge home in the next 1-2 days.  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 [employer] in [place]. She declined my offer of a visit from our hospital .  Engaged in conversation and provided support and prayer.  SH team is available if needs arise.                                                                                                                                                 La Ortiz M.A.  Staff   Pager [phone]  Phone [phone]

## 2019-01-29 NOTE — CONSULTS
Care Transition Initial Assessment - SW  Reason For Consult: discharge planning  Met with: PATIENT  Active Problems:    SOB (shortness of breath)    COPD exacerbation (H)       DATA  Lives With: alone      Quality of Family Relationships: supportive, involved  Description of Support System: Supportive, Involved  Who is your support system?: Sibling(s), Other (specify)(friends)  Support Assessment: Adequate family and caregiver support.   Identified issues/concerns regarding health management: SW following for d.c needs  Quality of Family Relationships: supportive, involved     ASSESSMENT  Cognitive Status:  Alert and oriented  Concerns to be addressed: Patient is a 83 year old female who was admitted to the hospital for shortness of breath. Prior to hospitalization patient was living at home alone in a house where she was managing well. Patient has O2 at night prior to hospitalization. Patient states her support system includes friends and family. Patient has no children or spouse. Patient is aware that PT is to assess tomorrow and will make recommendations regarding d.c. SW will follow up after assessment is complete.     PLAN  Financial costs for the patient includes   Patient given options and choices for discharge   Patient/family is agreeable to the plan?  YES  Patient Goals and Preferences: TBD  Patient anticipates discharging to: TBD    JEVON Owen   *[phone]

## 2019-01-29 NOTE — PROGRESS NOTES
[facility]    Medicine Progress Note - Hospitalist Service       Date of Admission:  1/26/2019  Date of Service: 1/29/2019    Assessment & Plan   Aisha Fitzgerald is a 83 year old female with insulin dependent T2DM, HTN, COPD utilizing supplemental oxygen via NC at 1 LPM at night who was admitted on 1/26/2019 with worsening SOB, diagnosed with acute hypoxic respiratory failure in the setting of COPD exacerbation.      Acute on chronic hypoxic respiratory failure   COPD exacerbation  Recent viral URI   - had URI a few months ago, chronic cough has actually improved, intermittent sputum. Recent worsening nasal congestion and headache with associated SOB   - PTA on 1L NC O2 at night at home, nothing during the day.  - still smokes 5 cigarettes/day  - on admission- leukocytosis ~14.5>16.1, procal 0.23, afebrile, troponin 0.21;  EKG unremarkable.   - CXR without infiltrate, mild atelectasis of R middle lobe again noted  - started on scheduled duonebs, IS, Solumedrol, Zithromax; Alb neb prn, Mucinex BID  - RCAT consulted, encourage pulmonary toilet, IS and acapella ordered   - Continue PTA Advair, hold PTA daliresp and Spiriva  - she is feeling better today, on On 1-1.5 liters, still having some nonproductive cough  - WBCs down from 14.5--16.1--11.9  - plan to switch to po steroids tomorrow  - Wean oxygen as tolerated   - PT eval  - SW consult  - strongly encouraged to quit smoking     Nausea/vomiting, improved: Reported emesis (nonbloody, no coffee ground appearance) the day prior to admission with minimal oral intake of food or water for the last few days  - initially started on IVF, yesterday- still with poor oral intake  - today 1/29- appetite improved  - stop iv fluids     Hypochloremic hyponatremia, improved  Hypokalemia, resolved:  - has some degree on chronic hyponatremia; Na low Na was low  131--133 in 2015  - gastric losses due to vomiting and poor po intake likely contributing  - Na on  admission 128; started on iv fluids; Na improved to 133--130--130 stable  - also chloride 91-->101, potassium 3.1-->3.9-->4.7.   - stop iv fluids     T2DM, insulin dependent: A1C 7.6 on 1/26/19  - Continue PTA glipizide 2.5 mg po BID and PTA glargine 18 U at bedtime  - Medium sliding scale insulin   - BS on higher side- but expected given iv steroids use  - Monitor for hypoglycemia     Recent Labs   Lab 01/29/19  1146 01/29/19  0808 01/29/19  0719 01/29/19  0241 01/28/19  2144 01/28/19  1724 01/28/19  1241  01/28/19  0755  01/27/19  0621  01/26/19  0815   GLC  --   --  135*  --   --   --   --   --  177*  --  185*  --  205*   * 125*  --  163* 175* 245* 198*   < >  --    < >  --    < >  --     < > = values in this interval not displayed.      HTN  [PTA Losartan 50 mg po qam and 25 mg po qm, clonidine 0.1 mg po qhs, Toprol XL 50 mg po qevening]  - BP meds resumed on admission  - had elevated BP's last evening with SBP in 180-200's  - will increase Losartan to 50 mg po BID; continue PTA Clonidine and Toprol XL  - monitor BPs  - PRN hydralazine available for SBP>180     Hypothyroidism  - continue PTA Synthroid    Tobacco use disorder  - reports smoking 5 cigarettes/day  - strongly encouraged to quit smoking  - Nicotine gum prn     Diet: Combination Diet 4365-6277 Calories: Moderate Consistent CHO (4-6 CHO units/meal); Low Saturated Fat Na <2400mg Diet  Room Service    DVT Prophylaxis: Ambulate every shift  Platt Catheter: not present  Code Status: Full Code    Disposition Plan   Expected discharge: 1-2 days, recommended to prior living arrangement once clinically better.  Entered: Margarita Mcwilliams MD 01/29/2019, 3:51 PM         Margarita Mcwilliams MD  Hospitalist Service  [facility]  ____________________________________________________________________    Interval History    Reports feeling better today, still with some nonproductive cough; no chest pain; slept better, appetite improved;  discussed with bedside RN    Data reviewed today: See below    Physical Exam   Vital Signs: Temp: 96.8  F (36  C) Temp src: Oral BP: 163/66 Pulse: 86 Heart Rate: 88 Resp: 20 SpO2: 94 % O2 Device: Nasal cannula Oxygen Delivery: 1 LPM  Weight: 145 lbs 14.4 oz    CONSTITUTIONAL: Awake, alert, mildly anxious  HEENT: Normocephalic, atraumatic. PERRL  CARDIOVASCULAR: S1S2, RRR, no murmurs, rubs  RESPIRATORY: diminished air entry bilateral with intermitted coarse breath sounds; nonproductive cough noted  GASTROINTESTINAL:  Abdomen soft, non-distended, nontender, BS present.  MUSCULOSKELETAL: No gross deformities noted. Normal muscle tone.   Extremities- no lower extremity edema, bilaterally.  NEUROLOGIC:AAOX3, no FNDs  SKIN: Warm, dry, intact. No jaundice noted. Negative for suspicious lesions, rashes, bruising, open sores or abrasions.     Data   Recent Labs   Lab 01/29/19  0719 01/28/19  0755 01/27/19  0621  01/26/19  1133 01/26/19  0815   WBC  --  11.9* 16.1*  --   --  14.5*   HGB  --  11.8 12.3  --   --  13.5   MCV  --  86 85  --   --  84   PLT  --  228 228  --   --  228   * 130* 133  --   --  128*   POTASSIUM 4.7 4.2 3.9   < >  --  3.1*   CHLORIDE 98 99 101  --   --  91*   CO2 27 26 25  --   --  25   BUN 25 25 17  --   --  14   CR 0.62 0.62 0.54  --   --  0.61   ANIONGAP 5 5 7  --   --  12   YASMEEN 9.0 9.2 9.0  --   --  9.3   * 177* 185*  --   --  205*   TROPI  --   --   --   --  0.021 0.021    < > = values in this interval not displayed.     No results found for this or any previous visit (from the past 24 hour(s)).  Medications       azithromycin  250 mg Oral Daily     citalopram  20 mg Oral Daily     cloNIDine  0.1 mg Oral At Bedtime     fluticasone-salmeterol  1 puff Inhalation BID     glipiZIDE  2.5 mg Oral BID     guaiFENesin  600 mg Oral BID     insulin aspart  1-7 Units Subcutaneous TID AC     insulin aspart  1-5 Units Subcutaneous At Bedtime     insulin glargine  18 Units Subcutaneous At Bedtime      ipratropium - albuterol 0.5 mg/2.5 mg/3 mL  3 mL Nebulization Q4H     levothyroxine  100 mcg Oral Daily     losartan  25 mg Oral Daily at 8 pm     losartan  50 mg Oral Daily with breakfast     metoprolol succinate ER  50 mg Oral Daily at 8 pm     omeprazole  40 mg Oral QAM     [START ON 1/30/2019] predniSONE  40 mg Oral Daily     senna-docusate  1 tablet Oral BID    Or     senna-docusate  2 tablet Oral BID

## 2019-01-29 NOTE — PLAN OF CARE
A/O. Up with assist of 1. GB and cane. CMS intact. VSS. BS active. Tolerating diet. Voiding without issue. LS course crackles in the right. On 1L o2. Blood sugar 124 and 156. Patient states that she is starting to feel much better.

## 2019-01-30 ENCOUNTER — APPOINTMENT (OUTPATIENT)
Dept: PHYSICAL THERAPY | Facility: CLINIC | Age: 84
DRG: 190 | End: 2019-01-30
Payer: MEDICARE

## 2019-01-30 LAB
BACTERIA SPEC CULT: ABNORMAL
GLUCOSE BLDC GLUCOMTR-MCNC: 113 MG/DL (ref 70–99)
GLUCOSE BLDC GLUCOMTR-MCNC: 155 MG/DL (ref 70–99)
GLUCOSE BLDC GLUCOMTR-MCNC: 201 MG/DL (ref 70–99)
GLUCOSE BLDC GLUCOMTR-MCNC: 60 MG/DL (ref 70–99)
GLUCOSE BLDC GLUCOMTR-MCNC: 84 MG/DL (ref 70–99)
GLUCOSE BLDC GLUCOMTR-MCNC: 92 MG/DL (ref 70–99)
GRAM STN SPEC: ABNORMAL
Lab: ABNORMAL
SPECIMEN SOURCE: ABNORMAL
SPECIMEN SOURCE: ABNORMAL

## 2019-01-30 PROCEDURE — 97116 GAIT TRAINING THERAPY: CPT | Mod: GP

## 2019-01-30 PROCEDURE — 00000146 ZZHCL STATISTIC GLUCOSE BY METER IP

## 2019-01-30 PROCEDURE — 25000125 ZZHC RX 250: Performed by: INTERNAL MEDICINE

## 2019-01-30 PROCEDURE — 40000275 ZZH STATISTIC RCP TIME EA 10 MIN

## 2019-01-30 PROCEDURE — A9270 NON-COVERED ITEM OR SERVICE: HCPCS | Mod: GY | Performed by: HOSPITALIST

## 2019-01-30 PROCEDURE — 97161 PT EVAL LOW COMPLEX 20 MIN: CPT | Mod: GP

## 2019-01-30 PROCEDURE — 99233 SBSQ HOSP IP/OBS HIGH 50: CPT | Performed by: INTERNAL MEDICINE

## 2019-01-30 PROCEDURE — 25000132 ZZH RX MED GY IP 250 OP 250 PS 637: Mod: GY | Performed by: HOSPITALIST

## 2019-01-30 PROCEDURE — A9270 NON-COVERED ITEM OR SERVICE: HCPCS | Mod: GY | Performed by: INTERNAL MEDICINE

## 2019-01-30 PROCEDURE — 25000125 ZZHC RX 250: Performed by: HOSPITALIST

## 2019-01-30 PROCEDURE — 94640 AIRWAY INHALATION TREATMENT: CPT | Mod: 76

## 2019-01-30 PROCEDURE — 25000128 H RX IP 250 OP 636: Performed by: INTERNAL MEDICINE

## 2019-01-30 PROCEDURE — 94640 AIRWAY INHALATION TREATMENT: CPT

## 2019-01-30 PROCEDURE — 40000193 ZZH STATISTIC PT WARD VISIT

## 2019-01-30 PROCEDURE — 25000132 ZZH RX MED GY IP 250 OP 250 PS 637: Mod: GY | Performed by: INTERNAL MEDICINE

## 2019-01-30 PROCEDURE — 97110 THERAPEUTIC EXERCISES: CPT | Mod: GP

## 2019-01-30 PROCEDURE — 87205 SMEAR GRAM STAIN: CPT | Performed by: INTERNAL MEDICINE

## 2019-01-30 PROCEDURE — A9270 NON-COVERED ITEM OR SERVICE: HCPCS | Mod: GY | Performed by: PHYSICIAN ASSISTANT

## 2019-01-30 PROCEDURE — 25000131 ZZH RX MED GY IP 250 OP 636 PS 637: Mod: GY | Performed by: INTERNAL MEDICINE

## 2019-01-30 PROCEDURE — 25000132 ZZH RX MED GY IP 250 OP 250 PS 637: Mod: GY | Performed by: PHYSICIAN ASSISTANT

## 2019-01-30 PROCEDURE — 12000000 ZZH R&B MED SURG/OB

## 2019-01-30 RX ORDER — IPRATROPIUM BROMIDE AND ALBUTEROL SULFATE 2.5; .5 MG/3ML; MG/3ML
3 SOLUTION RESPIRATORY (INHALATION)
Status: DISCONTINUED | OUTPATIENT
Start: 2019-01-30 | End: 2019-02-03 | Stop reason: HOSPADM

## 2019-01-30 RX ORDER — HYDRALAZINE HYDROCHLORIDE 20 MG/ML
10-20 INJECTION INTRAMUSCULAR; INTRAVENOUS EVERY 4 HOURS PRN
Status: DISCONTINUED | OUTPATIENT
Start: 2019-01-30 | End: 2019-02-03 | Stop reason: HOSPADM

## 2019-01-30 RX ORDER — ACETYLCYSTEINE 200 MG/ML
4 SOLUTION ORAL; RESPIRATORY (INHALATION) EVERY 4 HOURS
Status: DISCONTINUED | OUTPATIENT
Start: 2019-01-30 | End: 2019-01-30

## 2019-01-30 RX ORDER — GUAIFENESIN 600 MG/1
1200 TABLET, EXTENDED RELEASE ORAL 2 TIMES DAILY
Status: DISCONTINUED | OUTPATIENT
Start: 2019-01-30 | End: 2019-02-03 | Stop reason: HOSPADM

## 2019-01-30 RX ORDER — LEVOFLOXACIN 500 MG/1
500 TABLET, FILM COATED ORAL DAILY
Status: DISCONTINUED | OUTPATIENT
Start: 2019-01-30 | End: 2019-02-03 | Stop reason: HOSPADM

## 2019-01-30 RX ORDER — ACETYLCYSTEINE 200 MG/ML
4 SOLUTION ORAL; RESPIRATORY (INHALATION)
Status: DISCONTINUED | OUTPATIENT
Start: 2019-01-30 | End: 2019-02-03 | Stop reason: HOSPADM

## 2019-01-30 RX ADMIN — METOPROLOL SUCCINATE 50 MG: 50 TABLET, EXTENDED RELEASE ORAL at 20:52

## 2019-01-30 RX ADMIN — PREDNISONE 40 MG: 20 TABLET ORAL at 08:00

## 2019-01-30 RX ADMIN — OMEPRAZOLE 40 MG: 20 CAPSULE, DELAYED RELEASE ORAL at 07:57

## 2019-01-30 RX ADMIN — INSULIN ASPART 1 UNITS: 100 INJECTION, SOLUTION INTRAVENOUS; SUBCUTANEOUS at 21:57

## 2019-01-30 RX ADMIN — IPRATROPIUM BROMIDE AND ALBUTEROL SULFATE 3 ML: .5; 2.5 SOLUTION RESPIRATORY (INHALATION) at 23:13

## 2019-01-30 RX ADMIN — LEVOTHYROXINE SODIUM 100 MCG: 100 TABLET ORAL at 07:58

## 2019-01-30 RX ADMIN — CLONIDINE HYDROCHLORIDE 0.1 MG: 0.1 TABLET ORAL at 21:56

## 2019-01-30 RX ADMIN — LEVOFLOXACIN 500 MG: 500 TABLET, FILM COATED ORAL at 17:23

## 2019-01-30 RX ADMIN — IPRATROPIUM BROMIDE AND ALBUTEROL SULFATE 3 ML: .5; 3 SOLUTION RESPIRATORY (INHALATION) at 00:34

## 2019-01-30 RX ADMIN — GUAIFENESIN 600 MG: 600 TABLET, EXTENDED RELEASE ORAL at 08:00

## 2019-01-30 RX ADMIN — IPRATROPIUM BROMIDE AND ALBUTEROL SULFATE 3 ML: .5; 3 SOLUTION RESPIRATORY (INHALATION) at 03:08

## 2019-01-30 RX ADMIN — SENNOSIDES AND DOCUSATE SODIUM 1 TABLET: 8.6; 5 TABLET ORAL at 07:58

## 2019-01-30 RX ADMIN — INSULIN GLARGINE 15 UNITS: 100 INJECTION, SOLUTION SUBCUTANEOUS at 21:57

## 2019-01-30 RX ADMIN — LORAZEPAM 0.5 MG: 2 INJECTION INTRAMUSCULAR; INTRAVENOUS at 21:56

## 2019-01-30 RX ADMIN — GLIPIZIDE 2.5 MG: 5 TABLET ORAL at 17:23

## 2019-01-30 RX ADMIN — SENNOSIDES AND DOCUSATE SODIUM 1 TABLET: 8.6; 5 TABLET ORAL at 20:52

## 2019-01-30 RX ADMIN — LOSARTAN POTASSIUM 50 MG: 50 TABLET, FILM COATED ORAL at 07:58

## 2019-01-30 RX ADMIN — LOSARTAN POTASSIUM 50 MG: 50 TABLET, FILM COATED ORAL at 20:52

## 2019-01-30 RX ADMIN — LORAZEPAM 0.5 MG: 2 INJECTION INTRAMUSCULAR; INTRAVENOUS at 17:23

## 2019-01-30 RX ADMIN — DEXTROSE 15 G: 15 GEL ORAL at 01:58

## 2019-01-30 RX ADMIN — IPRATROPIUM BROMIDE AND ALBUTEROL SULFATE 3 ML: .5; 3 SOLUTION RESPIRATORY (INHALATION) at 15:34

## 2019-01-30 RX ADMIN — CITALOPRAM HYDROBROMIDE 20 MG: 20 TABLET ORAL at 07:58

## 2019-01-30 RX ADMIN — GUAIFENESIN 1200 MG: 600 TABLET, EXTENDED RELEASE ORAL at 20:52

## 2019-01-30 RX ADMIN — ALBUTEROL SULFATE 2.5 MG: 2.5 SOLUTION RESPIRATORY (INHALATION) at 05:20

## 2019-01-30 RX ADMIN — IPRATROPIUM BROMIDE AND ALBUTEROL SULFATE 3 ML: .5; 3 SOLUTION RESPIRATORY (INHALATION) at 11:29

## 2019-01-30 RX ADMIN — AZITHROMYCIN MONOHYDRATE 250 MG: 250 TABLET ORAL at 08:00

## 2019-01-30 RX ADMIN — IPRATROPIUM BROMIDE AND ALBUTEROL SULFATE 3 ML: .5; 2.5 SOLUTION RESPIRATORY (INHALATION) at 19:35

## 2019-01-30 RX ADMIN — GLIPIZIDE 2.5 MG: 5 TABLET ORAL at 07:58

## 2019-01-30 ASSESSMENT — ACTIVITIES OF DAILY LIVING (ADL)
ADLS_ACUITY_SCORE: 19
ADLS_ACUITY_SCORE: 17
ADLS_ACUITY_SCORE: 19
ADLS_ACUITY_SCORE: 19
ADLS_ACUITY_SCORE: 16
ADLS_ACUITY_SCORE: 16

## 2019-01-30 NOTE — PROGRESS NOTES
[facility]    Medicine Progress Note - Hospitalist Service       Date of Admission:  1/26/2019  Date of Service: 1/29/2019    Assessment & Plan   Aisha Fitzgerald is a 83 year old female with insulin dependent T2DM, HTN, COPD utilizing supplemental oxygen via NC at 1 LPM at night who was admitted on 1/26/2019 with worsening SOB, diagnosed with acute hypoxic respiratory failure in the setting of COPD exacerbation.      Acute on chronic hypoxic respiratory failure   COPD exacerbation  Acute Bronchitis   - PTA on 1L NC O2 at night at home  - still smokes 5 cigarettes/day  - admitted with worsening SOB  - on admission- leukocytosis ~14.5>16.1, procal 0.23, afebrile, troponin 0.21;  EKG unremarkable.   - CXR without infiltrate, mild atelectasis of R middle lobe again noted  - started on scheduled duonebs q4h while awake, Alb neb prn, iv Solumedrol, Zithromax; Mucinex 600 mg po BID  - IS and Acapella  - RCAT consulted, encourage pulmonary toilet  - Continue PTA Advair, hold PTA daliresp and Spiriva  - WBCs down from 14.5--16.1--11.9  - yesterday - was feeling better, weaned down to O2 1 liter  - switched to Prednisone 40 mg po daily starting today 1/30  - today she states her chest is tight, still with some chest congestion but not able to expectorate  - add acetylcysteine nebs q4h while awake; increase Mucinex to 1200mg po BID  - stop Zithromax and start Levaquin 50 mg po daily  - PT eval  - SW consult  - strongly encouraged to quit smoking  - Pulm follow up as outpatient     Nausea/vomiting, improved: Reported emesis (nonbloody, no coffee ground appearance) the day prior to admission with minimal oral intake of food or water for the last few days  - initially started on IVF, appetite improved  - stopped iv fluids     Hypochloremic hyponatremia, improved  Hypokalemia, resolved:  - has some degree on chronic hyponatremia; Na low Na was low  131--133 in 2015  - gastric losses due to vomiting and poor po  intake likely contributing  - Na on admission 128; started on iv fluids; Na improved to 133--130--130 stable  - also chloride 91-->101, potassium 3.1-->3.9-->4.7.   - off iv fluids     T2DM, insulin dependent: A1C 7.6 on 1/26/19  [ PTA glipizide 2.5 mg po BID and Lantus 18 U at bedtime]  - PTA Glipizide resumed on admission  - initially started on Lantus 15 units at bedtime; BS were elevated (expected to be elevated though, because she was on iv steroids) so Lantus was increased to 18 units at bedtime on 1/27  - now BS are in 80-90's (she is on po Prednisone now); will decrease Lantus to 15 units qHS  - Medium sliding scale insulin   - Monitor for hypoglycemia     Recent Labs   Lab 01/30/19  1218 01/30/19  0747 01/30/19  0226 01/30/19  0152 01/29/19  2017 01/29/19  1655  01/29/19  0719  01/28/19  0755  01/27/19  0621  01/26/19  0815   GLC  --   --   --   --   --   --   --  135*  --  177*  --  185*  --  205*   BGM 92 84 113* 60* 179* 230*   < >  --    < >  --    < >  --    < >  --     < > = values in this interval not displayed.      HTN  [PTA Losartan 50 mg po qam and 25 mg po qm, clonidine 0.1 mg po qhs, Toprol XL 50 mg po qevening]  - BP meds resumed on admission  - had elevated BP's on 1/28 evening with SBP in 180-200's  - increased Losartan to 50 mg po BID; continue PTA Clonidine and Toprol XL  - PRN hydralazine available for SBP>160   - monitor BPs    Hypothyroidism  - continue PTA Synthroid    Tobacco use disorder  - reports smoking 5 cigarettes/day  - strongly encouraged to quit smoking  - Nicotine gum prn     Diet: Combination Diet 4463-4698 Calories: Moderate Consistent CHO (4-6 CHO units/meal); Low Saturated Fat Na <2400mg Diet  Room Service    DVT Prophylaxis: Ambulate every shift  Platt Catheter: not present  Code Status: Full Code    Disposition Plan   Expected discharge: 1-2 days, recommended to prior living arrangement vs TCU once clinically better.  Entered: Margarita Mcwilliams MD 01/30/2019, 4:32  PM         Margarita Mcwilliams MD  Hospitalist Service  [facility]  ____________________________________________________________________    Interval History    Still with chest tightness, not able to expectorate; denies chest pain, no N/V, no abd pain; discussed with bedside RN    Data reviewed today: See below    Physical Exam   Vital Signs: Temp: 98.1  F (36.7  C) Temp src: Oral BP: 185/67 Pulse: 80 Heart Rate: 76 Resp: 18 SpO2: 97 % O2 Device: Nasal cannula Oxygen Delivery: 1 LPM  Weight: 145 lbs 14.4 oz    CONSTITUTIONAL: Awake, alert, mildly anxious  HEENT: Normocephalic, atraumatic. PERRL  CARDIOVASCULAR: S1S2, RRR, no murmurs, rubs  RESPIRATORY: diminished air entry bilateral with intermitted coarse breath sounds; nonproductive cough noted  GASTROINTESTINAL:  Abdomen soft, non-distended, nontender, BS present.  MUSCULOSKELETAL: No gross deformities noted. Normal muscle tone.   Extremities- no lower extremity edema, bilaterally.  NEUROLOGIC:AAOX3, no FNDs  SKIN: Warm, dry, intact. No jaundice noted.     Data   Recent Labs   Lab 01/29/19  0719 01/28/19  0755 01/27/19  0621  01/26/19  1133 01/26/19  0815   WBC  --  11.9* 16.1*  --   --  14.5*   HGB  --  11.8 12.3  --   --  13.5   MCV  --  86 85  --   --  84   PLT  --  228 228  --   --  228   * 130* 133  --   --  128*   POTASSIUM 4.7 4.2 3.9   < >  --  3.1*   CHLORIDE 98 99 101  --   --  91*   CO2 27 26 25  --   --  25   BUN 25 25 17  --   --  14   CR 0.62 0.62 0.54  --   --  0.61   ANIONGAP 5 5 7  --   --  12   YASMEEN 9.0 9.2 9.0  --   --  9.3   * 177* 185*  --   --  205*   TROPI  --   --   --   --  0.021 0.021    < > = values in this interval not displayed.     No results found for this or any previous visit (from the past 24 hour(s)).  Medications       acetylcysteine  4 mL Nebulization Q4H     azithromycin  250 mg Oral Daily     citalopram  20 mg Oral Daily     cloNIDine  0.1 mg Oral At Bedtime     fluticasone-salmeterol  1 puff  Inhalation BID     glipiZIDE  2.5 mg Oral BID     guaiFENesin  1,200 mg Oral BID     insulin aspart  1-7 Units Subcutaneous TID AC     insulin aspart  1-5 Units Subcutaneous At Bedtime     insulin glargine  18 Units Subcutaneous At Bedtime     ipratropium - albuterol 0.5 mg/2.5 mg/3 mL  3 mL Nebulization Q4H     levothyroxine  100 mcg Oral Daily     losartan  50 mg Oral BID     metoprolol succinate ER  50 mg Oral Daily at 8 pm     omeprazole  40 mg Oral QAM     predniSONE  40 mg Oral Daily     senna-docusate  1 tablet Oral BID    Or     senna-docusate  2 tablet Oral BID

## 2019-01-30 NOTE — PLAN OF CARE
7a-7p  Pt is A&Ox4, VSS on 1L O2 NC baseline. Denies pain. Can be anxious at times, PRN ativan given x 1. LS coarse crackles and expiratory wheezes, scheduled nebs and inhalers. Loose productive cough, sputum culture cancelled per lab- no repeat needed per MD. IS completed several times today and continue encouraging as well as acapella. Wound on LLE covered with mepilex, CDI. Tolerating mod carb diet, BG checks with sliding scale insulin as needed checks 84, 92, 155. Started on PO Levaquin this evening. PIV SL. Up with SBA + cane to bathroom. Plan to discharge pending respiratory improvement and PT consult. Will continue to monitor.

## 2019-01-30 NOTE — PLAN OF CARE
Pt A&Ox4, anxiety improved. VSS on 1L NC, HTN but no need for PRN hydralazine at this time. Denies pain. LS coarse, exp wheezes, scheduled nebs. Frequent loose cough, unable to obtain sputum sample. Continued to encourage IS, poor tolerance. Denies SOB at rest, but labored at times. Small wound on LLE from mole removal, mepilex in place. Fair appetite, continuing mod carb diet and cardiac diet. PIV SL. BG checks with insulin coverage. Up SBA to bathroom, voiding adequately. PT consulted, discharge pending. Nursing to continue to monitor.

## 2019-01-30 NOTE — PROGRESS NOTES
Pt is on 1 L NC with SpO2 94-96%. Breath sounds were coarse, crackles.  All nebs were given as ordered by MD, IS and acapella were also encouraged to do at the time of neb treatment. RT will continue to monitor and follow.     Brianne Del Rio, RRT  1/29/2019 9:44 PM

## 2019-01-30 NOTE — PLAN OF CARE
A/Ox4. BP slightly elevated, on 1L oxygen per NC. Other VSS. Lung sounds coarse crackles, expiratory wheezes. Labored and pursed lip breathing at times. Loose and congested cough, sputum sample sent down this morning. Incentive spirometer to 750. Scheduled nebs given, PRN neb given x1 per RT. Mod carb/cardiac diet. PIV S/L. BG at 0200 was 60, 15g glucose gel given, improved to 113. Up with SBA/cane. Working with PT. Continue to monitor

## 2019-01-31 ENCOUNTER — APPOINTMENT (OUTPATIENT)
Dept: PHYSICAL THERAPY | Facility: CLINIC | Age: 84
DRG: 190 | End: 2019-01-31
Payer: MEDICARE

## 2019-01-31 LAB
GLUCOSE BLDC GLUCOMTR-MCNC: 101 MG/DL (ref 70–99)
GLUCOSE BLDC GLUCOMTR-MCNC: 145 MG/DL (ref 70–99)
GLUCOSE BLDC GLUCOMTR-MCNC: 233 MG/DL (ref 70–99)
GLUCOSE BLDC GLUCOMTR-MCNC: 248 MG/DL (ref 70–99)
GLUCOSE BLDC GLUCOMTR-MCNC: 89 MG/DL (ref 70–99)

## 2019-01-31 PROCEDURE — 12000000 ZZH R&B MED SURG/OB

## 2019-01-31 PROCEDURE — 25000132 ZZH RX MED GY IP 250 OP 250 PS 637: Mod: GY | Performed by: HOSPITALIST

## 2019-01-31 PROCEDURE — 40000275 ZZH STATISTIC RCP TIME EA 10 MIN

## 2019-01-31 PROCEDURE — 97116 GAIT TRAINING THERAPY: CPT | Mod: GP

## 2019-01-31 PROCEDURE — 94640 AIRWAY INHALATION TREATMENT: CPT | Mod: 76

## 2019-01-31 PROCEDURE — 25000132 ZZH RX MED GY IP 250 OP 250 PS 637: Mod: GY | Performed by: PHYSICIAN ASSISTANT

## 2019-01-31 PROCEDURE — 25000125 ZZHC RX 250: Performed by: INTERNAL MEDICINE

## 2019-01-31 PROCEDURE — 25000132 ZZH RX MED GY IP 250 OP 250 PS 637: Mod: GY | Performed by: INTERNAL MEDICINE

## 2019-01-31 PROCEDURE — 00000146 ZZHCL STATISTIC GLUCOSE BY METER IP

## 2019-01-31 PROCEDURE — A9270 NON-COVERED ITEM OR SERVICE: HCPCS | Mod: GY | Performed by: HOSPITALIST

## 2019-01-31 PROCEDURE — 40000193 ZZH STATISTIC PT WARD VISIT

## 2019-01-31 PROCEDURE — 99232 SBSQ HOSP IP/OBS MODERATE 35: CPT | Performed by: INTERNAL MEDICINE

## 2019-01-31 PROCEDURE — 25000128 H RX IP 250 OP 636: Performed by: INTERNAL MEDICINE

## 2019-01-31 PROCEDURE — 97530 THERAPEUTIC ACTIVITIES: CPT | Mod: GP

## 2019-01-31 PROCEDURE — 94640 AIRWAY INHALATION TREATMENT: CPT

## 2019-01-31 PROCEDURE — 97110 THERAPEUTIC EXERCISES: CPT | Mod: GP

## 2019-01-31 PROCEDURE — A9270 NON-COVERED ITEM OR SERVICE: HCPCS | Mod: GY | Performed by: PHYSICIAN ASSISTANT

## 2019-01-31 PROCEDURE — 25000131 ZZH RX MED GY IP 250 OP 636 PS 637: Mod: GY | Performed by: INTERNAL MEDICINE

## 2019-01-31 PROCEDURE — A9270 NON-COVERED ITEM OR SERVICE: HCPCS | Mod: GY | Performed by: INTERNAL MEDICINE

## 2019-01-31 RX ADMIN — OMEPRAZOLE 40 MG: 20 CAPSULE, DELAYED RELEASE ORAL at 09:15

## 2019-01-31 RX ADMIN — CITALOPRAM HYDROBROMIDE 20 MG: 20 TABLET ORAL at 09:16

## 2019-01-31 RX ADMIN — INSULIN GLARGINE 15 UNITS: 100 INJECTION, SOLUTION SUBCUTANEOUS at 21:51

## 2019-01-31 RX ADMIN — IPRATROPIUM BROMIDE AND ALBUTEROL SULFATE 3 ML: .5; 2.5 SOLUTION RESPIRATORY (INHALATION) at 07:36

## 2019-01-31 RX ADMIN — ACETYLCYSTEINE 4 ML: 200 SOLUTION ORAL; RESPIRATORY (INHALATION) at 11:10

## 2019-01-31 RX ADMIN — GUAIFENESIN 1200 MG: 600 TABLET, EXTENDED RELEASE ORAL at 20:31

## 2019-01-31 RX ADMIN — INSULIN ASPART 1 UNITS: 100 INJECTION, SOLUTION INTRAVENOUS; SUBCUTANEOUS at 21:50

## 2019-01-31 RX ADMIN — GUAIFENESIN 1200 MG: 600 TABLET, EXTENDED RELEASE ORAL at 09:15

## 2019-01-31 RX ADMIN — ACETYLCYSTEINE 4 ML: 200 SOLUTION ORAL; RESPIRATORY (INHALATION) at 15:32

## 2019-01-31 RX ADMIN — CLONIDINE HYDROCHLORIDE 0.1 MG: 0.1 TABLET ORAL at 21:57

## 2019-01-31 RX ADMIN — IPRATROPIUM BROMIDE AND ALBUTEROL SULFATE 3 ML: .5; 2.5 SOLUTION RESPIRATORY (INHALATION) at 23:25

## 2019-01-31 RX ADMIN — GLIPIZIDE 2.5 MG: 5 TABLET ORAL at 18:24

## 2019-01-31 RX ADMIN — LOSARTAN POTASSIUM 50 MG: 50 TABLET, FILM COATED ORAL at 09:16

## 2019-01-31 RX ADMIN — LORAZEPAM 0.5 MG: 2 INJECTION INTRAMUSCULAR; INTRAVENOUS at 22:05

## 2019-01-31 RX ADMIN — PREDNISONE 40 MG: 20 TABLET ORAL at 09:15

## 2019-01-31 RX ADMIN — METOPROLOL SUCCINATE 75 MG: 50 TABLET, EXTENDED RELEASE ORAL at 20:32

## 2019-01-31 RX ADMIN — IPRATROPIUM BROMIDE AND ALBUTEROL SULFATE 3 ML: .5; 2.5 SOLUTION RESPIRATORY (INHALATION) at 15:32

## 2019-01-31 RX ADMIN — LEVOFLOXACIN 500 MG: 500 TABLET, FILM COATED ORAL at 09:15

## 2019-01-31 RX ADMIN — ACETYLCYSTEINE 4 ML: 200 SOLUTION ORAL; RESPIRATORY (INHALATION) at 07:36

## 2019-01-31 RX ADMIN — LOSARTAN POTASSIUM 50 MG: 50 TABLET, FILM COATED ORAL at 20:32

## 2019-01-31 RX ADMIN — GLIPIZIDE 2.5 MG: 5 TABLET ORAL at 09:16

## 2019-01-31 RX ADMIN — SENNOSIDES AND DOCUSATE SODIUM 1 TABLET: 8.6; 5 TABLET ORAL at 09:15

## 2019-01-31 RX ADMIN — IPRATROPIUM BROMIDE AND ALBUTEROL SULFATE 3 ML: .5; 2.5 SOLUTION RESPIRATORY (INHALATION) at 11:10

## 2019-01-31 RX ADMIN — ACETYLCYSTEINE 4 ML: 200 SOLUTION ORAL; RESPIRATORY (INHALATION) at 19:38

## 2019-01-31 RX ADMIN — LEVOTHYROXINE SODIUM 100 MCG: 100 TABLET ORAL at 09:16

## 2019-01-31 RX ADMIN — SENNOSIDES AND DOCUSATE SODIUM 2 TABLET: 8.6; 5 TABLET ORAL at 20:32

## 2019-01-31 RX ADMIN — IPRATROPIUM BROMIDE AND ALBUTEROL SULFATE 3 ML: .5; 2.5 SOLUTION RESPIRATORY (INHALATION) at 19:38

## 2019-01-31 RX ADMIN — HYDRALAZINE HYDROCHLORIDE 20 MG: 20 INJECTION INTRAMUSCULAR; INTRAVENOUS at 23:51

## 2019-01-31 ASSESSMENT — ACTIVITIES OF DAILY LIVING (ADL)
ADLS_ACUITY_SCORE: 19
ADLS_ACUITY_SCORE: 18
ADLS_ACUITY_SCORE: 19

## 2019-01-31 NOTE — PROGRESS NOTES
[facility]    Medicine Progress Note - Hospitalist Service       Date of Admission:  1/26/2019  Date of Service: 1/29/2019    Assessment & Plan   Aisha Fitzgerald is a 83 year old female with insulin dependent T2DM, HTN, COPD utilizing supplemental oxygen via NC at 1 LPM at night who was admitted on 1/26/2019 with worsening SOB, diagnosed with acute hypoxic respiratory failure in the setting of COPD exacerbation.      Acute on chronic hypoxic respiratory failure   COPD exacerbation  Acute Bronchitis   - PTA on 1L NC O2 at night at home  - still smokes 5 cigarettes/day  - admitted with worsening SOB  - on admission- leukocytosis ~14.5>16.1, procal 0.23, afebrile, troponin 0.21;  EKG unremarkable.   - CXR without infiltrate, mild atelectasis of R middle lobe again noted  - started on scheduled duonebs q4h while awake, Alb neb prn, iv Solumedrol, Zithromax; Mucinex 600 mg po BID  - IS and Acapella  - RCAT consulted, encourage pulmonary toilet  - Continue PTA Advair, hold PTA daliresp and Spiriva  - WBCs down from 14.5--16.1--11.9  - switched to Prednisone 40 mg po daily starting today 1/30  - improved but very slow  - on 1/30- added acetylcysteine nebs q4h while awake; increased Mucinex to 1200mg po BID; stopped Zithromax and started Levaquin 50 mg po daily  - still not able to expectorate too much  - currently on O2 1 liter; try to wean off O2 during the day if able  - PT eval- rec TCU and she agrees with the plan  - SW consult  - strongly encouraged to quit smoking  - Pulm follow up as outpatient     Nausea/vomiting- resolved  - on admission- reported emesis (nonbloody, no coffee ground appearance) the day prior to admission with minimal oral intake of food or water for the last few days  - initially started on IVF, appetite improved  - stopped iv fluids     Hypochloremic hyponatremia, improved  Hypokalemia, resolved:  - has some degree on chronic hyponatremia; Na low Na was low  131--133 in 2015  -  gastric losses due to vomiting and poor po intake likely contributing  - Na on admission 128; started on iv fluids; Na improved to 133--130--130 stable  - also chloride 91-->101, potassium 3.1-->3.9-->4.7.   - off iv fluids     T2DM, insulin dependent: A1C 7.6 on 1/26/19  [ PTA glipizide 2.5 mg po BID and Lantus 18 U at bedtime]  - PTA Glipizide resumed on admission  - initially started on Lantus 15 units at bedtime; BS were elevated (expected to be elevated though, because she was on iv steroids) so Lantus was increased to 18 units at bedtime on 1/27  - on 1/30-  BS were in 80-90's (she is on po Prednisone now); decreased Lantus to 15 units at bedtime  - BS now 101--145  - Medium sliding scale insulin   - Monitor for hypoglycemia     Recent Labs   Lab 01/31/19  1200 01/31/19  0837 01/31/19  0200 01/30/19  2134 01/30/19  1729 01/30/19  1218  01/29/19  0719  01/28/19  0755  01/27/19  0621  01/26/19  0815   GLC  --   --   --   --   --   --   --  135*  --  177*  --  185*  --  205*   * 89 101* 201* 155* 92   < >  --    < >  --    < >  --    < >  --     < > = values in this interval not displayed.      HTN  [PTA Losartan 50 mg po qam and 25 mg po qm, clonidine 0.1 mg po qhs, Toprol XL 50 mg po qevening]  - BP meds resumed on admission  - intermittently elevated BPs  - 1/30- increased Losartan to 50 mg po BID  - BP still elevated at times  - will increase PTA Toprol to 75 mg po daily; continue PTA Clonidine   - PRN hydralazine available for SBP>160   - monitor BPs    Hypothyroidism  - continue PTA Synthroid    Tobacco use disorder  - reports smoking 5 cigarettes/day  - strongly encouraged to quit smoking and she agrees with the plan  - Nicotine gum prn     Diet: Combination Diet 7406-2527 Calories: Moderate Consistent CHO (4-6 CHO units/meal); Low Saturated Fat Na <2400mg Diet  Room Service    DVT Prophylaxis: Ambulate every shift  Platt Catheter: not present  Code Status: Full Code    Disposition Plan   Expected  discharge: 1-2 days, recommended to transitional care unit once clinically better.  Entered: Margarita Mcwilliams MD 01/31/2019, 3:15 PM         Margarita Mcwilliams MD  Hospitalist Service  [facility]  ____________________________________________________________________    Interval History    Feels weak; breathing is better; denies chest pain, still with some intermittent nonproductive cough, no N/V, no abd pain; appreciative of the care and willing to quit smoking; discussed with bedside RN and SW    Data reviewed today: See below    Physical Exam   Vital Signs: Temp: 99.4  F (37.4  C) Temp src: Oral BP: 181/69 Pulse: 80 Heart Rate: 76 Resp: 18 SpO2: 93 % O2 Device: Nasal cannula Oxygen Delivery: 1 LPM  Weight: 145 lbs 14.4 oz    CONSTITUTIONAL: Awake, alert, NAD  HEENT: Normocephalic, atraumatic. PERRL  CARDIOVASCULAR: S1S2, RRR, no murmurs, rubs  RESPIRATORY: diminished air entry bilateral with intermitted coarse breath sounds;   GASTROINTESTINAL:  Abdomen soft, non-distended, nontender, BS present.  MUSCULOSKELETAL: No gross deformities noted. Normal muscle tone.   Extremities- no lower extremity edema, bilaterally.  NEUROLOGIC:AAOX3, no FNDs  SKIN: Warm, dry, intact. No jaundice noted.     Data   Recent Labs   Lab 01/29/19  0719 01/28/19  0755 01/27/19  0621  01/26/19  1133 01/26/19  0815   WBC  --  11.9* 16.1*  --   --  14.5*   HGB  --  11.8 12.3  --   --  13.5   MCV  --  86 85  --   --  84   PLT  --  228 228  --   --  228   * 130* 133  --   --  128*   POTASSIUM 4.7 4.2 3.9   < >  --  3.1*   CHLORIDE 98 99 101  --   --  91*   CO2 27 26 25  --   --  25   BUN 25 25 17  --   --  14   CR 0.62 0.62 0.54  --   --  0.61   ANIONGAP 5 5 7  --   --  12   YASMEEN 9.0 9.2 9.0  --   --  9.3   * 177* 185*  --   --  205*   TROPI  --   --   --   --  0.021 0.021    < > = values in this interval not displayed.     No results found for this or any previous visit (from the past 24 hour(s)).  Medications        acetylcysteine  4 mL Nebulization Q4H WA     citalopram  20 mg Oral Daily     cloNIDine  0.1 mg Oral At Bedtime     fluticasone-salmeterol  1 puff Inhalation BID     glipiZIDE  2.5 mg Oral BID     guaiFENesin  1,200 mg Oral BID     insulin aspart  1-7 Units Subcutaneous TID AC     insulin aspart  1-5 Units Subcutaneous At Bedtime     insulin glargine  15 Units Subcutaneous At Bedtime     ipratropium - albuterol 0.5 mg/2.5 mg/3 mL  3 mL Nebulization Q4H While awake     levofloxacin  500 mg Oral Daily     levothyroxine  100 mcg Oral Daily     losartan  50 mg Oral BID     metoprolol succinate ER  75 mg Oral Daily at 8 pm     omeprazole  40 mg Oral QAM     predniSONE  40 mg Oral Daily     senna-docusate  1 tablet Oral BID    Or     senna-docusate  2 tablet Oral BID

## 2019-01-31 NOTE — PROGRESS NOTES
01/30/19 1000   Quick Adds   Type of Visit Initial PT Evaluation   Living Environment   Lives With alone   Home Accessibility stairs to enter home;other (see comments)  (3 steps to enter B rail, tub shower)   Living Environment Comment All needs met on main level except laundry in basement, pt states she can send out laundry if needed.   Self-Care   Usual Activity Tolerance moderate   Current Activity Tolerance fair   Equipment Currently Used at Home cane, straight;grab bar, toilet;grab bar, tub/shower;raised toilet  (Pt states she has FWW in garage, does not use, 1L O2 at nig )   Functional Level Prior   Ambulation 1-->assistive equipment  (straight cane for amb outside home)   Transferring 0-->independent   Toileting 1-->assistive equipment  (grab bar, elevated seat)   Bathing 1-->assistive equipment  (grab bar to enter tub shower)   Fall history within last six months no   Which of the above functional risks had a recent onset or change? ambulation;transferring   Prior Functional Level Comment Pt previously ind/mod I with mobility and ADLs/IADLs   General Information   Onset of Illness/Injury or Date of Surgery - Date 01/26/19   Referring Physician Margarita Mcwilliams MD   Patient/Family Goals Statement to go home   Pertinent History of Current Problem (include personal factors and/or comorbidities that impact the POC) Pt is 83 y.o. F admitted for COPD exacerbation, shortness of breath. PMH sig for HTN, DM. Uses 1 L O2 via NC overnight at baseline.   Precautions/Limitations fall precautions   General Observations Pt is pleasant and agreeable to PT   General Info Comments Activity: Up with assist   Cognitive Status Examination   Orientation orientation to person, place and time   Level of Consciousness alert   Follows Commands and Answers Questions 100% of the time;able to follow multistep instructions   Integumentary/Edema   Integumentary/Edema Comments dressing noted on lower leg, appears intact   Posture   "  Posture Forward head position;Protracted shoulders   Range of Motion (ROM)   ROM Comment ROM grossly WFL   Strength   Strength Comments LE strength grossly 4/5   Bed Mobility   Bed Mobility Comments Supervision rolling, sup>sit   Transfer Skills   Transfer Comments SBA sit<>stand   Gait   Gait Comments Ambulated 20' with FWW and CGA with 2L O2 via NC, uneven weight shift.   Balance   Balance Comments good balance with B UE support on FWW, unsupported balance impaired   Sensory Examination   Sensory Perception Comments denies numbness/tingling   Coordination   Coordination Comments grossly intact   General Therapy Interventions   Planned Therapy Interventions balance training;bed mobility training;gait training;strengthening;transfer training;progressive activity/exercise   Clinical Impression   Criteria for Skilled Therapeutic Intervention yes, treatment indicated   PT Diagnosis Impaired gait   Influenced by the following impairments Impaired balance, decreased activity tolerance, weakness   Functional limitations due to impairments impaired gait, transfers, and bed mobility   Clinical Presentation Stable/Uncomplicated   Clinical Presentation Rationale see above   Clinical Decision Making (Complexity) Low complexity   Therapy Frequency` 5 times/week   Predicted Duration of Therapy Intervention (days/wks) 4   Anticipated Discharge Disposition Transitional Care Facility   Risk & Benefits of therapy have been explained Yes   Patient, Family & other staff in agreement with plan of care Yes   Clinical Impression Comments Pt would benefit from continued skilled therapy to progress toward PLOF.   [facility] [employer] TM \"6 Clicks\"   2016, Trustees of [facility], under license to [employer].  All rights reserved.   6 Clicks Short Forms Basic Mobility Inpatient Short Form   [facility] [employer]  \"6 Clicks\" V.2 Basic Mobility Inpatient Short Form   1. Turning from your back to your side while in a flat bed " without using bedrails? 3 - A Little   2. Moving from lying on your back to sitting on the side of a flat bed without using bedrails? 3 - A Little   3. Moving to and from a bed to a chair (including a wheelchair)? 3 - A Little   4. Standing up from a chair using your arms (e.g., wheelchair, or bedside chair)? 3 - A Little   5. To walk in hospital room? 3 - A Little   6. Climbing 3-5 steps with a railing? 2 - A Lot   Basic Mobility Raw Score (Score out of 24.Lower scores equate to lower levels of function) 17   Total Evaluation Time   Total Evaluation Time (Minutes) 15

## 2019-01-31 NOTE — PROGRESS NOTES
D: SERGIO following for discharge planning. SW reviewed chart. PT recommends TCU.   I: SW met with pt. She is agreeable to TCU as she lives alone and has no additional help at home. She would like to return to [facility] in [facility] as she has been there before. She would also like a referral to [facility] as a second choice. She would like transportation arranged to get there and SW explained she will be billed for this.   P: Referrals sent via DOD.     DASH Gaitan, SW  [phone]

## 2019-01-31 NOTE — PLAN OF CARE
Discharge Planner PT   Patient plan for discharge: not stated  Current status: Pt rec supine in bed receiving 1L O2 via NC , agreeable to PT . Mod I supine>sit with HOB elevated, SBA sit>stand with FWW with CGA/Min A to control descent stand>sit. Pt amb 1x75',1x125' with FWW and 1 L O2, seated rest between. Cues for upright posture. Gait devations include mod trendelenberg with R trunk lean, uneven weight shift. Gait symmetry, step length, and stability improved with FWW> cane. PT advised pt to continue using FWW rather than cane at this time. Pt performed seated SAQ x10 each against manual resistance and standing ex/balance training with CGA on RA, VSS. Pt seated in chair with chair alarm on, feet propped on 2nd chair, all needs in reach upon departure of PT.    Barriers to return to prior living situation: Current level of assist, decreased activity tolerance, decreased endurance, impaired balance, fall risk, lives alone.  Recommendations for discharge: TCU  Rationale for recommendations: Pt would benefit from continued skilled therapy to improve balance, gait, and strength in order to progress toward baseline before returning home.         Entered by: Tanya Main 01/31/2019 2:17 PM

## 2019-01-31 NOTE — PLAN OF CARE
Pt A&Ox4.  VSS on 1L oxygen via NC, HTN at times.  Denies pain.  CMS intact.  Up w/SBA and cane.  Voiding adequately.  PIV SL.  BG = 201 at HS and 101 at 0200.  Lung sounds w/coarse crackles and expiratory wheezes.  Mod carb cardiac diet.  ZAZUETA.  Anxious at times, Ativan available.  Mepilex on L shin CDI.  Nursing to continue to monitor.

## 2019-01-31 NOTE — PLAN OF CARE
Discharge Planner PT   Patient plan for discharge: not stated  Current status: PT orders rec, eval completed. Pt is 83 y.o. F admitted for COPD exacerbation, shortness of breath. PMH sig for HTN, DM. Uses 1 L O2 via NC overnight at baseline. Pt lives alone in home with 3 steps to enter with B rail, all needs met on main level, tub shower in bathroom. Pt states she has friends and family in the area. Previously ind/ mod I with straight cane for mobility and ADLs.  Presently pt rec 2L O2 via NC-SBA  bed mobility, CGA transfers, CGA amb 20' with FWW, VSS. Trialed amb on RA with cane, req Shahram and use of rail for support in sorto, short of breath post gait with SpO2 86, rebounding quickly to 96 with seated rest and oxygen back on. Participated in seated exercises x10 ea with manual resistance.     Barriers to return to prior living situation: Current level of assist, decreased activity tolerance, decreased endurance, impaired balance, fall risk, lives alone.  Recommendations for discharge: TCU  Rationale for recommendations: Pt would benefit from continued skilled therapy to improve balance, gait, and strength in order to progress toward baseline before returning home.       Entered by: Tanya Main 01/30/2019 7:04 PM

## 2019-02-01 ENCOUNTER — APPOINTMENT (OUTPATIENT)
Dept: PHYSICAL THERAPY | Facility: CLINIC | Age: 84
DRG: 190 | End: 2019-02-01
Payer: MEDICARE

## 2019-02-01 LAB
GLUCOSE BLDC GLUCOMTR-MCNC: 101 MG/DL (ref 70–99)
GLUCOSE BLDC GLUCOMTR-MCNC: 110 MG/DL (ref 70–99)
GLUCOSE BLDC GLUCOMTR-MCNC: 143 MG/DL (ref 70–99)
GLUCOSE BLDC GLUCOMTR-MCNC: 230 MG/DL (ref 70–99)
GLUCOSE BLDC GLUCOMTR-MCNC: 259 MG/DL (ref 70–99)

## 2019-02-01 PROCEDURE — 25000125 ZZHC RX 250: Performed by: INTERNAL MEDICINE

## 2019-02-01 PROCEDURE — 97116 GAIT TRAINING THERAPY: CPT | Mod: GP

## 2019-02-01 PROCEDURE — 40000193 ZZH STATISTIC PT WARD VISIT

## 2019-02-01 PROCEDURE — 94640 AIRWAY INHALATION TREATMENT: CPT

## 2019-02-01 PROCEDURE — 25000132 ZZH RX MED GY IP 250 OP 250 PS 637: Mod: GY | Performed by: HOSPITALIST

## 2019-02-01 PROCEDURE — 25000131 ZZH RX MED GY IP 250 OP 636 PS 637: Mod: GY | Performed by: INTERNAL MEDICINE

## 2019-02-01 PROCEDURE — A9270 NON-COVERED ITEM OR SERVICE: HCPCS | Mod: GY | Performed by: INTERNAL MEDICINE

## 2019-02-01 PROCEDURE — 12000000 ZZH R&B MED SURG/OB

## 2019-02-01 PROCEDURE — A9270 NON-COVERED ITEM OR SERVICE: HCPCS | Mod: GY | Performed by: HOSPITALIST

## 2019-02-01 PROCEDURE — 94640 AIRWAY INHALATION TREATMENT: CPT | Mod: 76

## 2019-02-01 PROCEDURE — 00000146 ZZHCL STATISTIC GLUCOSE BY METER IP

## 2019-02-01 PROCEDURE — 40000275 ZZH STATISTIC RCP TIME EA 10 MIN

## 2019-02-01 PROCEDURE — 25000132 ZZH RX MED GY IP 250 OP 250 PS 637: Mod: GY | Performed by: PHYSICIAN ASSISTANT

## 2019-02-01 PROCEDURE — 97110 THERAPEUTIC EXERCISES: CPT | Mod: GP

## 2019-02-01 PROCEDURE — 25000132 ZZH RX MED GY IP 250 OP 250 PS 637: Mod: GY | Performed by: INTERNAL MEDICINE

## 2019-02-01 PROCEDURE — 25000128 H RX IP 250 OP 636: Performed by: INTERNAL MEDICINE

## 2019-02-01 PROCEDURE — A9270 NON-COVERED ITEM OR SERVICE: HCPCS | Mod: GY | Performed by: PHYSICIAN ASSISTANT

## 2019-02-01 PROCEDURE — 99232 SBSQ HOSP IP/OBS MODERATE 35: CPT | Performed by: INTERNAL MEDICINE

## 2019-02-01 RX ADMIN — OMEPRAZOLE 40 MG: 20 CAPSULE, DELAYED RELEASE ORAL at 08:47

## 2019-02-01 RX ADMIN — CITALOPRAM HYDROBROMIDE 20 MG: 20 TABLET ORAL at 08:46

## 2019-02-01 RX ADMIN — ACETYLCYSTEINE 4 ML: 200 SOLUTION ORAL; RESPIRATORY (INHALATION) at 07:08

## 2019-02-01 RX ADMIN — CLONIDINE HYDROCHLORIDE 0.1 MG: 0.1 TABLET ORAL at 21:36

## 2019-02-01 RX ADMIN — INSULIN GLARGINE 15 UNITS: 100 INJECTION, SOLUTION SUBCUTANEOUS at 21:36

## 2019-02-01 RX ADMIN — ACETYLCYSTEINE 4 ML: 200 SOLUTION ORAL; RESPIRATORY (INHALATION) at 15:59

## 2019-02-01 RX ADMIN — ACETYLCYSTEINE 4 ML: 200 SOLUTION ORAL; RESPIRATORY (INHALATION) at 11:02

## 2019-02-01 RX ADMIN — GLIPIZIDE 2.5 MG: 5 TABLET ORAL at 16:47

## 2019-02-01 RX ADMIN — SENNOSIDES AND DOCUSATE SODIUM 2 TABLET: 8.6; 5 TABLET ORAL at 08:44

## 2019-02-01 RX ADMIN — LEVOFLOXACIN 500 MG: 500 TABLET, FILM COATED ORAL at 08:44

## 2019-02-01 RX ADMIN — IPRATROPIUM BROMIDE AND ALBUTEROL SULFATE 3 ML: .5; 2.5 SOLUTION RESPIRATORY (INHALATION) at 15:59

## 2019-02-01 RX ADMIN — MELATONIN 3 MG: 3 TAB ORAL at 20:16

## 2019-02-01 RX ADMIN — PREDNISONE 40 MG: 20 TABLET ORAL at 08:46

## 2019-02-01 RX ADMIN — GUAIFENESIN 1200 MG: 600 TABLET, EXTENDED RELEASE ORAL at 08:47

## 2019-02-01 RX ADMIN — LORAZEPAM 0.5 MG: 2 INJECTION INTRAMUSCULAR; INTRAVENOUS at 20:19

## 2019-02-01 RX ADMIN — SENNOSIDES AND DOCUSATE SODIUM 2 TABLET: 8.6; 5 TABLET ORAL at 20:03

## 2019-02-01 RX ADMIN — GUAIFENESIN 1200 MG: 600 TABLET, EXTENDED RELEASE ORAL at 20:03

## 2019-02-01 RX ADMIN — METOPROLOL SUCCINATE 75 MG: 50 TABLET, EXTENDED RELEASE ORAL at 20:03

## 2019-02-01 RX ADMIN — INSULIN ASPART 1 UNITS: 100 INJECTION, SOLUTION INTRAVENOUS; SUBCUTANEOUS at 21:36

## 2019-02-01 RX ADMIN — IPRATROPIUM BROMIDE AND ALBUTEROL SULFATE 3 ML: .5; 2.5 SOLUTION RESPIRATORY (INHALATION) at 11:02

## 2019-02-01 RX ADMIN — IPRATROPIUM BROMIDE AND ALBUTEROL SULFATE 3 ML: .5; 2.5 SOLUTION RESPIRATORY (INHALATION) at 22:44

## 2019-02-01 RX ADMIN — IPRATROPIUM BROMIDE AND ALBUTEROL SULFATE 3 ML: .5; 2.5 SOLUTION RESPIRATORY (INHALATION) at 07:08

## 2019-02-01 RX ADMIN — LOSARTAN POTASSIUM 50 MG: 50 TABLET, FILM COATED ORAL at 08:47

## 2019-02-01 RX ADMIN — IPRATROPIUM BROMIDE AND ALBUTEROL SULFATE 3 ML: .5; 2.5 SOLUTION RESPIRATORY (INHALATION) at 19:17

## 2019-02-01 RX ADMIN — ACETYLCYSTEINE 4 ML: 200 SOLUTION ORAL; RESPIRATORY (INHALATION) at 19:17

## 2019-02-01 RX ADMIN — LEVOTHYROXINE SODIUM 100 MCG: 100 TABLET ORAL at 08:46

## 2019-02-01 RX ADMIN — LOSARTAN POTASSIUM 50 MG: 50 TABLET, FILM COATED ORAL at 20:03

## 2019-02-01 RX ADMIN — GLIPIZIDE 2.5 MG: 5 TABLET ORAL at 08:47

## 2019-02-01 ASSESSMENT — ACTIVITIES OF DAILY LIVING (ADL)
ADLS_ACUITY_SCORE: 19
ADLS_ACUITY_SCORE: 18
ADLS_ACUITY_SCORE: 19
ADLS_ACUITY_SCORE: 18

## 2019-02-01 ASSESSMENT — MIFFLIN-ST. JEOR: SCORE: 1095.01

## 2019-02-01 NOTE — PLAN OF CARE
Discharge Planner PT   Patient plan for discharge: not stated  Current status: Patient sleeping upon arrival, aroused with light touch; agreeable to therapy. Currently on 1L NC. Performed supine to sit with Mod I. Sit <> stand transfer to FWW completed with Min A; cues for controlled descent. Ambulated 100 ft x 2 with FWW and CGA; LOB x 1 which patient was able to self recover. 5 minute seated rest break between trials. Participated in standing activities with FWW and CGA. Patient c/o mild SOB with exercise. SpO2 during activity 94-96%.   Barriers to return to prior living situation: Current level of assist, decreased activity tolerance, decreased endurance, impaired balance, fall risk, lives alone.  Recommendations for discharge: TCU per plan established by the PT.  Rationale for recommendations: Pt would benefit from continued skilled therapy to improve balance, gait, and strength in order to progress toward baseline before returning home.   Entered by: Genny Jiang 02/01/2019 10:49 AM

## 2019-02-01 NOTE — PROGRESS NOTES
"BRIEF NUTRITION ASSESSMENT      REASON FOR ASSESSMENT:  LOS    NUTRITION HISTORY:  Visited with patient today. Reports regular diet at baseline with good appetite. Feels she has lost weight unintentionally over the past 1.5 years and in interested in preventing this once d/c'd.     Information from chart: Reported emesis PTA with minimal PO over the last few days. H/o skin cancer, COPD, type 2 diabetes, HTN     CURRENT DIET AND INTAKE:  Diet:  Moderate consistent carb               Appetite is \"getting better\" and she states she \"always feels hungry\"   Has enjoyed the cod, oatmeal and fruit from the cafeteria so far. When asked if she has ever taken Boost/Ensure in the past, pt exclaims \"I can I get that here?!\"    ANTHROPOMETRICS:  Height: 5' 3\"  Weight: 66.6 kg  BMI: 26.00 kg/m2  IBW:  52.3 kg   Weight Status: Overweight BMI 25-29.9  %IBW: 127%  Weight History: weight appears to be stable over the past 1 year   Wt Readings from Last 10 Encounters:   02/01/19 66.6 kg (146 lb 12.8 oz)   11/25/18 68.9 kg (152 lb)   11/02/17 69.4 kg (153 lb)   05/27/17 73.9 kg (163 lb)   07/03/15 78 kg (172 lb 0 oz)   07/02/15 75.9 kg (167 lb 5.3 oz)   01/28/13 81.4 kg (179 lb 7.3 oz)       LABS:  Labs noted  Recent Labs   Lab 02/01/19  1126 02/01/19  0805 02/01/19  0448 01/31/19  2104 01/31/19  1721 01/31/19  1200  01/29/19  0719  01/28/19  0755  01/27/19  0621  01/26/19  0815   GLC  --   --   --   --   --   --   --  135*  --  177*  --  185*  --  205*   * 101* 110* 248* 233* 145*   < >  --    < >  --    < >  --    < >  --     < > = values in this interval not displayed.     Lab Results   Component Value Date    A1C 7.6 01/26/2019    A1C 8.2 07/02/2015    A1C 6.3 01/12/2013    A1C 6.7 01/03/2013       MALNUTRITION:  Patient does not meet two of the following criteria necessary for diagnosing malnutrition: significant weight loss, reduced intake, subcutaneous fat loss, muscle loss or fluid retention    NUTRITION " INTERVENTION:  Nutrition Diagnosis:  No nutrition diagnosis at this time.    Implementation:  Nutrition Education: Per Provider order if indicated. Briefly discussed higher calorie/higher protein foods to snack on at home to preserve lean body mass  Medical Food Supplement: Chocolate Boost with all meals per request     FOLLOW UP/MONITORING:   Will re-evaluate in 7 - 10 days, or sooner, if re-consulted.        Lu Iraheta RD, LD  Clinical Dietitian

## 2019-02-01 NOTE — PROGRESS NOTES
[facility]    Medicine Progress Note - Hospitalist Service       Date of Admission:  1/26/2019  Date of Service: 1/29/2019    Assessment & Plan   Aisha Fitzgerald is a 83 year old female with insulin dependent T2DM, HTN, COPD utilizing supplemental oxygen via NC at 1 LPM at night who was admitted on 1/26/2019 with worsening SOB, diagnosed with acute hypoxic respiratory failure in the setting of COPD exacerbation.      Acute on chronic hypoxic respiratory failure   COPD exacerbation  Acute Bronchitis   - PTA on 1L NC O2 at night at home  - still smokes 5 cigarettes/day  - admitted with worsening SOB  - on admission- leukocytosis ~14.5>16.1, procal 0.23, afebrile, troponin 0.21;  EKG unremarkable.   - CXR without infiltrate, mild atelectasis of R middle lobe again noted  - started on scheduled duonebs q4h while awake, Alb neb prn, iv Solumedrol, Zithromax; Mucinex 600 mg po BID  - IS and Acapella  - RCAT consulted, encourage pulmonary toilet  - Continue PTA Advair, hold PTA daliresp and Spiriva  - WBCs down from 14.5--16.1--11.9  - switched to Prednisone 40 mg po daily starting 1/30  - improved but very slow  - on 1/30- added acetylcysteine nebs q4h while awake; increased Mucinex to 1200mg po BID; stopped Zithromax and started Levaquin 500 mg po daily  - still not able to expectorate too much  - currently on O2 1 liter; try to wean off O2 during the day if able  - PT eval- rec TCU and she agrees with the plan  - SW consult  - strongly encouraged to quit smoking  - Pulm follow up as outpatient     Nausea/vomiting- resolved  - on admission- reported emesis (nonbloody, no coffee ground appearance) the day prior to admission with minimal oral intake of food or water for the last few days  - initially started on IVF, appetite improved, off iv fluids     Hypochloremic hyponatremia, improved  Hypokalemia, resolved:  - has some degree on chronic hyponatremia; Na low Na was low  131--133 in 2015  - gastric  losses due to vomiting and poor po intake likely contributing  - Na on admission 128; started on iv fluids; Na improved to 133--130--130 stable  - also chloride 91-->101, potassium 3.1-->3.9-->4.7.   - off iv fluids     T2DM, insulin dependent: A1C 7.6 on 1/26/19  [ PTA glipizide 2.5 mg po BID and Lantus 18 U at bedtime]  - PTA Glipizide resumed on admission  - initially started on Lantus 15 units at bedtime; BS were elevated (expected to be elevated though, because she was on iv steroids) so Lantus was increased to 18 units at bedtime on 1/27  - on 1/30-  BS were in 80-90's (she is on po Prednisone now); decreased Lantus to 15 units at bedtime  - BS now 110--143  - Medium sliding scale insulin   - Monitor for hypoglycemia     Recent Labs   Lab 02/01/19  1126 02/01/19  0805 02/01/19  0448 01/31/19  2104 01/31/19  1721 01/31/19  1200  01/29/19  0719  01/28/19  0755  01/27/19  0621  01/26/19  0815   GLC  --   --   --   --   --   --   --  135*  --  177*  --  185*  --  205*   * 101* 110* 248* 233* 145*   < >  --    < >  --    < >  --    < >  --     < > = values in this interval not displayed.      HTN  [PTA Losartan 50 mg po qam and 25 mg po qm, clonidine 0.1 mg po qhs, Toprol XL 50 mg po qevening]  - BP meds resumed on admission  - intermittently elevated BPs  - 1/30- increased Losartan to 50 mg po BID  - BP still elevated at times  - 1/31- increased PTA Toprol to 75 mg po daily; continue PTA Clonidine   - PRN hydralazine available for SBP>160   - BP today 131/48  - monitor BPs    Hypothyroidism  - continue PTA Synthroid    Tobacco use disorder  - reports smoking 5 cigarettes/day  - strongly encouraged to quit smoking and she agrees with the plan  - Nicotine gum prn     Diet: Combination Diet 5890-7049 Calories: Moderate Consistent CHO (4-6 CHO units/meal); Low Saturated Fat Na <2400mg Diet  Room Service    DVT Prophylaxis: Ambulate every shift  Platt Catheter: not present  Code Status: Full Code    Disposition  Plan   Expected discharge: Tomorrow, recommended to transitional care unit once clinically better.  Entered: Margarita Mcwilliams MD 02/01/2019, 1:12 PM         Margarita Mcwilliams MD  Hospitalist Service  [facility]  ____________________________________________________________________    Interval History    Feeling better, still weak; breathing is better; denies chest pain, still with some intermittent nonproductive cough, no N/V, no abd pain; discussed with     Data reviewed today: See below    Physical Exam   Vital Signs: Temp: 97.9  F (36.6  C) Temp src: Oral BP: 131/48   Heart Rate: 65 Resp: 18 SpO2: 95 % O2 Device: Nasal cannula Oxygen Delivery: 1 LPM  Weight: 146 lbs 12.8 oz    CONSTITUTIONAL: Awake, alert, NAD. Mildly anxious  HEENT: Normocephalic, atraumatic. PERRL  CARDIOVASCULAR: S1S2, RRR, no murmurs, rubs  RESPIRATORY: diminished air entry bilateral with intermittent coarse breath sounds, no wheezing   GASTROINTESTINAL:  Abdomen soft, non-distended, nontender, BS present.  MUSCULOSKELETAL: No gross deformities noted. Normal muscle tone.   Extremities- no lower extremity edema, bilaterally.  NEUROLOGIC:AAOX3, no FNDs  SKIN: Warm, dry, intact. No jaundice noted.     Data   Recent Labs   Lab 01/29/19  0719 01/28/19  0755 01/27/19  0621  01/26/19  1133 01/26/19  0815   WBC  --  11.9* 16.1*  --   --  14.5*   HGB  --  11.8 12.3  --   --  13.5   MCV  --  86 85  --   --  84   PLT  --  228 228  --   --  228   * 130* 133  --   --  128*   POTASSIUM 4.7 4.2 3.9   < >  --  3.1*   CHLORIDE 98 99 101  --   --  91*   CO2 27 26 25  --   --  25   BUN 25 25 17  --   --  14   CR 0.62 0.62 0.54  --   --  0.61   ANIONGAP 5 5 7  --   --  12   YASMEEN 9.0 9.2 9.0  --   --  9.3   * 177* 185*  --   --  205*   TROPI  --   --   --   --  0.021 0.021    < > = values in this interval not displayed.     No results found for this or any previous visit (from the past 24 hour(s)).  Medications        acetylcysteine  4 mL Nebulization Q4H WA     citalopram  20 mg Oral Daily     cloNIDine  0.1 mg Oral At Bedtime     fluticasone-salmeterol  1 puff Inhalation BID     glipiZIDE  2.5 mg Oral BID     guaiFENesin  1,200 mg Oral BID     insulin aspart  1-7 Units Subcutaneous TID AC     insulin aspart  1-5 Units Subcutaneous At Bedtime     insulin glargine  15 Units Subcutaneous At Bedtime     ipratropium - albuterol 0.5 mg/2.5 mg/3 mL  3 mL Nebulization Q4H While awake     levofloxacin  500 mg Oral Daily     levothyroxine  100 mcg Oral Daily     losartan  50 mg Oral BID     metoprolol succinate ER  75 mg Oral Daily at 8 pm     omeprazole  40 mg Oral QAM     predniSONE  40 mg Oral Daily     senna-docusate  1 tablet Oral BID    Or     senna-docusate  2 tablet Oral BID

## 2019-02-01 NOTE — PLAN OF CARE
A&Ox4, forgetful at times. Bilateral hearing aides in place. BP elevated at times, otherwise VSS on 1 L, NC. ZAZUETA. LS coarse throughout and diminished in bases. Denies pain. Up in chair for meals. Ambulated in halls x2. Voiding adequately in bathroom. BM x2 this shift. Up with SBA and cane, uses walker when walking in halls. Continue to monitor.

## 2019-02-01 NOTE — PLAN OF CARE
AOx4, anxious at times. Ativan given x1 with relief. Up with SBA + cane, voids well in BR. VSS on 1L NC ex HTN- Hydralazine given x1 with relief. Denies SOB, on scheduled nebs. LS coarse/ diminished. Incentive spirometer encouraged. LLE Mepilex CDI. Tolerating Mod carb diet. BS active (+) flatus. BGM, sliding scale insulin given. Discharge to TCU in 1-2 days.

## 2019-02-02 LAB
GLUCOSE BLDC GLUCOMTR-MCNC: 121 MG/DL (ref 70–99)
GLUCOSE BLDC GLUCOMTR-MCNC: 177 MG/DL (ref 70–99)
GLUCOSE BLDC GLUCOMTR-MCNC: 220 MG/DL (ref 70–99)
GLUCOSE BLDC GLUCOMTR-MCNC: 279 MG/DL (ref 70–99)
GLUCOSE BLDC GLUCOMTR-MCNC: 65 MG/DL (ref 70–99)

## 2019-02-02 PROCEDURE — 94640 AIRWAY INHALATION TREATMENT: CPT

## 2019-02-02 PROCEDURE — 40000275 ZZH STATISTIC RCP TIME EA 10 MIN

## 2019-02-02 PROCEDURE — 25000132 ZZH RX MED GY IP 250 OP 250 PS 637: Mod: GY | Performed by: HOSPITALIST

## 2019-02-02 PROCEDURE — 25000131 ZZH RX MED GY IP 250 OP 636 PS 637: Mod: GY | Performed by: INTERNAL MEDICINE

## 2019-02-02 PROCEDURE — 25000125 ZZHC RX 250: Performed by: INTERNAL MEDICINE

## 2019-02-02 PROCEDURE — 25000132 ZZH RX MED GY IP 250 OP 250 PS 637: Mod: GY | Performed by: PHYSICIAN ASSISTANT

## 2019-02-02 PROCEDURE — 00000146 ZZHCL STATISTIC GLUCOSE BY METER IP

## 2019-02-02 PROCEDURE — 12000000 ZZH R&B MED SURG/OB

## 2019-02-02 PROCEDURE — A9270 NON-COVERED ITEM OR SERVICE: HCPCS | Mod: GY | Performed by: HOSPITALIST

## 2019-02-02 PROCEDURE — 25000128 H RX IP 250 OP 636: Performed by: INTERNAL MEDICINE

## 2019-02-02 PROCEDURE — 94640 AIRWAY INHALATION TREATMENT: CPT | Mod: 76

## 2019-02-02 PROCEDURE — A9270 NON-COVERED ITEM OR SERVICE: HCPCS | Mod: GY | Performed by: INTERNAL MEDICINE

## 2019-02-02 PROCEDURE — A9270 NON-COVERED ITEM OR SERVICE: HCPCS | Mod: GY | Performed by: PHYSICIAN ASSISTANT

## 2019-02-02 PROCEDURE — 25000132 ZZH RX MED GY IP 250 OP 250 PS 637: Mod: GY | Performed by: INTERNAL MEDICINE

## 2019-02-02 PROCEDURE — 99232 SBSQ HOSP IP/OBS MODERATE 35: CPT | Performed by: INTERNAL MEDICINE

## 2019-02-02 RX ADMIN — GLIPIZIDE 2.5 MG: 5 TABLET ORAL at 08:28

## 2019-02-02 RX ADMIN — ACETYLCYSTEINE 4 ML: 200 SOLUTION ORAL; RESPIRATORY (INHALATION) at 20:04

## 2019-02-02 RX ADMIN — ACETYLCYSTEINE 4 ML: 200 SOLUTION ORAL; RESPIRATORY (INHALATION) at 07:25

## 2019-02-02 RX ADMIN — MELATONIN 3 MG: 3 TAB ORAL at 21:36

## 2019-02-02 RX ADMIN — IPRATROPIUM BROMIDE AND ALBUTEROL SULFATE 3 ML: .5; 2.5 SOLUTION RESPIRATORY (INHALATION) at 20:04

## 2019-02-02 RX ADMIN — OMEPRAZOLE 40 MG: 20 CAPSULE, DELAYED RELEASE ORAL at 08:28

## 2019-02-02 RX ADMIN — CITALOPRAM HYDROBROMIDE 20 MG: 20 TABLET ORAL at 08:28

## 2019-02-02 RX ADMIN — SENNOSIDES AND DOCUSATE SODIUM 1 TABLET: 8.6; 5 TABLET ORAL at 08:28

## 2019-02-02 RX ADMIN — LORAZEPAM 0.5 MG: 2 INJECTION INTRAMUSCULAR; INTRAVENOUS at 20:31

## 2019-02-02 RX ADMIN — INSULIN ASPART 1 UNITS: 100 INJECTION, SOLUTION INTRAVENOUS; SUBCUTANEOUS at 21:30

## 2019-02-02 RX ADMIN — HYDRALAZINE HYDROCHLORIDE 10 MG: 20 INJECTION INTRAMUSCULAR; INTRAVENOUS at 15:44

## 2019-02-02 RX ADMIN — GUAIFENESIN 1200 MG: 600 TABLET, EXTENDED RELEASE ORAL at 21:30

## 2019-02-02 RX ADMIN — ACETYLCYSTEINE 4 ML: 200 SOLUTION ORAL; RESPIRATORY (INHALATION) at 15:08

## 2019-02-02 RX ADMIN — PREDNISONE 40 MG: 20 TABLET ORAL at 08:28

## 2019-02-02 RX ADMIN — ACETYLCYSTEINE 4 ML: 200 SOLUTION ORAL; RESPIRATORY (INHALATION) at 15:30

## 2019-02-02 RX ADMIN — LOSARTAN POTASSIUM 50 MG: 50 TABLET, FILM COATED ORAL at 20:23

## 2019-02-02 RX ADMIN — GLIPIZIDE 2.5 MG: 5 TABLET ORAL at 18:02

## 2019-02-02 RX ADMIN — IPRATROPIUM BROMIDE AND ALBUTEROL SULFATE 3 ML: .5; 2.5 SOLUTION RESPIRATORY (INHALATION) at 10:38

## 2019-02-02 RX ADMIN — INSULIN GLARGINE 12 UNITS: 100 INJECTION, SOLUTION SUBCUTANEOUS at 21:30

## 2019-02-02 RX ADMIN — LEVOTHYROXINE SODIUM 100 MCG: 100 TABLET ORAL at 08:28

## 2019-02-02 RX ADMIN — IPRATROPIUM BROMIDE AND ALBUTEROL SULFATE 3 ML: .5; 2.5 SOLUTION RESPIRATORY (INHALATION) at 07:25

## 2019-02-02 RX ADMIN — IPRATROPIUM BROMIDE AND ALBUTEROL SULFATE 3 ML: .5; 2.5 SOLUTION RESPIRATORY (INHALATION) at 15:07

## 2019-02-02 RX ADMIN — LOSARTAN POTASSIUM 50 MG: 50 TABLET, FILM COATED ORAL at 08:28

## 2019-02-02 RX ADMIN — GUAIFENESIN 1200 MG: 600 TABLET, EXTENDED RELEASE ORAL at 08:28

## 2019-02-02 RX ADMIN — LEVOFLOXACIN 500 MG: 500 TABLET, FILM COATED ORAL at 08:28

## 2019-02-02 RX ADMIN — METOPROLOL SUCCINATE 75 MG: 50 TABLET, EXTENDED RELEASE ORAL at 20:23

## 2019-02-02 RX ADMIN — SENNOSIDES AND DOCUSATE SODIUM 2 TABLET: 8.6; 5 TABLET ORAL at 20:23

## 2019-02-02 RX ADMIN — CLONIDINE HYDROCHLORIDE 0.1 MG: 0.1 TABLET ORAL at 21:30

## 2019-02-02 ASSESSMENT — ACTIVITIES OF DAILY LIVING (ADL)
ADLS_ACUITY_SCORE: 18

## 2019-02-02 NOTE — PLAN OF CARE
Patient Ax4.  Up with SBA, belt, and walker or cane.  VSS on 1 L O2.  Lungs coarse and diminished.  Congested cough with some productive thick yellow sputum at times.  Denies pain.  Blood glucose 259, ate dinner well.  Plan is to discharge to TCU in 1-2 days.

## 2019-02-02 NOTE — PROGRESS NOTES
[facility]    Medicine Progress Note - Hospitalist Service       Date of Admission:  1/26/2019  Date of Service: 1/29/2019    Assessment & Plan   Aisha Fitzgerald is a 83 year old female with insulin dependent T2DM, HTN, COPD utilizing supplemental oxygen via NC at 1 LPM at night who was admitted on 1/26/2019 with worsening SOB, diagnosed with acute hypoxic respiratory failure in the setting of COPD exacerbation.      Acute on chronic hypoxic respiratory failure   COPD exacerbation  Acute Bronchitis   - PTA on 1L NC O2 at night at home  - still smokes 5 cigarettes/day  - admitted with worsening SOB  - on admission- leukocytosis ~14.5>16.1, procal 0.23, afebrile, troponin 0.21;  EKG unremarkable.   - CXR without infiltrate, mild atelectasis of R middle lobe again noted  - started on scheduled duonebs q4h while awake, Alb neb prn, iv Solumedrol, Zithromax; Mucinex 600 mg po BID  - IS and Acapella  - RCAT consulted, encourage pulmonary toilet  - Continue PTA Advair, hold PTA daliresp and Spiriva  - WBCs down from 14.5--16.1--11.9  - switched to Prednisone 40 mg po daily starting 1/30; plan to taper- 30 mg po daily starting tomorrow  - improved but very slow  - on 1/30- added acetylcysteine nebs q4h while awake; increased Mucinex to 1200mg po BID; stopped Zithromax and started Levaquin 500 mg po daily  - still not able to expectorate too much  - currently on O2 1 liter; try to wean off O2 during the day if able  - PT eval- rec TCU and she agrees with the plan  - SW consult  - strongly encouraged to quit smoking  - Pulm follow up as outpatient     Nausea/vomiting- resolved  - on admission- reported emesis (nonbloody, no coffee ground appearance) the day prior to admission with minimal oral intake of food or water for the last few days  - initially started on IVF, appetite improved, off iv fluids     Hypochloremic hyponatremia, improved  Hypokalemia, resolved:  - has some degree on chronic hyponatremia;  Na low Na was low  131--133 in 2015  - gastric losses due to vomiting and poor po intake likely contributing  - Na on admission 128; started on iv fluids; Na improved to 133--130--130 stable  - also chloride 91-->101, potassium 3.1-->3.9-->4.7.   - off iv fluids     T2DM, insulin dependent: A1C 7.6 on 1/26/19  [ PTA glipizide 2.5 mg po BID and Lantus 18 U at bedtime]  - PTA Glipizide resumed on admission  - initially started on Lantus 15 units at bedtime; BS were elevated (expected to be elevated though, because she was on iv steroids) so Lantus was increased to 18 units at bedtime on 1/27  - on 1/30-  BS were in 80-90's (she is on po Prednisone now); decreased Lantus to 15 units at bedtime; BS last night down to 65; since plan to further decrease Prednisone dose- will decrease Lantus to 12 units qhs  - Medium sliding scale insulin   - Monitor for hypoglycemia     Recent Labs   Lab 02/02/19  1150 02/02/19  0741 02/02/19  0241 02/01/19  2102 02/01/19  1558 02/01/19  1126  01/29/19  0719  01/28/19  0755  01/27/19  0621   GLC  --   --   --   --   --   --   --  135*  --  177*  --  185*   * 65* 121* 230* 259* 143*   < >  --    < >  --    < >  --     < > = values in this interval not displayed.      HTN  [PTA Losartan 50 mg po qam and 25 mg po qm, clonidine 0.1 mg po qhs, Toprol XL 50 mg po qevening]  - BP meds resumed on admission  - intermittently elevated BPs  - 1/30- increased Losartan to 50 mg po BID  - BP still elevated at times  - 1/31- increased PTA Toprol to 75 mg po daily; continue PTA Clonidine   - PRN hydralazine available for SBP>160   - BP today 146/54  - monitor BPs    Hypothyroidism  - continue PTA Synthroid    Tobacco use disorder  - reports smoking 5 cigarettes/day  - strongly encouraged to quit smoking and she agrees with the plan  - Nicotine gum prn     Diet: Combination Diet 0569-3075 Calories: Moderate Consistent CHO (4-6 CHO units/meal); Low Saturated Fat Na <2400mg Diet  Room Service    DVT  Prophylaxis: Ambulate every shift  Platt Catheter: not present  Code Status: Full Code    Disposition Plan   Expected discharge: plan to discharge to TCU tomorrow when bed available    Entered: Margarita Mcwilliams MD 02/02/2019, 1:46 PM         Margarita Mcwilliams MD  Hospitalist Service  [facility]  ____________________________________________________________________    Interval History    Feeling better, breathing is not back to baseline yet; denies chest pain; no N/V, no abd pain; discussed with RN and SW    Data reviewed today: See below    Physical Exam   Vital Signs: Temp: 98.3  F (36.8  C) Temp src: Oral BP: 146/54   Heart Rate: 76 Resp: 18 SpO2: 93 % O2 Device: Nasal cannula Oxygen Delivery: 1 LPM  Weight: 146 lbs 12.8 oz    CONSTITUTIONAL: Awake, alert, NAD  HEENT: Normocephalic, atraumatic. PERRL  CARDIOVASCULAR: S1S2, RRR, no murmurs, rubs  RESPIRATORY: diminished air entry bilateral with intermittent coarse breath sounds, no wheezing   GASTROINTESTINAL:  Abdomen soft, non-distended, nontender, BS present.  MUSCULOSKELETAL: No gross deformities noted. Normal muscle tone.   Extremities- no lower extremity edema, bilaterally.  NEUROLOGIC:AAOX3, no FNDs  SKIN: Warm, dry, intact. No jaundice noted.     Data   Recent Labs   Lab 01/29/19  0719 01/28/19  0755 01/27/19  0621   WBC  --  11.9* 16.1*   HGB  --  11.8 12.3   MCV  --  86 85   PLT  --  228 228   * 130* 133   POTASSIUM 4.7 4.2 3.9   CHLORIDE 98 99 101   CO2 27 26 25   BUN 25 25 17   CR 0.62 0.62 0.54   ANIONGAP 5 5 7   YASMEEN 9.0 9.2 9.0   * 177* 185*     No results found for this or any previous visit (from the past 24 hour(s)).  Medications       acetylcysteine  4 mL Nebulization Q4H WA     citalopram  20 mg Oral Daily     cloNIDine  0.1 mg Oral At Bedtime     fluticasone-salmeterol  1 puff Inhalation BID     glipiZIDE  2.5 mg Oral BID     guaiFENesin  1,200 mg Oral BID     insulin aspart  1-7 Units Subcutaneous TID AC      insulin aspart  1-5 Units Subcutaneous At Bedtime     insulin glargine  12 Units Subcutaneous At Bedtime     ipratropium - albuterol 0.5 mg/2.5 mg/3 mL  3 mL Nebulization Q4H While awake     levofloxacin  500 mg Oral Daily     levothyroxine  100 mcg Oral Daily     losartan  50 mg Oral BID     metoprolol succinate ER  75 mg Oral Daily at 8 pm     omeprazole  40 mg Oral QAM     [START ON 2/3/2019] predniSONE  30 mg Oral Daily     senna-docusate  1 tablet Oral BID    Or     senna-docusate  2 tablet Oral BID

## 2019-02-02 NOTE — PROGRESS NOTES
SERGIO  I: SW called [facility] to follow up on referral. Admissions stated they were able to accept patient today if medically stable. SW will update MD and patient.     ADDENDUM  I: SW received a call from [facility] stating they do not have the staff to accept today but could take patient tomorrow. SW updated MD/RN/Patient. Patient will need O2 for transport.     P: SW will continue to follow and assist as needed.      PAS-RR    D: Per DHS regulation, SW completed and submitted PAS-RR to [place] [employer] via the Senior LinkAge Line.  PAS-RR confirmation # is : [id number]      I: SW spoke with pt and they are aware a PAS-RR has been submitted.  SW reviewed with pt that they may be contacted for a follow up appointment within 10 days of hospital discharge if their SNF stay is < 30 days.  Contact information for [facility] LinkAge Line was also provided.    A: pt verbalized understanding.    P: Further questions may be directed to [facility] LinkAge Line at #[phone], option #4 for PAS-RR staff.    Makayla Grider, JEVON   *[phone]

## 2019-02-02 NOTE — PLAN OF CARE
A&O x4.  VSS on 1L NC, 1L baseline per pt.  Denies SOB and chest pain.  Denies pain.  Mod carb/cardiac diet.  Up with SBA and walker.  Continue to monitor.

## 2019-02-03 VITALS
BODY MASS INDEX: 26.01 KG/M2 | RESPIRATION RATE: 16 BRPM | TEMPERATURE: 98.5 F | SYSTOLIC BLOOD PRESSURE: 157 MMHG | WEIGHT: 146.8 LBS | HEART RATE: 80 BPM | OXYGEN SATURATION: 97 % | DIASTOLIC BLOOD PRESSURE: 55 MMHG | HEIGHT: 63 IN

## 2019-02-03 LAB
GLUCOSE BLDC GLUCOMTR-MCNC: 130 MG/DL (ref 70–99)
GLUCOSE BLDC GLUCOMTR-MCNC: 162 MG/DL (ref 70–99)
GLUCOSE BLDC GLUCOMTR-MCNC: 97 MG/DL (ref 70–99)

## 2019-02-03 PROCEDURE — 99239 HOSP IP/OBS DSCHRG MGMT >30: CPT | Performed by: INTERNAL MEDICINE

## 2019-02-03 PROCEDURE — 40000275 ZZH STATISTIC RCP TIME EA 10 MIN

## 2019-02-03 PROCEDURE — 00000146 ZZHCL STATISTIC GLUCOSE BY METER IP

## 2019-02-03 PROCEDURE — 25000125 ZZHC RX 250: Performed by: INTERNAL MEDICINE

## 2019-02-03 PROCEDURE — 94640 AIRWAY INHALATION TREATMENT: CPT | Mod: 76

## 2019-02-03 PROCEDURE — 94640 AIRWAY INHALATION TREATMENT: CPT

## 2019-02-03 PROCEDURE — A9270 NON-COVERED ITEM OR SERVICE: HCPCS | Mod: GY | Performed by: HOSPITALIST

## 2019-02-03 PROCEDURE — A9270 NON-COVERED ITEM OR SERVICE: HCPCS | Mod: GY | Performed by: INTERNAL MEDICINE

## 2019-02-03 PROCEDURE — 25000132 ZZH RX MED GY IP 250 OP 250 PS 637: Mod: GY | Performed by: HOSPITALIST

## 2019-02-03 PROCEDURE — 25000132 ZZH RX MED GY IP 250 OP 250 PS 637: Mod: GY | Performed by: INTERNAL MEDICINE

## 2019-02-03 RX ORDER — LANOLIN ALCOHOL/MO/W.PET/CERES
3 CREAM (GRAM) TOPICAL
DISCHARGE
Start: 2019-02-03 | End: 2019-03-05

## 2019-02-03 RX ORDER — LOSARTAN POTASSIUM 50 MG/1
50 TABLET ORAL 2 TIMES DAILY
DISCHARGE
Start: 2019-02-03 | End: 2019-03-05

## 2019-02-03 RX ORDER — LEVOFLOXACIN 500 MG/1
500 TABLET, FILM COATED ORAL DAILY
Qty: 2 TABLET | Refills: 0 | DISCHARGE
Start: 2019-02-04 | End: 2019-02-06

## 2019-02-03 RX ORDER — PREDNISONE 10 MG/1
TABLET ORAL
Qty: 18 TABLET | Refills: 0 | DISCHARGE
Start: 2019-02-03

## 2019-02-03 RX ORDER — METOPROLOL SUCCINATE 50 MG/1
75 TABLET, EXTENDED RELEASE ORAL DAILY
Qty: 30 TABLET | Refills: 0 | DISCHARGE
Start: 2019-02-03 | End: 2019-03-05

## 2019-02-03 RX ORDER — GUAIFENESIN 600 MG/1
1200 TABLET, EXTENDED RELEASE ORAL 2 TIMES DAILY
Qty: 10 TABLET | Refills: 0 | DISCHARGE
Start: 2019-02-03 | End: 2019-02-08

## 2019-02-03 RX ORDER — IPRATROPIUM BROMIDE AND ALBUTEROL SULFATE 2.5; .5 MG/3ML; MG/3ML
3 SOLUTION RESPIRATORY (INHALATION) EVERY 4 HOURS PRN
Qty: 450 ML | Refills: 0 | DISCHARGE
Start: 2019-02-03 | End: 2019-03-05

## 2019-02-03 RX ADMIN — LEVOFLOXACIN 500 MG: 500 TABLET, FILM COATED ORAL at 08:36

## 2019-02-03 RX ADMIN — PREDNISONE 30 MG: 20 TABLET ORAL at 08:36

## 2019-02-03 RX ADMIN — CITALOPRAM HYDROBROMIDE 20 MG: 20 TABLET ORAL at 08:35

## 2019-02-03 RX ADMIN — OMEPRAZOLE 40 MG: 20 CAPSULE, DELAYED RELEASE ORAL at 08:36

## 2019-02-03 RX ADMIN — ACETYLCYSTEINE 4 ML: 200 SOLUTION ORAL; RESPIRATORY (INHALATION) at 07:38

## 2019-02-03 RX ADMIN — IPRATROPIUM BROMIDE AND ALBUTEROL SULFATE 3 ML: .5; 2.5 SOLUTION RESPIRATORY (INHALATION) at 07:38

## 2019-02-03 RX ADMIN — SENNOSIDES AND DOCUSATE SODIUM 1 TABLET: 8.6; 5 TABLET ORAL at 08:36

## 2019-02-03 RX ADMIN — LOSARTAN POTASSIUM 50 MG: 50 TABLET, FILM COATED ORAL at 08:36

## 2019-02-03 RX ADMIN — ACETYLCYSTEINE 4 ML: 200 SOLUTION ORAL; RESPIRATORY (INHALATION) at 12:15

## 2019-02-03 RX ADMIN — LEVOTHYROXINE SODIUM 100 MCG: 100 TABLET ORAL at 08:36

## 2019-02-03 RX ADMIN — GLIPIZIDE 2.5 MG: 5 TABLET ORAL at 08:36

## 2019-02-03 RX ADMIN — IPRATROPIUM BROMIDE AND ALBUTEROL SULFATE 3 ML: .5; 2.5 SOLUTION RESPIRATORY (INHALATION) at 12:16

## 2019-02-03 RX ADMIN — GUAIFENESIN 1200 MG: 600 TABLET, EXTENDED RELEASE ORAL at 08:36

## 2019-02-03 ASSESSMENT — ACTIVITIES OF DAILY LIVING (ADL)
ADLS_ACUITY_SCORE: 18

## 2019-02-03 NOTE — DISCHARGE SUMMARY
Patient discharged at 12:59 PM to [facility]. IV was discontinued. Pain at time of discharge was 0/10.  Belongings returned to patient.  Discharge instructions and medications reviewed with patient.  Patient verbalized understanding and all questions were answered.  Prescriptions given to patient.  At time of discharge, patient condition was stable and left the unit on wheelchair with [facility] O2 escorted by [facility]. Nurse to nurse report given to [facility] nurse.

## 2019-02-03 NOTE — PLAN OF CARE
A/ox4, anxious at times. VSS on 1L O2 NC (baseline for patient) except elevated BP (176/58), PRN IV Hydralazine given x1 - effective. ZAZUETA and short of breath at rest at times per pt report. LS diminished with expiratory wheezing. Pursed lip breathing noted. Mod cho diet. Up with SBA. Plan for discharge tomorrow to [facility] TCU. Nursing to continue to monitor.

## 2019-02-03 NOTE — PLAN OF CARE
Physical Therapy Discharge Summary    Reason for therapy discharge:    Discharged to transitional care facility.    Progress towards therapy goal(s). See goals on Care Plan in Psychiatric electronic health record for goal details.  Goals partially met.  Barriers to achieving goals:   discharge from facility.    Therapy recommendation(s):    Continued therapy is recommended.  Rationale/Recommendations:  To further increase independence with mobility.

## 2019-02-03 NOTE — PROGRESS NOTES
SERGIO  I: SERGIO spoke with patient to discuss d.c to [facility]. Patient is agreeable and would like a w/c transport arranged. SERGIO will order portable O2 tank through [employer], as [facility] uses this agency. O2 is ordered. MD paged. SERGIO will fax orders, PAS, scripts when complete. [facility] transport is arranged for 1230.    SERGIO paged MD at 1111 to get orders completed so O2 can be delivered.    Orders faxed at 1220 to [facility].    P: SERGIO will continue to follow and assist as needed.    Makayla Grider, JEVON   *[phone]

## 2019-02-03 NOTE — DISCHARGE SUMMARY
[facility]    Discharge Summary  Hospitalist    Date of Admission:  1/26/2019  Date of Discharge:  2/3/2019  1:39 PM  Discharging Provider: Margarita Mcwilliams MD  Date of Service (when I saw the patient): 02/03/19    Discharge Diagnoses   Acute on chronic hypoxic respiratory failure   COPD exacerbation  Acute Bronchitis   Hypochloremic hyponatremia, improved  Hypokalemia, resolved  HTN  DM type 2  Hypothyroidism  Tobacco use disorder      History of Present Illness   Aisha Fitzgerald is a 83 year old female with insulin dependent T2DM, HTN, COPD utilizing supplemental oxygen via NC at 1 LPM at night who was admitted on 1/26/2019 with worsening SOB, diagnosed with acute hypoxic respiratory failure in the setting of COPD exacerbation; for a detailed HPI- please refer to H&P done by Dr. Froilan Alas on 01/26/2019.       Hospital Course   Aisha Fitzgerald was admitted on 1/26/2019.  The following problems were addressed during her hospitalization:    Acute on chronic hypoxic respiratory failure   COPD exacerbation  Acute Bronchitis   - PTA on 1L NC O2 at night at home  - still smokes 5 cigarettes/day  - admitted with worsening SOB  - on admission- leukocytosis ~14.5>16.1, procal 0.23, afebrile, troponin 0.21;  EKG unremarkable.   - CXR without infiltrate, mild atelectasis of R middle lobe again noted; procalcitonin 0.23; LA 1.3  - started on scheduled duonebs q4h while awake, Alb neb prn, iv Solumedrol, Zithromax; Mucinex 600 mg po BID; continued PTA Advair, hold PTA daliresp and Spiriv  - IS and Acapella  - RCAT consulted, encourage pulmonary toilet  - she had a very slow improvement of her respiratory status  - on 1/30- added acetylcysteine nebs q4h while awake; increased Mucinex to 1200mg po BID; stopped Zithromax and started Levaquin 500 mg po daily  - eventually - she started feeling better; WBCs down from 14.5--16.1--11.9; weaned down to O2 1 liter  - she was switched to Prednisone 40 mg po  daily with plan to taper over next 2 weeks; she will continue with Levaquin for 2 more days to finish 7 days course of ABX.  - she will resume PTA Advair, Spiriva and Daliresp after discharge  - PT eval- rec TCU and she agrees with the plan  - strongly encouraged to quit smoking  - Pulm follow up as outpatient     Nausea/vomiting- resolved  - on admission- reported emesis (nonbloody, no coffee ground appearance) the day prior to admission with minimal oral intake of food or water for the last few days  - initially started on IVF, appetite improved and able to tolerate oral intake so iv fluids stopped     Hypochloremic hyponatremia, improved  Hypokalemia, resolved:  - has some degree on chronic hyponatremia; Na low Na was low  131--133 in 2015  - gastric losses due to vomiting and poor po intake likely contributing  - Na on admission 128; started on iv fluids; Na improved to 133--130--130 stable  - also chloride 91-->101, potassium 3.1-->3.9-->4.7.   - off iv fluids     T2DM, insulin dependent: A1C 7.6 on 1/26/19  [ PTA glipizide 2.5 mg po BID and Lantus 18 U at bedtime]  - PTA Glipizide resumed on admission  - her Lantus dose was adjusted based on BS; initially, while she was on iv Solumedrol- her BS were not well controlled so Lantus dose was increased; when she was switched to po Prednisone- BS were better controlled; will plan to discharge her on Lantus 12 units at bedtime and ISS  - monitor BS qAC and HS; adjust Lantus doses as needed.     HTN  [PTA Losartan 50 mg po qam and 25 mg po qm, clonidine 0.1 mg po qhs, Toprol XL 50 mg po qevening]  - BP meds resumed on admission  - intermittently elevated BPs so PTA Losartan increased to 50 mg po BID; BP still elevated at times- so increased PTA Toprol to 75 mg po daily; continue PTA Clonidine   - monitor blood pressure     Hypothyroidism  - continue PTA Synthroid     Tobacco use disorder  - reports smoking 5 cigarettes/day  - strongly encouraged to quit smoking and  she agrees with the plan  - Nicotine gum prn       Margarita Mcwilliams MD    Significant Results and Procedures   See below    Pending Results   None  Unresulted Labs Ordered in the Past 30 Days of this Admission     No orders found from 11/27/2018 to 1/27/2019.          Code Status   Full Code       Primary Care Physician   Janey Anderson    Physical Exam   Temp: 98.5  F (36.9  C) Temp src: Oral BP: 157/55   Heart Rate: 59 Resp: 16 SpO2: 90 % O2 Device: Nasal cannula Oxygen Delivery: 1 LPM  Vitals:    01/26/19 0756 01/26/19 1354 02/01/19 0500   Weight: 67.6 kg (149 lb) 66.2 kg (145 lb 14.4 oz) 66.6 kg (146 lb 12.8 oz)     Vital Signs with Ranges  Temp:  [97.6  F (36.4  C)-98.5  F (36.9  C)] 98.5  F (36.9  C)  Heart Rate:  [59-78] 59  Resp:  [16-18] 16  BP: (114-179)/(50-63) 157/55  SpO2:  [90 %-100 %] 90 %  I/O last 3 completed shifts:  In: 680 [P.O.:680]  Out: -     CONSTITUTIONAL: Awake, alert, NAD  HEENT: Normocephalic, atraumatic. PERRL  CARDIOVASCULAR: S1S2, RRR, no murmurs, rubs  RESPIRATORY: diminished air entry bilateral with intermittent coarse breath sounds, no wheezing   GASTROINTESTINAL:  Abdomen soft, non-distended, nontender, BS present.  MUSCULOSKELETAL: No gross deformities noted. Normal muscle tone.   Extremities- no lower extremity edema, bilaterally.  NEUROLOGIC:AAOX3, no FNDs  SKIN: Warm, dry, intact. No jaundice noted.      Discharge Disposition   Discharged to short-term care facility  Condition at discharge: Satisfactory    Consultations This Hospital Stay   SOCIAL WORK IP CONSULT  PHYSICAL THERAPY ADULT IP CONSULT  PHYSICAL THERAPY ADULT IP CONSULT  OCCUPATIONAL THERAPY ADULT IP CONSULT    Time Spent on this Encounter   IMargarita, personally saw the patient today and spent greater than 30 minutes discharging this patient.    Discharge Orders      General info for SNF    Length of Stay Estimate: Short Term Care: Estimated # of Days <30  Condition at Discharge:  Improving  Level of care:skilled   Rehabilitation Potential: Fair  Admission H&P remains valid and up-to-date: Yes  Recent Chemotherapy: N/A  Use Nursing Home Standing Orders: Yes     Mantoux instructions    Give two-step Mantoux (PPD) Per Facility Policy Yes     Reason for your hospital stay    COPD exacerbation     Glucose monitor nursing POCT    Before meals and at bedtime     Follow Up and recommended labs and tests    Follow up with Nursing home physician in 2 days.  No follow up labs or test are needed.  Follow up with primary care provider after discharge home  Follow up with Pulmonology in 1 month.     Activity - Up with nursing assistance     Full Code     Physical Therapy Adult Consult    Evaluate and treat as clinically indicated.    Reason:  Deconditioning.     Occupational Therapy Adult Consult    Evaluate and treat as clinically indicated.    Reason:  Deconditioning     Oxygen - Nasal cannula    1-2 Lpm by nasal cannula to keep O2 sats 92% or greater.  Wean off O2 during the day if able     Advance Diet as Tolerated    Follow this diet upon discharge: Orders Placed This Encounter      Room Service      Combination Diet [id number] Calories: Moderate Consistent CHO (4-6 CHO units/meal); Low Saturated Fat Na <2400mg Diet     Discharge Medications   Current Discharge Medication List      START taking these medications    Details   guaiFENesin (MUCINEX) 600 MG 12 hr tablet Take 2 tablets (1,200 mg) by mouth 2 times daily for 5 days  Qty: 10 tablet, Refills: 0    Associated Diagnoses: COPD exacerbation (H)      insulin aspart (NOVOLOG VIAL) 100 UNITS/ML vial Give before meals and before bed:  For Pre-Meal Glucose:  140-189 give 1 unit   190-239 give 2 units   240-289 give 3 units   290-339 give 4 units   = or >340 give 5 units     For Bedtime Glucose  200 - 239 give 1 units   240 - 289 give 1.5 units  290 - 339 give 2 units  = or >340 give 2.5 units  Qty: 10 mL, Refills: 0    Associated Diagnoses: Type 2  diabetes mellitus with complication, with long-term current use of insulin (H)      ipratropium - albuterol 0.5 mg/2.5 mg/3 mL (DUONEB) 0.5-2.5 (3) MG/3ML neb solution Take 1 vial (3 mLs) by nebulization every 4 hours as needed for shortness of breath / dyspnea or wheezing  Qty: 450 mL, Refills: 0    Associated Diagnoses: COPD exacerbation (H)      levofloxacin (LEVAQUIN) 500 MG tablet Take 1 tablet (500 mg) by mouth daily for 2 days  Qty: 2 tablet, Refills: 0    Associated Diagnoses: COPD exacerbation (H)      melatonin 3 MG tablet Take 1 tablet (3 mg) by mouth nightly as needed for sleep    Associated Diagnoses: Insomnia, unspecified type      nicotine (NICORETTE) 2 MG gum Place 1 each (2 mg) inside cheek every hour as needed for smoking cessation    Associated Diagnoses: Tobacco use disorder      predniSONE (DELTASONE) 10 MG tablet 3 tabs daily for 3 days, then 2 tabs daily for 3 days, then 1 tab daily for 3 days, then stop.  Qty: 18 tablet, Refills: 0    Associated Diagnoses: COPD exacerbation (H)         CONTINUE these medications which have CHANGED    Details   insulin glargine (LANTUS SOLOSTAR PEN) 100 UNIT/ML pen Inject 12 Units Subcutaneous At Bedtime  Qty: 5.4 mL, Refills: 0    Associated Diagnoses: Type 2 diabetes mellitus with complication, with long-term current use of insulin (H)      losartan (COZAAR) 50 MG tablet Take 1 tablet (50 mg) by mouth 2 times daily Take 50mg in the morning and 25mg in the evening.    Associated Diagnoses: Hypertension, unspecified type      metoprolol succinate ER (TOPROL-XL) 50 MG 24 hr tablet Take 1.5 tablets (75 mg) by mouth daily  Qty: 30 tablet, Refills: 0    Associated Diagnoses: Hypertension, unspecified type         CONTINUE these medications which have NOT CHANGED    Details   albuterol (PROVENTIL HFA: VENTOLIN HFA) 108 (90 BASE) MCG/ACT inhaler Inhale 2-4 puffs into the lungs every 4 hours as needed       atorvastatin (LIPITOR) 40 MG tablet Take 40 mg by mouth  daily      calcium carb 1250 mg, 500 mg Ramona,/vitamin D 200 units (OSCAL WITH D) 500-200 MG-UNIT per tablet Take 0.5 tablets by mouth daily      Cholecalciferol (VITAMIN D3 PO) Take 1,000 Units by mouth daily.      CITALOPRAM HYDROBROMIDE PO Take 20 mg by mouth daily.      CLONIDINE HCL PO Take 0.1 mg by mouth At Bedtime       COENZYME Q-10 PO Take 100 mg by mouth daily.      DALIRESP 500 MCG TABS tablet Take 500 mcg by mouth daily       fluticasone-salmeterol (ADVAIR) 250-50 MCG/DOSE diskus inhaler Inhale 1 puff into the lungs 2 times daily      glipiZIDE (GLUCOTROL) 5 MG tablet Take 0.5 tablets by mouth 2 times daily      levothyroxine (SYNTHROID/LEVOTHROID) 100 MCG tablet Take 100 mcg by mouth daily      OMEPRAZOLE PO Take 40 mg by mouth every morning      tiotropium (SPIRIVA) 18 MCG inhalation capsule Inhale 18 mcg into the lungs daily      vitamin B1 (THIAMINE) 100 MG tablet Take 100 mg by mouth daily      vitamin B6 (PYRIDOXINE) 25 MG tablet Take 0.5 tablets by mouth daily           Allergies   Allergies   Allergen Reactions     Hctz [Hydrochlorothiazide] Unknown     Contrast Dye Rash     Data   Most Recent 3 CBC's:  Recent Labs   Lab Test 01/28/19  0755 01/27/19  0621 01/26/19  0815   WBC 11.9* 16.1* 14.5*   HGB 11.8 12.3 13.5   MCV 86 85 84    228 228      Most Recent 3 BMP's:  Recent Labs   Lab Test 01/29/19  0719 01/28/19  0755 01/27/19  0621   * 130* 133   POTASSIUM 4.7 4.2 3.9   CHLORIDE 98 99 101   CO2 27 26 25   BUN 25 25 17   CR 0.62 0.62 0.54   ANIONGAP 5 5 7   YASMEEN 9.0 9.2 9.0   * 177* 185*     Most Recent 2 LFT's:  Recent Labs   Lab Test 11/02/17  0912 05/27/17  0936   AST 16 16   ALT 21 19   ALKPHOS 117 100   BILITOTAL 0.7 0.6     Most Recent INR's and Anticoagulation Dosing History:  Anticoagulation Dose History     Recent Dosing and Labs Latest Ref Rng & Units 1/22/2013 1/23/2013 1/24/2013 1/25/2013 1/26/2013 1/27/2013 1/28/2013    INR 0.86 - 1.14 1.00 0.99 0.99 0.99 1.11 0.99  1.02        Most Recent 3 Troponin's:  Recent Labs   Lab Test 01/26/19  1133 01/26/19  0815 07/06/15  1245   TROPI 0.021 0.021 <0.015  The 99th percentile for upper reference range is 0.045 ug/L.  Troponin values in   the range of 0.045 - 0.120 ug/L may be associated with risks of adverse   clinical events.       Most Recent Cholesterol Panel:  Recent Labs   Lab Test 01/03/13  0515   CHOL 78   LDL 34   HDL 32*   TRIG 62     Most Recent 6 Bacteria Isolates From Any Culture (See EPIC Reports for Culture Details):  Recent Labs   Lab Test 01/30/19  0500 05/27/17  1056 07/01/15  1545 01/09/13  1545 01/09/13  1414 01/09/13  1020   CULT Canceled, Test credited  >10 Squamous epithelial cells/low power field indicates oral contamination. Please   recollect.  *  Notification of test cancellation was given to  Karo Moore RN at 1046 [id number]   >100,000 colonies/mL Beta hemolytic Streptococcus group B Beta Hemolytic   Streptococcus groups A and B are susceptible to ampicillin, penicillin,   vancomycin, and the cephalosporins.  Susceptibility testing is not routinely   done on these organisms isolated from urine.  * >100,000 colonies/mL Beta hemolytic Streptococcus group B Beta Hemolytic   Streptococcus groups A and B are susceptible to ampicillin, penicillin,   vancomycin, and the cephalosporins.  Susceptibility testing is not routinely   done on these organisms isolated from urine.  * Charge credited Duplicate request  Charge credited Duplicate request No growth No yeast isolated     Most Recent TSH, T4 and A1c Labs:  Recent Labs   Lab Test 01/26/19  0815  01/02/13  0535   TSH  --   --  0.70   A1C 7.6*   < >  --     < > = values in this interval not displayed.     Results for orders placed or performed during the hospital encounter of 01/26/19   XR Chest 2 Views    Narrative    CHEST TWO VIEWS  January 26, 2019 8:38 AM    HISTORY: Shortness of breath.    COMPARISON: 11/25/2018.      Impression    IMPRESSION: Again  seen is mild atelectasis of the right middle lobe.  This is best demonstrated on the lateral view and is likely unchanged.  There is hyperexpansion of the lungs suggesting obstructive pulmonary  disease. There is slight blunting of the costophrenic angles which may  also be due to atelectasis or chronic scarring. No other abnormality  is noted. I see no definite change since the previous examination.     LILI SERRA MD

## 2019-02-03 NOTE — PLAN OF CARE
Pt is A&Ox4. VSS on 1L O2 via nasal cannula. Denied pain. Denied SOB but ZAZUETA. Lung sounds diminished. Mod carb diet. SBA+cane, voids in bathroom. PIV SL. Rested well overnight.

## 2019-02-07 ENCOUNTER — RECORDS - HEALTHEAST (OUTPATIENT)
Dept: LAB | Facility: CLINIC | Age: 84
End: 2019-02-07

## 2019-02-08 LAB
ANION GAP SERPL CALCULATED.3IONS-SCNC: 5 MMOL/L (ref 5–18)
BUN SERPL-MCNC: 13 MG/DL (ref 8–28)
CALCIUM SERPL-MCNC: 8.3 MG/DL (ref 8.5–10.5)
CHLORIDE BLD-SCNC: 98 MMOL/L (ref 98–107)
CO2 SERPL-SCNC: 30 MMOL/L (ref 22–31)
CREAT SERPL-MCNC: 0.63 MG/DL (ref 0.6–1.1)
GFR SERPL CREATININE-BSD FRML MDRD: >60 ML/MIN/1.73M2
GLUCOSE BLD-MCNC: 88 MG/DL (ref 70–125)
POTASSIUM BLD-SCNC: 4.1 MMOL/L (ref 3.5–5)
SODIUM SERPL-SCNC: 133 MMOL/L (ref 136–145)

## 2019-07-23 ENCOUNTER — HOSPITAL ENCOUNTER (OUTPATIENT)
Dept: CARDIAC REHAB | Facility: CLINIC | Age: 84
End: 2019-07-23
Attending: PHYSICIAN ASSISTANT
Payer: MEDICARE

## 2019-07-23 PROCEDURE — 40000244 ZZH STATISTIC VISIT PULM REHAB: Performed by: CLINICAL EXERCISE PHYSIOLOGIST

## 2019-07-23 PROCEDURE — G0424 PULMONARY REHAB W EXER: HCPCS | Performed by: CLINICAL EXERCISE PHYSIOLOGIST

## 2019-07-30 ENCOUNTER — HOSPITAL ENCOUNTER (OUTPATIENT)
Dept: CARDIAC REHAB | Facility: CLINIC | Age: 84
End: 2019-07-30
Attending: PHYSICIAN ASSISTANT
Payer: MEDICARE

## 2019-07-30 LAB — GLUCOSE BLDC GLUCOMTR-MCNC: 165 MG/DL (ref 70–99)

## 2019-07-30 PROCEDURE — 00000146 ZZHCL STATISTIC GLUCOSE BY METER IP

## 2019-07-30 PROCEDURE — 40000244 ZZH STATISTIC VISIT PULM REHAB

## 2019-07-30 PROCEDURE — G0424 PULMONARY REHAB W EXER: HCPCS

## 2019-08-01 ENCOUNTER — HOSPITAL ENCOUNTER (OUTPATIENT)
Dept: CARDIAC REHAB | Facility: CLINIC | Age: 84
End: 2019-08-01
Attending: PHYSICIAN ASSISTANT
Payer: MEDICARE

## 2019-08-01 LAB — GLUCOSE BLDC GLUCOMTR-MCNC: 226 MG/DL (ref 70–99)

## 2019-08-01 PROCEDURE — 00000146 ZZHCL STATISTIC GLUCOSE BY METER IP

## 2019-08-01 PROCEDURE — G0424 PULMONARY REHAB W EXER: HCPCS

## 2019-08-01 PROCEDURE — 40000244 ZZH STATISTIC VISIT PULM REHAB

## 2019-08-06 ENCOUNTER — HOSPITAL ENCOUNTER (OUTPATIENT)
Dept: CARDIAC REHAB | Facility: CLINIC | Age: 84
End: 2019-08-06
Attending: PHYSICIAN ASSISTANT
Payer: MEDICARE

## 2019-08-06 LAB — GLUCOSE BLDC GLUCOMTR-MCNC: 155 MG/DL (ref 70–99)

## 2019-08-06 PROCEDURE — 00000146 ZZHCL STATISTIC GLUCOSE BY METER IP

## 2019-08-06 PROCEDURE — 40000244 ZZH STATISTIC VISIT PULM REHAB

## 2019-08-06 PROCEDURE — G0424 PULMONARY REHAB W EXER: HCPCS

## 2019-08-08 ENCOUNTER — HOSPITAL ENCOUNTER (OUTPATIENT)
Dept: CARDIAC REHAB | Facility: CLINIC | Age: 84
End: 2019-08-08
Attending: PHYSICIAN ASSISTANT
Payer: MEDICARE

## 2019-08-08 LAB
GLUCOSE BLDC GLUCOMTR-MCNC: 125 MG/DL (ref 70–99)
GLUCOSE BLDC GLUCOMTR-MCNC: 191 MG/DL (ref 70–99)

## 2019-08-08 PROCEDURE — G0424 PULMONARY REHAB W EXER: HCPCS

## 2019-08-08 PROCEDURE — 00000146 ZZHCL STATISTIC GLUCOSE BY METER IP

## 2019-08-08 PROCEDURE — 40000244 ZZH STATISTIC VISIT PULM REHAB

## 2019-08-16 ENCOUNTER — HOSPITAL ENCOUNTER (OUTPATIENT)
Dept: CARDIAC REHAB | Facility: CLINIC | Age: 84
End: 2019-08-16
Attending: PHYSICIAN ASSISTANT
Payer: MEDICARE

## 2019-08-16 LAB — GLUCOSE BLDC GLUCOMTR-MCNC: 207 MG/DL (ref 70–99)

## 2019-08-16 PROCEDURE — 40000244 ZZH STATISTIC VISIT PULM REHAB

## 2019-08-16 PROCEDURE — G0424 PULMONARY REHAB W EXER: HCPCS

## 2019-08-16 PROCEDURE — 00000146 ZZHCL STATISTIC GLUCOSE BY METER IP

## 2019-08-20 ENCOUNTER — HOSPITAL ENCOUNTER (OUTPATIENT)
Dept: CARDIAC REHAB | Facility: CLINIC | Age: 84
End: 2019-08-20
Attending: PHYSICIAN ASSISTANT
Payer: MEDICARE

## 2019-08-20 LAB
GLUCOSE BLDC GLUCOMTR-MCNC: 153 MG/DL (ref 70–99)
GLUCOSE BLDC GLUCOMTR-MCNC: 236 MG/DL (ref 70–99)

## 2019-08-20 PROCEDURE — 40000244 ZZH STATISTIC VISIT PULM REHAB

## 2019-08-20 PROCEDURE — G0424 PULMONARY REHAB W EXER: HCPCS

## 2019-08-20 PROCEDURE — 00000146 ZZHCL STATISTIC GLUCOSE BY METER IP

## 2019-08-22 ENCOUNTER — HOSPITAL ENCOUNTER (OUTPATIENT)
Dept: CARDIAC REHAB | Facility: CLINIC | Age: 84
End: 2019-08-22
Attending: PHYSICIAN ASSISTANT
Payer: MEDICARE

## 2019-08-22 LAB — GLUCOSE BLDC GLUCOMTR-MCNC: 246 MG/DL (ref 70–99)

## 2019-08-22 PROCEDURE — 40000244 ZZH STATISTIC VISIT PULM REHAB

## 2019-08-22 PROCEDURE — 00000146 ZZHCL STATISTIC GLUCOSE BY METER IP

## 2019-08-22 PROCEDURE — G0424 PULMONARY REHAB W EXER: HCPCS

## 2019-08-27 ENCOUNTER — HOSPITAL ENCOUNTER (OUTPATIENT)
Dept: CARDIAC REHAB | Facility: CLINIC | Age: 84
End: 2019-08-27
Attending: PHYSICIAN ASSISTANT
Payer: MEDICARE

## 2019-08-27 LAB
GLUCOSE BLDC GLUCOMTR-MCNC: 147 MG/DL (ref 70–99)
GLUCOSE BLDC GLUCOMTR-MCNC: 162 MG/DL (ref 70–99)

## 2019-08-27 PROCEDURE — 00000146 ZZHCL STATISTIC GLUCOSE BY METER IP

## 2019-08-27 PROCEDURE — 40000244 ZZH STATISTIC VISIT PULM REHAB

## 2019-08-27 PROCEDURE — G0424 PULMONARY REHAB W EXER: HCPCS

## 2019-08-29 ENCOUNTER — HOSPITAL ENCOUNTER (OUTPATIENT)
Dept: CARDIAC REHAB | Facility: CLINIC | Age: 84
End: 2019-08-29
Attending: PHYSICIAN ASSISTANT
Payer: MEDICARE

## 2019-08-29 LAB — GLUCOSE BLDC GLUCOMTR-MCNC: 219 MG/DL (ref 70–99)

## 2019-08-29 PROCEDURE — 00000146 ZZHCL STATISTIC GLUCOSE BY METER IP

## 2019-08-29 PROCEDURE — 40000244 ZZH STATISTIC VISIT PULM REHAB

## 2019-08-29 PROCEDURE — G0424 PULMONARY REHAB W EXER: HCPCS

## 2019-09-03 ENCOUNTER — HOSPITAL ENCOUNTER (OUTPATIENT)
Dept: CARDIAC REHAB | Facility: CLINIC | Age: 84
End: 2019-09-03
Attending: PHYSICIAN ASSISTANT
Payer: MEDICARE

## 2019-09-03 LAB
GLUCOSE BLDC GLUCOMTR-MCNC: 201 MG/DL (ref 70–99)
GLUCOSE BLDC GLUCOMTR-MCNC: 216 MG/DL (ref 70–99)

## 2019-09-03 PROCEDURE — 00000146 ZZHCL STATISTIC GLUCOSE BY METER IP

## 2019-09-03 PROCEDURE — 40000244 ZZH STATISTIC VISIT PULM REHAB: Performed by: CLINICAL EXERCISE PHYSIOLOGIST

## 2019-09-03 PROCEDURE — G0424 PULMONARY REHAB W EXER: HCPCS | Mod: KX | Performed by: CLINICAL EXERCISE PHYSIOLOGIST

## 2019-09-10 ENCOUNTER — HOSPITAL ENCOUNTER (OUTPATIENT)
Dept: CARDIAC REHAB | Facility: CLINIC | Age: 84
End: 2019-09-10
Attending: PHYSICIAN ASSISTANT
Payer: MEDICARE

## 2019-09-10 LAB
GLUCOSE BLDC GLUCOMTR-MCNC: 121 MG/DL (ref 70–99)
GLUCOSE BLDC GLUCOMTR-MCNC: 243 MG/DL (ref 70–99)

## 2019-09-10 PROCEDURE — 40000244 ZZH STATISTIC VISIT PULM REHAB

## 2019-09-10 PROCEDURE — G0424 PULMONARY REHAB W EXER: HCPCS | Mod: KX

## 2019-09-10 PROCEDURE — 00000146 ZZHCL STATISTIC GLUCOSE BY METER IP

## 2019-09-12 ENCOUNTER — HOSPITAL ENCOUNTER (OUTPATIENT)
Dept: CARDIAC REHAB | Facility: CLINIC | Age: 84
End: 2019-09-12
Attending: PHYSICIAN ASSISTANT
Payer: MEDICARE

## 2019-09-12 PROCEDURE — G0424 PULMONARY REHAB W EXER: HCPCS | Mod: KX | Performed by: CLINICAL EXERCISE PHYSIOLOGIST

## 2019-09-12 PROCEDURE — 40000244 ZZH STATISTIC VISIT PULM REHAB: Performed by: CLINICAL EXERCISE PHYSIOLOGIST

## 2019-09-17 ENCOUNTER — ALLIED HEALTH/NURSE VISIT (OUTPATIENT)
Dept: CARDIAC REHAB | Facility: CLINIC | Age: 84
End: 2019-09-17
Payer: MEDICARE

## 2020-06-21 ENCOUNTER — OFFICE VISIT (OUTPATIENT)
Dept: URGENT CARE | Facility: URGENT CARE | Age: 85
End: 2020-06-21
Payer: MEDICARE

## 2020-06-21 VITALS
DIASTOLIC BLOOD PRESSURE: 67 MMHG | TEMPERATURE: 99.2 F | OXYGEN SATURATION: 92 % | HEART RATE: 77 BPM | SYSTOLIC BLOOD PRESSURE: 135 MMHG | RESPIRATION RATE: 18 BRPM
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
## 2020-06-21 PROCEDURE — 99203 OFFICE O/P NEW LOW 30 MIN: CPT | Performed by: FAMILY MEDICINE

## 2020-06-21 RX ORDER — CETIRIZINE HYDROCHLORIDE 10 MG/1
10 TABLET ORAL DAILY
Qty: 10 TABLET | Refills: 0 | Status: SHIPPED | OUTPATIENT
Start: 2020-06-21 | End: 2020-07-01

## 2020-06-21 RX ORDER — AMOXICILLIN 500 MG/1
500 CAPSULE ORAL 3 TIMES DAILY
COMMUNITY
Start: 2020-06-19

## 2020-06-21 NOTE — PROGRESS NOTES
SUBJECTIVE: Aisha Fitzgerald is a 84 year old female presenting with a chief complaint of bug bite under left eyelid.  Onset of symptoms was day(s) ago.  Course of illness is waxing and waning.    Severity moderate  Current and Associated symptoms: itching  Treatment measures tried include topical.  Predisposing factors include None.    Past Medical History:   Diagnosis Date     Bronchiolitis      Cancer (H)     skin cancer     COPD (chronic obstructive pulmonary disease) (H)      Diabetes (H)      Hypertension      Ulcer      Allergies   Allergen Reactions     Hctz [Hydrochlorothiazide] Unknown     Contrast Dye Rash     Social History     Tobacco Use     Smoking status: Current Some Day Smoker     Packs/day: 0.50     Years: 50.00     Pack years: 25.00     Types: Cigarettes     Last attempt to quit: 4/27/2017     Years since quitting: 3.1     Smokeless tobacco: Never Used   Substance Use Topics     Alcohol use: No       ROS:  SKIN: no rash  GI: no vomiting    OBJECTIVE:  /67   Pulse 77   Temp 99.2  F (37.3  C) (Tympanic)   Resp 18   SpO2 92%    GENERAL APPEARANCE: healthy, alert and no distress  EYES: Eyes grossly normal to inspection  SKIN: small red/skin colored lump under left eye      ICD-10-CM    1. Bug bite, initial encounter  W57.XXXA cetirizine (ZYRTEC) 10 MG tablet       Fluids/Rest, f/u if worse/not any better

## 2021-03-01 ENCOUNTER — IMMUNIZATION (OUTPATIENT)
Dept: NURSING | Facility: CLINIC | Age: 86
End: 2021-03-01
Payer: COMMERCIAL

## 2021-03-01 PROCEDURE — 0001A PR COVID VAC PFIZER DIL RECON 30 MCG/0.3 ML IM: CPT

## 2021-03-01 PROCEDURE — 91300 PR COVID VAC PFIZER DIL RECON 30 MCG/0.3 ML IM: CPT

## 2021-03-20 ENCOUNTER — HEALTH MAINTENANCE LETTER (OUTPATIENT)
Age: 86
End: 2021-03-20

## 2021-03-22 ENCOUNTER — IMMUNIZATION (OUTPATIENT)
Dept: NURSING | Facility: CLINIC | Age: 86
End: 2021-03-22
Attending: INTERNAL MEDICINE
Payer: COMMERCIAL

## 2021-03-22 PROCEDURE — 0002A PR COVID VAC PFIZER DIL RECON 30 MCG/0.3 ML IM: CPT

## 2021-03-22 PROCEDURE — 91300 PR COVID VAC PFIZER DIL RECON 30 MCG/0.3 ML IM: CPT

## 2021-07-10 ENCOUNTER — HEALTH MAINTENANCE LETTER (OUTPATIENT)
Age: 86
End: 2021-07-10

## 2021-08-05 ENCOUNTER — TRANSCRIBE ORDERS (OUTPATIENT)
Dept: OTHER | Age: 86
End: 2021-08-05

## 2021-08-05 DIAGNOSIS — M54.10 RADICULAR PAIN OF RIGHT LOWER EXTREMITY: Primary | ICD-10-CM

## 2021-08-15 ENCOUNTER — APPOINTMENT (OUTPATIENT)
Dept: GENERAL RADIOLOGY | Facility: CLINIC | Age: 86
End: 2021-08-15
Attending: EMERGENCY MEDICINE
Payer: COMMERCIAL

## 2021-08-15 ENCOUNTER — HOSPITAL ENCOUNTER (EMERGENCY)
Facility: CLINIC | Age: 86
Discharge: HOME | End: 2021-08-15
Attending: EMERGENCY MEDICINE | Admitting: EMERGENCY MEDICINE
Payer: COMMERCIAL

## 2021-08-15 VITALS
HEIGHT: 63 IN | BODY MASS INDEX: 24.63 KG/M2 | OXYGEN SATURATION: 93 % | DIASTOLIC BLOOD PRESSURE: 66 MMHG | HEART RATE: 80 BPM | WEIGHT: 139 LBS | SYSTOLIC BLOOD PRESSURE: 170 MMHG | RESPIRATION RATE: 20 BRPM | TEMPERATURE: 98.4 F

## 2021-08-15 DIAGNOSIS — R05.9 COUGH: ICD-10-CM

## 2021-08-15 LAB
ALBUMIN SERPL-MCNC: 3.5 G/DL (ref 3.4–5)
ALP SERPL-CCNC: 89 U/L (ref 40–150)
ALT SERPL W P-5'-P-CCNC: 39 U/L (ref 0–50)
ANION GAP SERPL CALCULATED.3IONS-SCNC: 2 MMOL/L (ref 3–14)
AST SERPL W P-5'-P-CCNC: 21 U/L (ref 0–45)
ATRIAL RATE - MUSE: 67 BPM
BASOPHILS # BLD AUTO: 0 10E3/UL (ref 0–0.2)
BASOPHILS NFR BLD AUTO: 0 %
BILIRUB SERPL-MCNC: 0.4 MG/DL (ref 0.2–1.3)
BUN SERPL-MCNC: 18 MG/DL (ref 7–30)
CALCIUM SERPL-MCNC: 9.2 MG/DL (ref 8.5–10.1)
CHLORIDE BLD-SCNC: 91 MMOL/L (ref 94–109)
CO2 SERPL-SCNC: 33 MMOL/L (ref 20–32)
CREAT SERPL-MCNC: 0.69 MG/DL (ref 0.52–1.04)
DIASTOLIC BLOOD PRESSURE - MUSE: NORMAL MMHG
EOSINOPHIL # BLD AUTO: 0 10E3/UL (ref 0–0.7)
EOSINOPHIL NFR BLD AUTO: 0 %
ERYTHROCYTE [DISTWIDTH] IN BLOOD BY AUTOMATED COUNT: 13.2 % (ref 10–15)
GFR SERPL CREATININE-BSD FRML MDRD: 80 ML/MIN/1.73M2
GLUCOSE BLD-MCNC: 119 MG/DL (ref 70–99)
HCT VFR BLD AUTO: 34.8 % (ref 35–47)
HGB BLD-MCNC: 11.7 G/DL (ref 11.7–15.7)
IMM GRANULOCYTES # BLD: 0.2 10E3/UL
IMM GRANULOCYTES NFR BLD: 2 %
INTERPRETATION ECG - MUSE: NORMAL
LACTATE SERPL-SCNC: 0.5 MMOL/L (ref 0.7–2)
LYMPHOCYTES # BLD AUTO: 1.9 10E3/UL (ref 0.8–5.3)
LYMPHOCYTES NFR BLD AUTO: 17 %
MCH RBC QN AUTO: 27.5 PG (ref 26.5–33)
MCHC RBC AUTO-ENTMCNC: 33.6 G/DL (ref 31.5–36.5)
MCV RBC AUTO: 82 FL (ref 78–100)
MONOCYTES # BLD AUTO: 1.4 10E3/UL (ref 0–1.3)
MONOCYTES NFR BLD AUTO: 13 %
NEUTROPHILS # BLD AUTO: 7.6 10E3/UL (ref 1.6–8.3)
NEUTROPHILS NFR BLD AUTO: 68 %
NRBC # BLD AUTO: 0 10E3/UL
NRBC BLD AUTO-RTO: 0 /100
NT-PROBNP SERPL-MCNC: 462 PG/ML (ref 0–1800)
P AXIS - MUSE: 94 DEGREES
PLATELET # BLD AUTO: 433 10E3/UL (ref 150–450)
POTASSIUM BLD-SCNC: 3.6 MMOL/L (ref 3.4–5.3)
PR INTERVAL - MUSE: 188 MS
PROCALCITONIN SERPL-MCNC: <0.05 NG/ML
PROT SERPL-MCNC: 6.6 G/DL (ref 6.8–8.8)
QRS DURATION - MUSE: 106 MS
QT - MUSE: 416 MS
QTC - MUSE: 439 MS
R AXIS - MUSE: 14 DEGREES
RBC # BLD AUTO: 4.25 10E6/UL (ref 3.8–5.2)
SODIUM SERPL-SCNC: 126 MMOL/L (ref 133–144)
SYSTOLIC BLOOD PRESSURE - MUSE: NORMAL MMHG
T AXIS - MUSE: 75 DEGREES
TROPONIN I SERPL-MCNC: <0.015 UG/L (ref 0–0.04)
TROPONIN I SERPL-MCNC: <0.015 UG/L (ref 0–0.04)
VENTRICULAR RATE- MUSE: 67 BPM
WBC # BLD AUTO: 11.1 10E3/UL (ref 4–11)

## 2021-08-15 PROCEDURE — 83605 ASSAY OF LACTIC ACID: CPT | Performed by: EMERGENCY MEDICINE

## 2021-08-15 PROCEDURE — 99285 EMERGENCY DEPT VISIT HI MDM: CPT | Mod: 25

## 2021-08-15 PROCEDURE — 85025 COMPLETE CBC W/AUTO DIFF WBC: CPT | Performed by: EMERGENCY MEDICINE

## 2021-08-15 PROCEDURE — 84484 ASSAY OF TROPONIN QUANT: CPT | Mod: 91 | Performed by: EMERGENCY MEDICINE

## 2021-08-15 PROCEDURE — 85004 AUTOMATED DIFF WBC COUNT: CPT | Performed by: EMERGENCY MEDICINE

## 2021-08-15 PROCEDURE — 80053 COMPREHEN METABOLIC PANEL: CPT | Performed by: EMERGENCY MEDICINE

## 2021-08-15 PROCEDURE — 84145 PROCALCITONIN (PCT): CPT | Performed by: EMERGENCY MEDICINE

## 2021-08-15 PROCEDURE — 71046 X-RAY EXAM CHEST 2 VIEWS: CPT

## 2021-08-15 PROCEDURE — 36415 COLL VENOUS BLD VENIPUNCTURE: CPT | Performed by: EMERGENCY MEDICINE

## 2021-08-15 PROCEDURE — 83880 ASSAY OF NATRIURETIC PEPTIDE: CPT | Performed by: EMERGENCY MEDICINE

## 2021-08-15 PROCEDURE — 93005 ELECTROCARDIOGRAM TRACING: CPT

## 2021-08-15 ASSESSMENT — MIFFLIN-ST. JEOR: SCORE: 1049.63

## 2021-08-15 ASSESSMENT — ENCOUNTER SYMPTOMS
COUGH: 1
ARTHRALGIAS: 1
SHORTNESS OF BREATH: 1

## 2021-08-15 NOTE — ED NOTES
Bed: ED08  Expected date:   Expected time:   Means of arrival:   Comments:  [id number] f sob hypertension

## 2021-08-15 NOTE — ED NOTES
Assisted to bedside commode to urinate.  Patient moves very slowly.  Requires assistance to transfer.

## 2021-08-15 NOTE — ED TRIAGE NOTES
"Hx of chronic COPD, uses O2 2 lpm intermittently at home. Reports tonight increased \"raspiness\" and dry cough. Patient states left hip pain from previous fall has been increasing, minimal help from oxycontin  "

## 2021-08-15 NOTE — ED PROVIDER NOTES
"  History   Chief Complaint:  Shortness of Breath and Hip Pain     The history is provided by the patient.      Aisha Fitzgerald is a 85 year old female with history of COPD, bronchitis and diabetes who presents with \"raspy breathing\" and hip pain. Patient is on oxygen at night and started to notice that she had raspy breathing. She did not feel short of breath, but was worried about her raspy breathing. She also reports that she has a cough that sounds different from her usual COPD cough. Patient also reports that she had a recent fall and received an xray that showed a compression fracture one week ago. Her pain is concentrated on the outside of her left leg and radiates to her hip, right leg and lower back. She states that her pain also makes it difficult to walk but notes that she has been weight bearing.      Review of Systems   Respiratory: Positive for cough and shortness of breath.    Musculoskeletal: Positive for arthralgias (Hip and bilateral leg pain).   All other systems reviewed and are negative.    Allergies:  Hydrochlorothiazide  Contrast Dye  Levofloxacin    Medications:  Clonidine  Albuterol inhaler  Trazodone  Roflumilast  Methylprednisolone  Doxycyline monohydrate  Bupropion  Oxycodone    Past Medical History:    Bronchitis  Breast Cancer  COPD  Diabetes  Hypertension  Peptic Ulcer Disease  Gastroenteritis  Acute Tubular Necrosis   Depression  Gum Disease    Past Surgical History:    Biopsy of breast  Bimalleolar reduction left ankle  Cataract removal  Strabismus surgery     Family History:    Diabetes, father  Ovarian Cancer, mother  Diabetes, brother  Melanoma, brother    Social History:  PCP: Janey Anderson  Presents to the ED alone    Physical Exam     Patient Vitals for the past 24 hrs:   BP Temp Temp src Pulse Resp SpO2 Height Weight   08/15/21 0900 (!) 146/62 -- -- 80 -- 93 % -- --   08/15/21 0800 (!) 184/66 -- -- 70 -- 99 % -- --   08/15/21 0600 (!) 201/79 -- -- 69 -- 97 % -- -- " "  08/15/21 0545 -- -- -- -- -- 98 % -- --   08/15/21 0530 (!) 170/79 -- -- 68 -- 99 % -- --   08/15/21 0522 (!) 204/77 98.4  F (36.9  C) Oral 68 20 96 % 1.6 m (5' 3\") 63 kg (139 lb)   08/15/21 0515 (!) 204/77 -- -- -- -- 99 % -- --       Physical Exam  General/Appearance: appears stated age, well-groomed  Eyes: EOMI, no scleral injection, no icterus  ENT: MMM  Neck: supple, nl ROM, no stiffness  Cardiovascular: RRR, nl S1S2, no m/r/g, 2+ pulses in all 4 extremities, cap refill <2sec  Respiratory: CTAB, good air movement throughout, no wheezes/rhonchi/rales, no increased WOB, no retractions, on 2L NC  GI: abd soft, non-distended, nttp,  no HSM, no rebound, no guarding, nl BS  MSK: RIVAS, good tone, no bony abnormality  Skin: warm and well-perfused, no rash, no edema, no ecchymosis, nl turgor  Neuro: GCS 15, alert and oriented, no gross focal neuro deficits  Psych: interacts appropriately  Heme: no petechia, no purpura, no active bleeding        Emergency Department Course     ECG:  ECG taken at 0556, ECG read at 0713  Normal sinus rhythm   Incomplete right bundle branch block   Anteroseptal infarct, age undetermined  Abnormal ECG  No significant change as compared to prior, dated 1/26/19.   Rate 67 bpm. NY interval 188 ms. QRS duration 106 ms. QT/QTc 416/439 ms. P-R-T axes 94 14 75.    Imaging:  XR Chest 2 Views:  IMPRESSION: Stable scarring in the right lung base. Otherwise negative chest. No acute findings.    Reading per radiology     Laboratory:  CBC: WBC 11.1 (H), HGB 34.8 (L),    CMP: Glucose 119 (H), Sodium 126 (L), Chloride 91 (L), CO2 33 (H), Anion Gap 2 (L) o/w WNL (Creatinine: 0.69)      Troponin (Collected 0552): <0.015  Troponin (Collected 1104): <0.015    Lactic acid (Resulted 0740): 0.5 (L)  BNP: 462    Procalcitonin: <0.05    Emergency Department Course:  Reviewed:  I reviewed the patient's nursing notes, vitals, past medical records, Care Everywhere.     Assessments:  0614 I performed an " "assessment and examination of the patient as noted above.    0957 I updated the patient with her cousin at the bedside.       6910 I updated the patient on her normal repeat Troponin result. Patient discharged home with instructions regarding supportive care, medications, and reasons to return.     Disposition:  The patient was discharged to home.       Impression & Plan     Medical Decision Making:  This patient is a pleasant 85-year-old female who was on 2 L of oxygen at night as well as as needed for COPD who presents today with \"raspy breathing\" and a little bit increased cough.  She denies any significant shortness of breath, orthopnea, pleuritic pain, other chest pain.  Her physical exam is relatively unremarkable for any localizing abnormality.  She is satting well, is not typically tachycardic, has no significant blood blood pressure abnormalities.  I considered pneumonia as an etiology but  With a relatively normal white count, procalcitonin is unremarkable, negative lactic acid, normal chest x-ray I think this is unlikely.  I considered PE but with her complete lack of shortness of breath, chest pain, vital sign abnormalities suspect this is unlikely.  I considered ACS however troponin, delta values, were negative as was her EKG.  Similarly she does not show any evidence of volume overload has no edema on exam, has no pleural effusion or pulmonary edema on x-ray.  She does have a little bit low sodium however looking at her chart her baseline sodium is typically around 130 so she is not markedly up from her baseline.  Overall I suspect this is a mild COPD exacerbation from a viral etiology.  She feels relieved by this and does not feel that she needs further work-up or management.  I think at this point in time its stable and appropriate for her to be discharged home.    Covid-19  Aisha Fitzgerald was evaluated during a global COVID-19 pandemic, which necessitated consideration that the patient might be at " risk for infection with the SARS-CoV-2 virus that causes COVID-19.   Applicable protocols for evaluation were followed during the patient's care.     Diagnosis:    ICD-10-CM    1. Cough  R05        Scribe Disclosure:  I, Amy Gene, am serving as a scribe at 6:14 AM on 8/15/2021 to document services personally performed by Renay Hannah MD based on my observations and the provider's statements to me.        Renay Hannah MD  08/15/21 1249

## 2021-08-22 ENCOUNTER — LAB REQUISITION (OUTPATIENT)
Dept: LAB | Facility: CLINIC | Age: 86
End: 2021-08-22
Payer: COMMERCIAL

## 2021-08-22 DIAGNOSIS — Z20.822 CONTACT WITH AND (SUSPECTED) EXPOSURE TO COVID-19: ICD-10-CM

## 2021-08-22 PROCEDURE — U0003 INFECTIOUS AGENT DETECTION BY NUCLEIC ACID (DNA OR RNA); SEVERE ACUTE RESPIRATORY SYNDROME CORONAVIRUS 2 (SARS-COV-2) (CORONAVIRUS DISEASE [COVID-19]), AMPLIFIED PROBE TECHNIQUE, MAKING USE OF HIGH THROUGHPUT TECHNOLOGIES AS DESCRIBED BY CMS-2020-01-R: HCPCS | Performed by: INTERNAL MEDICINE

## 2021-08-23 LAB — SARS-COV-2 RNA RESP QL NAA+PROBE: NEGATIVE

## 2021-08-24 ENCOUNTER — LAB REQUISITION (OUTPATIENT)
Dept: LAB | Facility: CLINIC | Age: 86
End: 2021-08-24
Payer: COMMERCIAL

## 2021-08-24 DIAGNOSIS — E87.1 HYPO-OSMOLALITY AND HYPONATREMIA: ICD-10-CM

## 2021-08-24 DIAGNOSIS — I10 ESSENTIAL (PRIMARY) HYPERTENSION: ICD-10-CM

## 2021-08-25 LAB
ANION GAP SERPL CALCULATED.3IONS-SCNC: 8 MMOL/L (ref 5–18)
BUN SERPL-MCNC: 18 MG/DL (ref 8–28)
CALCIUM SERPL-MCNC: 9 MG/DL (ref 8.5–10.5)
CHLORIDE BLD-SCNC: 96 MMOL/L (ref 98–107)
CO2 SERPL-SCNC: 25 MMOL/L (ref 22–31)
CREAT SERPL-MCNC: 0.79 MG/DL (ref 0.6–1.1)
ERYTHROCYTE [DISTWIDTH] IN BLOOD BY AUTOMATED COUNT: 14.5 % (ref 10–15)
GFR SERPL CREATININE-BSD FRML MDRD: 69 ML/MIN/1.73M2
GLUCOSE BLD-MCNC: 112 MG/DL (ref 70–125)
HCT VFR BLD AUTO: 29 % (ref 35–47)
HGB BLD-MCNC: 9.5 G/DL (ref 11.7–15.7)
MCH RBC QN AUTO: 28.5 PG (ref 26.5–33)
MCHC RBC AUTO-ENTMCNC: 32.8 G/DL (ref 31.5–36.5)
MCV RBC AUTO: 87 FL (ref 78–100)
PLATELET # BLD AUTO: 254 10E3/UL (ref 150–450)
POTASSIUM BLD-SCNC: 4.2 MMOL/L (ref 3.5–5)
RBC # BLD AUTO: 3.33 10E6/UL (ref 3.8–5.2)
SODIUM SERPL-SCNC: 129 MMOL/L (ref 136–145)
WBC # BLD AUTO: 7.8 10E3/UL (ref 4–11)

## 2021-08-25 PROCEDURE — P9604 ONE-WAY ALLOW PRORATED TRIP: HCPCS | Performed by: NURSE PRACTITIONER

## 2021-08-25 PROCEDURE — 85027 COMPLETE CBC AUTOMATED: CPT | Performed by: NURSE PRACTITIONER

## 2021-08-25 PROCEDURE — 36415 COLL VENOUS BLD VENIPUNCTURE: CPT | Performed by: NURSE PRACTITIONER

## 2021-08-25 PROCEDURE — 80048 BASIC METABOLIC PNL TOTAL CA: CPT | Performed by: NURSE PRACTITIONER

## 2021-08-28 ENCOUNTER — LAB REQUISITION (OUTPATIENT)
Dept: LAB | Facility: CLINIC | Age: 86
End: 2021-08-28
Payer: COMMERCIAL

## 2021-08-28 ENCOUNTER — LAB REQUISITION (OUTPATIENT)
Dept: LAB | Facility: CLINIC | Age: 86
End: 2021-08-28

## 2021-08-28 DIAGNOSIS — E87.1 HYPO-OSMOLALITY AND HYPONATREMIA: ICD-10-CM

## 2021-08-28 DIAGNOSIS — Z20.822 CONTACT WITH AND (SUSPECTED) EXPOSURE TO COVID-19: ICD-10-CM

## 2021-08-28 PROCEDURE — U0005 INFEC AGEN DETEC AMPLI PROBE: HCPCS | Performed by: INTERNAL MEDICINE

## 2021-08-29 ENCOUNTER — LAB REQUISITION (OUTPATIENT)
Dept: LAB | Facility: CLINIC | Age: 86
End: 2021-08-29
Payer: COMMERCIAL

## 2021-08-29 DIAGNOSIS — J44.0 CHRONIC OBSTRUCTIVE PULMONARY DISEASE WITH (ACUTE) LOWER RESPIRATORY INFECTION (H): ICD-10-CM

## 2021-08-29 LAB — SARS-COV-2 RNA RESP QL NAA+PROBE: NEGATIVE

## 2021-08-30 LAB
BASOPHILS # BLD AUTO: 0 10E3/UL (ref 0–0.2)
BASOPHILS NFR BLD AUTO: 1 %
EOSINOPHIL # BLD AUTO: 0.1 10E3/UL (ref 0–0.7)
EOSINOPHIL NFR BLD AUTO: 2 %
ERYTHROCYTE [DISTWIDTH] IN BLOOD BY AUTOMATED COUNT: 15.2 % (ref 10–15)
HCT VFR BLD AUTO: 28.8 % (ref 35–47)
HGB BLD-MCNC: 9.3 G/DL (ref 11.7–15.7)
IMM GRANULOCYTES # BLD: 0 10E3/UL
IMM GRANULOCYTES NFR BLD: 1 %
LYMPHOCYTES # BLD AUTO: 1.1 10E3/UL (ref 0.8–5.3)
LYMPHOCYTES NFR BLD AUTO: 25 %
MCH RBC QN AUTO: 28.6 PG (ref 26.5–33)
MCHC RBC AUTO-ENTMCNC: 32.3 G/DL (ref 31.5–36.5)
MCV RBC AUTO: 89 FL (ref 78–100)
MONOCYTES # BLD AUTO: 0.5 10E3/UL (ref 0–1.3)
MONOCYTES NFR BLD AUTO: 12 %
NEUTROPHILS # BLD AUTO: 2.6 10E3/UL (ref 1.6–8.3)
NEUTROPHILS NFR BLD AUTO: 59 %
NRBC # BLD AUTO: 0 10E3/UL
NRBC BLD AUTO-RTO: 0 /100
PLATELET # BLD AUTO: 223 10E3/UL (ref 150–450)
RBC # BLD AUTO: 3.25 10E6/UL (ref 3.8–5.2)
SODIUM SERPL-SCNC: 131 MMOL/L (ref 136–145)
WBC # BLD AUTO: 4.4 10E3/UL (ref 4–11)

## 2021-08-30 PROCEDURE — 36415 COLL VENOUS BLD VENIPUNCTURE: CPT | Performed by: INTERNAL MEDICINE

## 2021-08-30 PROCEDURE — 85004 AUTOMATED DIFF WBC COUNT: CPT | Performed by: INTERNAL MEDICINE

## 2021-08-30 PROCEDURE — P9604 ONE-WAY ALLOW PRORATED TRIP: HCPCS | Performed by: INTERNAL MEDICINE

## 2021-08-30 PROCEDURE — P9604 ONE-WAY ALLOW PRORATED TRIP: HCPCS | Performed by: NURSE PRACTITIONER

## 2021-08-30 PROCEDURE — 84295 ASSAY OF SERUM SODIUM: CPT | Performed by: NURSE PRACTITIONER

## 2021-08-31 ENCOUNTER — LAB REQUISITION (OUTPATIENT)
Dept: LAB | Facility: CLINIC | Age: 86
End: 2021-08-31
Payer: COMMERCIAL

## 2021-09-01 ENCOUNTER — LAB REQUISITION (OUTPATIENT)
Dept: LAB | Facility: CLINIC | Age: 86
End: 2021-09-01
Payer: COMMERCIAL

## 2021-09-01 DIAGNOSIS — Z99.81 DEPENDENCE ON SUPPLEMENTAL OXYGEN: ICD-10-CM

## 2021-09-01 DIAGNOSIS — Z79.4 LONG TERM (CURRENT) USE OF INSULIN (H): ICD-10-CM

## 2021-09-01 DIAGNOSIS — I10 ESSENTIAL (PRIMARY) HYPERTENSION: ICD-10-CM

## 2021-09-01 DIAGNOSIS — Z91.81 HISTORY OF FALLING: ICD-10-CM

## 2021-09-01 DIAGNOSIS — E03.9 HYPOTHYROIDISM, UNSPECIFIED: ICD-10-CM

## 2021-09-01 DIAGNOSIS — E11.9 TYPE 2 DIABETES MELLITUS WITHOUT COMPLICATIONS (H): ICD-10-CM

## 2021-09-01 DIAGNOSIS — G47.00 INSOMNIA, UNSPECIFIED: ICD-10-CM

## 2021-09-01 LAB
ALBUMIN UR-MCNC: 30 MG/DL
APPEARANCE UR: CLEAR
BACTERIA #/AREA URNS HPF: ABNORMAL /HPF
BILIRUB UR QL STRIP: NEGATIVE
COLOR UR AUTO: YELLOW
GLUCOSE UR STRIP-MCNC: 200 MG/DL
HGB UR QL STRIP: NEGATIVE
KETONES UR STRIP-MCNC: NEGATIVE MG/DL
LEUKOCYTE ESTERASE UR QL STRIP: NEGATIVE
MUCOUS THREADS #/AREA URNS LPF: PRESENT /LPF
NITRATE UR QL: NEGATIVE
PH UR STRIP: 6 [PH] (ref 5–7)
RBC URINE: 1 /HPF
SP GR UR STRIP: 1.02 (ref 1–1.03)
SQUAMOUS EPITHELIAL: 2 /HPF
UROBILINOGEN UR STRIP-MCNC: <2 MG/DL
WBC URINE: 3 /HPF

## 2021-09-01 PROCEDURE — 81001 URINALYSIS AUTO W/SCOPE: CPT | Performed by: INTERNAL MEDICINE

## 2021-09-01 PROCEDURE — 87086 URINE CULTURE/COLONY COUNT: CPT | Mod: ORL | Performed by: INTERNAL MEDICINE

## 2021-09-02 LAB — BACTERIA UR CULT: NO GROWTH

## 2021-09-04 ENCOUNTER — HEALTH MAINTENANCE LETTER (OUTPATIENT)
Age: 86
End: 2021-09-04

## 2021-09-07 LAB
HGB BLD-MCNC: 9.2 G/DL (ref 11.7–15.7)
SODIUM SERPL-SCNC: 133 MMOL/L (ref 136–145)

## 2021-09-07 PROCEDURE — P9604 ONE-WAY ALLOW PRORATED TRIP: HCPCS | Performed by: INTERNAL MEDICINE

## 2021-09-07 PROCEDURE — 85018 HEMOGLOBIN: CPT | Mod: ORL | Performed by: INTERNAL MEDICINE

## 2021-09-07 PROCEDURE — 84295 ASSAY OF SERUM SODIUM: CPT | Mod: ORL | Performed by: INTERNAL MEDICINE

## 2021-09-07 PROCEDURE — 36415 COLL VENOUS BLD VENIPUNCTURE: CPT | Performed by: INTERNAL MEDICINE

## 2021-09-15 ENCOUNTER — LAB REQUISITION (OUTPATIENT)
Dept: LAB | Facility: CLINIC | Age: 86
End: 2021-09-15
Payer: COMMERCIAL

## 2021-09-15 DIAGNOSIS — D64.9 ANEMIA, UNSPECIFIED: ICD-10-CM

## 2021-09-16 LAB
HGB BLD-MCNC: 10.5 G/DL (ref 11.7–15.7)
IRON SATN MFR SERPL: 13 % (ref 20–50)
IRON SERPL-MCNC: 43 UG/DL (ref 42–175)
TIBC SERPL-MCNC: 334 UG/DL (ref 313–563)
TRANSFERRIN SERPL-MCNC: 267 MG/DL (ref 212–360)

## 2021-09-16 PROCEDURE — 36415 COLL VENOUS BLD VENIPUNCTURE: CPT | Mod: ORL | Performed by: NURSE PRACTITIONER

## 2021-09-16 PROCEDURE — 85018 HEMOGLOBIN: CPT | Mod: ORL | Performed by: NURSE PRACTITIONER

## 2021-09-16 PROCEDURE — 83540 ASSAY OF IRON: CPT | Mod: ORL | Performed by: NURSE PRACTITIONER

## 2021-09-16 PROCEDURE — P9604 ONE-WAY ALLOW PRORATED TRIP: HCPCS | Performed by: NURSE PRACTITIONER

## 2021-10-11 ENCOUNTER — LAB REQUISITION (OUTPATIENT)
Dept: LAB | Facility: CLINIC | Age: 86
End: 2021-10-11
Payer: COMMERCIAL

## 2021-10-11 DIAGNOSIS — E87.1 HYPO-OSMOLALITY AND HYPONATREMIA: ICD-10-CM

## 2021-10-11 DIAGNOSIS — I10 ESSENTIAL (PRIMARY) HYPERTENSION: ICD-10-CM

## 2021-10-12 LAB
ANION GAP SERPL CALCULATED.3IONS-SCNC: 6 MMOL/L (ref 5–18)
BUN SERPL-MCNC: 12 MG/DL (ref 8–28)
CALCIUM SERPL-MCNC: 9.4 MG/DL (ref 8.5–10.5)
CHLORIDE BLD-SCNC: 101 MMOL/L (ref 98–107)
CO2 SERPL-SCNC: 28 MMOL/L (ref 22–31)
CREAT SERPL-MCNC: 0.7 MG/DL (ref 0.6–1.1)
GFR SERPL CREATININE-BSD FRML MDRD: 80 ML/MIN/1.73M2
GLUCOSE BLD-MCNC: 120 MG/DL (ref 70–125)
POTASSIUM BLD-SCNC: 4.3 MMOL/L (ref 3.5–5)
SODIUM SERPL-SCNC: 135 MMOL/L (ref 136–145)

## 2021-10-12 PROCEDURE — 80048 BASIC METABOLIC PNL TOTAL CA: CPT | Mod: ORL | Performed by: NURSE PRACTITIONER

## 2021-10-12 PROCEDURE — P9604 ONE-WAY ALLOW PRORATED TRIP: HCPCS | Mod: ORL | Performed by: NURSE PRACTITIONER

## 2021-10-12 PROCEDURE — 36415 COLL VENOUS BLD VENIPUNCTURE: CPT | Mod: ORL | Performed by: NURSE PRACTITIONER

## 2021-10-30 ENCOUNTER — HEALTH MAINTENANCE LETTER (OUTPATIENT)
Age: 86
End: 2021-10-30

## 2022-01-01 ENCOUNTER — HEALTH MAINTENANCE LETTER (OUTPATIENT)
Age: 87
End: 2022-01-01